# Patient Record
Sex: FEMALE | Race: BLACK OR AFRICAN AMERICAN | NOT HISPANIC OR LATINO | Employment: UNEMPLOYED | URBAN - METROPOLITAN AREA
[De-identification: names, ages, dates, MRNs, and addresses within clinical notes are randomized per-mention and may not be internally consistent; named-entity substitution may affect disease eponyms.]

---

## 2020-12-08 ENCOUNTER — ULTRASOUND (OUTPATIENT)
Dept: OBGYN CLINIC | Facility: CLINIC | Age: 25
End: 2020-12-08

## 2020-12-08 VITALS
DIASTOLIC BLOOD PRESSURE: 71 MMHG | HEART RATE: 84 BPM | BODY MASS INDEX: 18.92 KG/M2 | WEIGHT: 110.8 LBS | SYSTOLIC BLOOD PRESSURE: 107 MMHG | HEIGHT: 64 IN

## 2020-12-08 DIAGNOSIS — Z3A.15 15 WEEKS GESTATION OF PREGNANCY: ICD-10-CM

## 2020-12-08 DIAGNOSIS — N91.2 AMENORRHEA: Primary | ICD-10-CM

## 2020-12-08 LAB — SL AMB POCT URINE HCG: POSITIVE

## 2020-12-08 PROCEDURE — 81025 URINE PREGNANCY TEST: CPT | Performed by: OBSTETRICS & GYNECOLOGY

## 2020-12-08 PROCEDURE — 99203 OFFICE O/P NEW LOW 30 MIN: CPT | Performed by: OBSTETRICS & GYNECOLOGY

## 2020-12-14 ENCOUNTER — INITIAL PRENATAL (OUTPATIENT)
Dept: OBGYN CLINIC | Facility: CLINIC | Age: 25
End: 2020-12-14

## 2020-12-14 VITALS
DIASTOLIC BLOOD PRESSURE: 75 MMHG | HEIGHT: 64 IN | WEIGHT: 112.4 LBS | HEART RATE: 88 BPM | SYSTOLIC BLOOD PRESSURE: 109 MMHG | BODY MASS INDEX: 19.19 KG/M2

## 2020-12-14 DIAGNOSIS — Z3A.16 16 WEEKS GESTATION OF PREGNANCY: Primary | ICD-10-CM

## 2020-12-14 PROCEDURE — T1001 NURSING ASSESSMENT/EVALUATN: HCPCS

## 2020-12-15 ENCOUNTER — LAB (OUTPATIENT)
Dept: LAB | Facility: HOSPITAL | Age: 25
End: 2020-12-15
Payer: COMMERCIAL

## 2020-12-15 DIAGNOSIS — Z3A.16 16 WEEKS GESTATION OF PREGNANCY: ICD-10-CM

## 2020-12-15 DIAGNOSIS — Z3A.15 15 WEEKS GESTATION OF PREGNANCY: ICD-10-CM

## 2020-12-15 LAB
ABO GROUP BLD: NORMAL
BACTERIA UR QL AUTO: ABNORMAL /HPF
BASOPHILS # BLD AUTO: 0.02 THOUSANDS/ΜL (ref 0–0.1)
BASOPHILS NFR BLD AUTO: 0 % (ref 0–1)
BILIRUB UR QL STRIP: NEGATIVE
BLD GP AB SCN SERPL QL: NEGATIVE
CLARITY UR: CLEAR
COLOR UR: ABNORMAL
EOSINOPHIL # BLD AUTO: 0.18 THOUSAND/ΜL (ref 0–0.61)
EOSINOPHIL NFR BLD AUTO: 3 % (ref 0–6)
ERYTHROCYTE [DISTWIDTH] IN BLOOD BY AUTOMATED COUNT: 14 % (ref 11.6–15.1)
GLUCOSE UR STRIP-MCNC: NEGATIVE MG/DL
HBV SURFACE AG SER QL: NORMAL
HCT VFR BLD AUTO: 33.2 % (ref 34.8–46.1)
HGB BLD-MCNC: 11.1 G/DL (ref 11.5–15.4)
HGB UR QL STRIP.AUTO: NEGATIVE
HYALINE CASTS #/AREA URNS LPF: ABNORMAL /LPF
IMM GRANULOCYTES # BLD AUTO: 0.02 THOUSAND/UL (ref 0–0.2)
IMM GRANULOCYTES NFR BLD AUTO: 0 % (ref 0–2)
KETONES UR STRIP-MCNC: ABNORMAL MG/DL
LEUKOCYTE ESTERASE UR QL STRIP: ABNORMAL
LYMPHOCYTES # BLD AUTO: 1.54 THOUSANDS/ΜL (ref 0.6–4.47)
LYMPHOCYTES NFR BLD AUTO: 24 % (ref 14–44)
MCH RBC QN AUTO: 26.1 PG (ref 26.8–34.3)
MCHC RBC AUTO-ENTMCNC: 33.4 G/DL (ref 31.4–37.4)
MCV RBC AUTO: 78 FL (ref 82–98)
MONOCYTES # BLD AUTO: 0.47 THOUSAND/ΜL (ref 0.17–1.22)
MONOCYTES NFR BLD AUTO: 7 % (ref 4–12)
NEUTROPHILS # BLD AUTO: 4.08 THOUSANDS/ΜL (ref 1.85–7.62)
NEUTS SEG NFR BLD AUTO: 66 % (ref 43–75)
NITRITE UR QL STRIP: NEGATIVE
NON-SQ EPI CELLS URNS QL MICRO: ABNORMAL /HPF
NRBC BLD AUTO-RTO: 0 /100 WBCS
PH UR STRIP.AUTO: 5.5 [PH]
PLATELET # BLD AUTO: 197 THOUSANDS/UL (ref 149–390)
PMV BLD AUTO: 11.2 FL (ref 8.9–12.7)
PROT UR STRIP-MCNC: NEGATIVE MG/DL
RBC # BLD AUTO: 4.26 MILLION/UL (ref 3.81–5.12)
RBC #/AREA URNS AUTO: ABNORMAL /HPF
RH BLD: POSITIVE
RUBV IGG SERPL IA-ACNC: >175 IU/ML
SP GR UR STRIP.AUTO: 1.02 (ref 1–1.03)
SPECIMEN EXPIRATION DATE: NORMAL
UROBILINOGEN UR QL STRIP.AUTO: 1 E.U./DL
WBC # BLD AUTO: 6.31 THOUSAND/UL (ref 4.31–10.16)
WBC #/AREA URNS AUTO: ABNORMAL /HPF

## 2020-12-15 PROCEDURE — 87086 URINE CULTURE/COLONY COUNT: CPT

## 2020-12-15 PROCEDURE — 81001 URINALYSIS AUTO W/SCOPE: CPT

## 2020-12-15 PROCEDURE — 82105 ALPHA-FETOPROTEIN SERUM: CPT

## 2020-12-15 PROCEDURE — 83020 HEMOGLOBIN ELECTROPHORESIS: CPT

## 2020-12-15 PROCEDURE — 80081 OBSTETRIC PANEL INC HIV TSTG: CPT

## 2020-12-15 PROCEDURE — 36415 COLL VENOUS BLD VENIPUNCTURE: CPT

## 2020-12-15 PROCEDURE — 86787 VARICELLA-ZOSTER ANTIBODY: CPT

## 2020-12-16 LAB
BACTERIA UR CULT: NORMAL
HIV 1+2 AB+HIV1 P24 AG SERPL QL IA: NORMAL
RPR SER QL: NORMAL

## 2020-12-17 LAB
2ND TRIMESTER 4 SCREEN SERPL-IMP: NORMAL
AFP ADJ MOM SERPL: 0.71
AFP INTERP AMN-IMP: NORMAL
AFP INTERP SERPL-IMP: NORMAL
AFP INTERP SERPL-IMP: NORMAL
AFP SERPL-MCNC: 32.8 NG/ML
AGE AT DELIVERY: 27.5 YR
GA METHOD: NORMAL
GA: 17 WEEKS
IDDM PATIENT QL: NO
MULTIPLE PREGNANCY: NO
NEURAL TUBE DEFECT RISK FETUS: NORMAL %

## 2020-12-18 LAB
DEPRECATED HGB OTHER BLD-IMP: 0 %
HGB A MFR BLD: 4.3 % (ref 1.8–3.2)
HGB A MFR BLD: 62.3 % (ref 96.4–98.8)
HGB C MFR BLD: 0 %
HGB F MFR BLD: 0 % (ref 0–2)
HGB FRACT BLD-IMP: ABNORMAL
HGB S BLD QL SOLY: POSITIVE
HGB S MFR BLD: 33.4 %
VZV IGG SER IA-ACNC: NORMAL

## 2020-12-21 ENCOUNTER — TELEPHONE (OUTPATIENT)
Dept: OBGYN CLINIC | Facility: CLINIC | Age: 25
End: 2020-12-21

## 2020-12-22 ENCOUNTER — ROUTINE PRENATAL (OUTPATIENT)
Dept: OBGYN CLINIC | Facility: CLINIC | Age: 25
End: 2020-12-22

## 2020-12-22 VITALS
HEIGHT: 64 IN | DIASTOLIC BLOOD PRESSURE: 73 MMHG | BODY MASS INDEX: 19.46 KG/M2 | WEIGHT: 114 LBS | HEART RATE: 94 BPM | SYSTOLIC BLOOD PRESSURE: 106 MMHG

## 2020-12-22 DIAGNOSIS — Z3A.16 16 WEEKS GESTATION OF PREGNANCY: Primary | ICD-10-CM

## 2020-12-22 DIAGNOSIS — Z12.4 PAP SMEAR FOR CERVICAL CANCER SCREENING: ICD-10-CM

## 2020-12-22 DIAGNOSIS — Z11.3 SCREEN FOR STD (SEXUALLY TRANSMITTED DISEASE): ICD-10-CM

## 2020-12-22 PROBLEM — D57.3 SICKLE CELL TRAIT (HCC): Status: ACTIVE | Noted: 2020-12-22

## 2020-12-22 LAB
SL AMB  POCT GLUCOSE, UA: NEGATIVE
SL AMB POCT URINE PROTEIN: NEGATIVE

## 2020-12-22 PROCEDURE — 99213 OFFICE O/P EST LOW 20 MIN: CPT | Performed by: OBSTETRICS & GYNECOLOGY

## 2020-12-22 PROCEDURE — 81002 URINALYSIS NONAUTO W/O SCOPE: CPT | Performed by: OBSTETRICS & GYNECOLOGY

## 2020-12-22 PROCEDURE — G0145 SCR C/V CYTO,THINLAYER,RESCR: HCPCS | Performed by: OBSTETRICS & GYNECOLOGY

## 2020-12-22 PROCEDURE — 87591 N.GONORRHOEAE DNA AMP PROB: CPT | Performed by: OBSTETRICS & GYNECOLOGY

## 2020-12-22 PROCEDURE — 87491 CHLMYD TRACH DNA AMP PROBE: CPT | Performed by: OBSTETRICS & GYNECOLOGY

## 2020-12-23 LAB
C TRACH DNA SPEC QL NAA+PROBE: NEGATIVE
N GONORRHOEA DNA SPEC QL NAA+PROBE: NEGATIVE

## 2020-12-28 LAB
LAB AP GYN PRIMARY INTERPRETATION: NORMAL
Lab: NORMAL

## 2021-01-19 ENCOUNTER — ROUTINE PRENATAL (OUTPATIENT)
Dept: OBGYN CLINIC | Facility: CLINIC | Age: 26
End: 2021-01-19

## 2021-01-19 VITALS
HEIGHT: 64 IN | DIASTOLIC BLOOD PRESSURE: 57 MMHG | SYSTOLIC BLOOD PRESSURE: 91 MMHG | BODY MASS INDEX: 19.63 KG/M2 | WEIGHT: 115 LBS | HEART RATE: 82 BPM

## 2021-01-19 DIAGNOSIS — Z3A.20 20 WEEKS GESTATION OF PREGNANCY: Primary | ICD-10-CM

## 2021-01-19 LAB
SL AMB  POCT GLUCOSE, UA: NORMAL
SL AMB LEUKOCYTE ESTERASE,UA: NORMAL
SL AMB POCT BILIRUBIN,UA: NORMAL
SL AMB POCT BLOOD,UA: NORMAL
SL AMB POCT KETONES,UA: NORMAL
SL AMB POCT NITRITE,UA: NORMAL
SL AMB POCT PH,UA: 6
SL AMB POCT SPECIFIC GRAVITY,UA: 1.01
SL AMB POCT URINE PROTEIN: NORMAL
SL AMB POCT UROBILINOGEN: NORMAL

## 2021-01-19 PROCEDURE — 81002 URINALYSIS NONAUTO W/O SCOPE: CPT | Performed by: OBSTETRICS & GYNECOLOGY

## 2021-01-19 PROCEDURE — 99213 OFFICE O/P EST LOW 20 MIN: CPT | Performed by: OBSTETRICS & GYNECOLOGY

## 2021-01-19 NOTE — ASSESSMENT & PLAN NOTE
Blood type A+, Sickle cell trait   Needs both flu and Tdap; declined  Partner did not complete Hgb electrophoresis, did explain the utility of it, however he reports that he does not have any medical insurance  YESSICA zimmerman  Upcoming  01/26/21  Order 28 week labs at next visit  Contraception: declines contraception   Labor precautions reviewed  Fetal kick counts reviewed  Continue Prenatal Vitamins  RTC 4 weeks

## 2021-01-19 NOTE — PROGRESS NOTES
OB/GYN prenatal visit    S: 30 y o  Linwood Leon 20w1d here for PN visit  She has no obstetric complaints, including pelvic pain, contractions, vaginal bleeding, loss of fluid, or decreased fetal movement, reports she recently started feeling movement       OB Cx: none    O:  Vitals:    01/19/21 1120   BP: 91/57   Pulse: 82       Gen: no acute distress, nonlabored breathing          A/P:  Problem List Items Addressed This Visit        Other    20 weeks gestation of pregnancy - Primary     Blood type A+, Sickle cell trait   Needs both flu and Tdap; declined  Partner did not complete Hgb electrophoresis, did explain the utility of it, however he reports that he does not have any medical insurance  MSAFP wnl  Upcoming US 01/26/21  Order 28 week labs at next visit  Contraception: declines contraception   Labor precautions reviewed  Fetal kick counts reviewed  Continue Prenatal Vitamins  RTC 4 weeks         Relevant Orders    POCT urine dip (Completed)                       Sandee De Santiago MD  1/19/2021  11:30 AM

## 2021-01-25 ENCOUNTER — TELEPHONE (OUTPATIENT)
Dept: PERINATAL CARE | Facility: CLINIC | Age: 26
End: 2021-01-25

## 2021-01-25 NOTE — TELEPHONE ENCOUNTER
Spoke with patient and confirmed appointment with [facility]  1 support person ( must be over age of 15) may accompany patient  Will you and your support person be able to wear a mask ,without a valve , during entire appointment? YES   To minimize your exposure in our waiting area,check in and rooming questions will be done via phone  When you arrive in the parking lot please call the following inside line # prior to entering office:    Alf: [phone]    Have you or your support person traveled outside the state in the last 2 weeks? NO  If yes, what state did you travel to? Do you or your support person have:  Fever or flu- like symptoms? NO  Symptoms of upper respiratory infection like runny nose, sore throat or cough? NO  Do you have new headache that you have not had in the past?NO  Have you experienced any new shortness of breath recently? NO  Do you have any new loss of taste or smell? NO  Do you have any new diarrhea, nausea or vomiting? NO  Have you recently been in contact with anyone who has been sick or diagnosed with COVID-19 infection? NO  Have you been recommended to quarantine because of an exposure to a confirmed positive COVID19 person? NO  Have you recently been tested for COVID19? NO    Patient verbalized understanding of all instructions   -------------------------------------------------------------

## 2021-01-26 ENCOUNTER — ROUTINE PRENATAL (OUTPATIENT)
Dept: PERINATAL CARE | Facility: CLINIC | Age: 26
End: 2021-01-26
Payer: COMMERCIAL

## 2021-01-26 VITALS
HEIGHT: 64 IN | HEART RATE: 84 BPM | DIASTOLIC BLOOD PRESSURE: 65 MMHG | BODY MASS INDEX: 20.35 KG/M2 | WEIGHT: 119.2 LBS | SYSTOLIC BLOOD PRESSURE: 99 MMHG

## 2021-01-26 DIAGNOSIS — N91.2 AMENORRHEA: ICD-10-CM

## 2021-01-26 DIAGNOSIS — Z3A.21 21 WEEKS GESTATION OF PREGNANCY: Primary | ICD-10-CM

## 2021-01-26 DIAGNOSIS — Z13.79 GENETIC SCREENING: ICD-10-CM

## 2021-01-26 PROCEDURE — 76817 TRANSVAGINAL US OBSTETRIC: CPT | Performed by: OBSTETRICS & GYNECOLOGY

## 2021-01-26 PROCEDURE — 76811 OB US DETAILED SNGL FETUS: CPT | Performed by: OBSTETRICS & GYNECOLOGY

## 2021-01-26 PROCEDURE — 99202 OFFICE O/P NEW SF 15 MIN: CPT | Performed by: OBSTETRICS & GYNECOLOGY

## 2021-01-26 NOTE — LETTER
2021     Echo Neff MD  [address] [place] [address]    Patient: Martina Dumont   YOB: 1995   Date of Visit: 2021       Dear Dr Sharyle Schlatter: Thank you for referring Martina Dumont to me for evaluation  Below are my notes for this consultation  If you have questions, please do not hesitate to call me  I look forward to following your patient along with you  Sincerely,        Pau Romero MD        CC: No Recipients  Pau Romero MD  2021 12:43 PM  Sign when Signing Visit  Ob ultrasound and [facility] consultation    Ms Geeta Lawson is a 29 yo   patient  1st visit in [facility]  Communication was very difficult even with support from a [facility]  over the telephone and assistance from her partner [employer] ultrasound at 21 1/7 weeks gestation who presents for a fetal anatomical examination and TV cervical length  She is asymptomatic  See Ob procedures in EPIC  Ultrasound:      1  Fetal size = dates  2   No fetal anomalies observed  3  Normal; TV cervical length=5 0  cm; No funneling, no debris at the internal os  I reviewed the results of this ultrasound and answered  all the questions  Ob Hx: 2018:  vaginal delivery at term  Live baby boy  Does not recall weight  Medical Hx: Negative      Surgical Hx: Appendectomy in 2018      Social Hx: no use of tobacco alcohol or illicit drugs      Medications: None      Family Hx: non contributory      Allergies: None      Recommendations:      1  At this gestational age a test to screen for Open neural tube defects (ONTD's) by the measurement of the MSAFP, can be offered  knowing the ultrasound done today  is within normal range and has a high sensitivity to detect ONTD's in the 95%  The Quad screen can be used for fetal aneuploidy with an 81% detection rate for Trisomy 21, to be done before 22 weeks to be ordered and followed from your office        2  Follow up scan at 34 weeks to monitor fetal growtth and development      The total time spent on this established patient on the encounter date was 20 minutes  This time included previsit service time of 5 minutes, face-to-face  service time of 10 minutes discussing the results of the ultrasound, medical history, findings and recomendations, and post-service time of 5 minutes  Thank you for referring your patient to our offices  The limitations of ultrasound to detect all anomalies was reviewed and how it is not  a test to rule out aneuploidy  If you have any further questions do not hesitate to contact us as [phone]       Kandace Solares MD

## 2021-01-26 NOTE — PROGRESS NOTES
Ultrasound Probe Disinfection    A transvaginal ultrasound was performed  Prior to use, disinfection was performed with High Level Disinfection Process (Trophon)  Probe serial number [id number]: [id number] was used        [facility]  01/26/21  11:06 AM

## 2021-01-29 PROBLEM — Z3A.21 21 WEEKS GESTATION OF PREGNANCY: Status: ACTIVE | Noted: 2021-01-19

## 2021-01-29 PROBLEM — Z13.79 GENETIC SCREENING: Status: ACTIVE | Noted: 2021-01-29

## 2021-01-29 PROBLEM — N91.2 AMENORRHEA: Status: ACTIVE | Noted: 2021-01-29

## 2021-01-29 NOTE — PROGRESS NOTES
Ob ultrasound and [facility] consultation    Ms Wiliam Garcia is a 31 yo   patient  1st visit in [facility]  Communication was very difficult even with support from a Richard Kinney  over the telephone and assistance from her partner Dony Arash ultrasound at 21 1/7 weeks gestation who presents for a fetal anatomical examination and TV cervical length  She is asymptomatic  See Ob procedures in EPIC  Ultrasound:      1  Fetal size = dates  2   No fetal anomalies observed  3  Normal; TV cervical length=5 0  cm; No funneling, no debris at the internal os  I reviewed the results of this ultrasound and answered  all the questions  Ob Hx: 2018:  vaginal delivery at term  Live baby boy  Does not recall weight  Medical Hx: Negative      Surgical Hx: Appendectomy in 2018      Social Hx: no use of tobacco alcohol or illicit drugs      Medications: None      Family Hx: non contributory      Allergies: None      Recommendations:      1  At this gestational age a test to screen for Open neural tube defects (ONTD's) by the measurement of the MSAFP, can be offered  knowing the ultrasound done today  is within normal range and has a high sensitivity to detect ONTD's in the 95%  The Quad screen can be used for fetal aneuploidy with an 81% detection rate for Trisomy 21, to be done before 22 weeks to be ordered and followed from your office  2  Follow up scan at 34 weeks to monitor fetal growtth and development      The total time spent on this established patient on the encounter date was 20 minutes  This time included previsit service time of 5 minutes, face-to-face  service time of 10 minutes discussing the results of the ultrasound, medical history, findings and recomendations, and post-service time of 5 minutes  Thank you for referring your patient to our offices  The limitations of ultrasound to detect all anomalies was reviewed and how it is not  a test to rule out aneuploidy   If you have any further questions do not hesitate to contact us as [phone]       Sorin Marin MD

## 2021-02-15 ENCOUNTER — TELEPHONE (OUTPATIENT)
Dept: OBGYN CLINIC | Facility: CLINIC | Age: 26
End: 2021-02-15

## 2021-02-16 ENCOUNTER — ROUTINE PRENATAL (OUTPATIENT)
Dept: OBGYN CLINIC | Facility: CLINIC | Age: 26
End: 2021-02-16

## 2021-02-16 VITALS
WEIGHT: 124 LBS | BODY MASS INDEX: 21.17 KG/M2 | DIASTOLIC BLOOD PRESSURE: 64 MMHG | HEART RATE: 84 BPM | HEIGHT: 64 IN | SYSTOLIC BLOOD PRESSURE: 109 MMHG

## 2021-02-16 DIAGNOSIS — Z3A.24 24 WEEKS GESTATION OF PREGNANCY: Primary | ICD-10-CM

## 2021-02-16 PROBLEM — K59.00 CONSTIPATION DURING PREGNANCY: Status: ACTIVE | Noted: 2021-02-16

## 2021-02-16 PROBLEM — Z28.21 INFLUENZA VACCINATION DECLINED BY PATIENT: Status: ACTIVE | Noted: 2021-02-16

## 2021-02-16 PROBLEM — N91.2 AMENORRHEA: Status: RESOLVED | Noted: 2021-01-29 | Resolved: 2021-02-16

## 2021-02-16 PROBLEM — Z3A.16 16 WEEKS GESTATION OF PREGNANCY: Status: RESOLVED | Noted: 2020-12-22 | Resolved: 2021-02-16

## 2021-02-16 PROBLEM — O99.619 CONSTIPATION DURING PREGNANCY: Status: ACTIVE | Noted: 2021-02-16

## 2021-02-16 LAB
SL AMB  POCT GLUCOSE, UA: NORMAL
SL AMB LEUKOCYTE ESTERASE,UA: NORMAL
SL AMB POCT KETONES,UA: NORMAL
SL AMB POCT URINE PROTEIN: NORMAL

## 2021-02-16 PROCEDURE — 81002 URINALYSIS NONAUTO W/O SCOPE: CPT | Performed by: OBSTETRICS & GYNECOLOGY

## 2021-02-16 PROCEDURE — 99213 OFFICE O/P EST LOW 20 MIN: CPT | Performed by: OBSTETRICS & GYNECOLOGY

## 2021-02-16 NOTE — PROGRESS NOTES
OB/GYN  PN Visit  Stephani Singletary  [id number]  2/16/2021  10:39 AM  Dr Kip Thomson MD    S: 30 y o  [id number] 24w1d here for PN visit  HPI: 30 y o  Jairon Click at 24w1d here for PN visit  ÁNGEL: Estimated Date of Delivery: 6/7/21      OB Cx: none and mom has sickle cell trait      OB complaints: none  Ctxns: denies having uterine contractions  Leakage:   no LOF  Bleeding: heavy VB  Fetal movement:  states she has felt good FM  She reports Consuptation     She has bowel movements sometimes every day every other day or as much as 4 days sometimes in between bowel movements  Chemistry a decent amount of water but does not have a high-fiber diet  No blood in stool  O:  Vitals:    02/16/21 1000   BP: 109/64   Pulse: 84       Gen:  No acute distress, nonlabored breathing,   HENT: EOM nl  Cardiovascular: Regular, Rate and Rhythm,   Abdominal: Soft  NT/ND  Gravid  Neuro: AAOX3  Psychiatric: Normal mood and affect  Speech and behavior normal   OB exam completed: fundal height, FHTs (143)    A/P:  #1  24w1d GESTATION  -CBC, RPR and 1 hr glucose and antibody screen ordered  Recommended to complete a few days before 28 week visit  -Given increased risk for asymptomatic bacteria due to sickle cell will order urine culture today  Patient complete it at the lab later today  - Still declines flu and Tdap and father hb electrophoresis   -Encouraged hydration and increasing fiber in diet as she did not see high-fiber diet for her constipation   -Labor precautions reviewed  -RTC in  4  weeks      #2  Continue Prenatal Vitamins    Future Appointments   Date Time Provider Ruthie Gray   4/27/2021 11:00 AM Uzma Robert MD  2/16/2021  10:39 AM

## 2021-03-13 ENCOUNTER — APPOINTMENT (OUTPATIENT)
Dept: LAB | Facility: HOSPITAL | Age: 26
End: 2021-03-13
Payer: COMMERCIAL

## 2021-03-13 DIAGNOSIS — Z3A.24 24 WEEKS GESTATION OF PREGNANCY: ICD-10-CM

## 2021-03-13 LAB
BASOPHILS # BLD AUTO: 0.03 THOUSANDS/ΜL (ref 0–0.1)
BASOPHILS NFR BLD AUTO: 0 % (ref 0–1)
BLD GP AB SCN SERPL QL: NEGATIVE
EOSINOPHIL # BLD AUTO: 0.17 THOUSAND/ΜL (ref 0–0.61)
EOSINOPHIL NFR BLD AUTO: 2 % (ref 0–6)
ERYTHROCYTE [DISTWIDTH] IN BLOOD BY AUTOMATED COUNT: 14.2 % (ref 11.6–15.1)
GLUCOSE 1H P 50 G GLC PO SERPL-MCNC: 110 MG/DL
HCT VFR BLD AUTO: 30.7 % (ref 34.8–46.1)
HGB BLD-MCNC: 10 G/DL (ref 11.5–15.4)
IMM GRANULOCYTES # BLD AUTO: 0.04 THOUSAND/UL (ref 0–0.2)
IMM GRANULOCYTES NFR BLD AUTO: 1 % (ref 0–2)
LYMPHOCYTES # BLD AUTO: 1.64 THOUSANDS/ΜL (ref 0.6–4.47)
LYMPHOCYTES NFR BLD AUTO: 23 % (ref 14–44)
MCH RBC QN AUTO: 25.5 PG (ref 26.8–34.3)
MCHC RBC AUTO-ENTMCNC: 32.6 G/DL (ref 31.4–37.4)
MCV RBC AUTO: 78 FL (ref 82–98)
MONOCYTES # BLD AUTO: 0.56 THOUSAND/ΜL (ref 0.17–1.22)
MONOCYTES NFR BLD AUTO: 8 % (ref 4–12)
NEUTROPHILS # BLD AUTO: 4.69 THOUSANDS/ΜL (ref 1.85–7.62)
NEUTS SEG NFR BLD AUTO: 66 % (ref 43–75)
NRBC BLD AUTO-RTO: 0 /100 WBCS
PLATELET # BLD AUTO: 216 THOUSANDS/UL (ref 149–390)
PMV BLD AUTO: 11.6 FL (ref 8.9–12.7)
RBC # BLD AUTO: 3.92 MILLION/UL (ref 3.81–5.12)
WBC # BLD AUTO: 7.13 THOUSAND/UL (ref 4.31–10.16)

## 2021-03-13 PROCEDURE — 85025 COMPLETE CBC W/AUTO DIFF WBC: CPT

## 2021-03-13 PROCEDURE — 36415 COLL VENOUS BLD VENIPUNCTURE: CPT

## 2021-03-13 PROCEDURE — 86592 SYPHILIS TEST NON-TREP QUAL: CPT

## 2021-03-13 PROCEDURE — 82950 GLUCOSE TEST: CPT

## 2021-03-13 PROCEDURE — 87086 URINE CULTURE/COLONY COUNT: CPT

## 2021-03-13 PROCEDURE — 86850 RBC ANTIBODY SCREEN: CPT

## 2021-03-14 LAB — BACTERIA UR CULT: NORMAL

## 2021-03-15 LAB — RPR SER QL: NORMAL

## 2021-03-16 ENCOUNTER — ROUTINE PRENATAL (OUTPATIENT)
Dept: OBGYN CLINIC | Facility: CLINIC | Age: 26
End: 2021-03-16

## 2021-03-16 VITALS
WEIGHT: 127 LBS | BODY MASS INDEX: 21.68 KG/M2 | HEIGHT: 64 IN | SYSTOLIC BLOOD PRESSURE: 113 MMHG | DIASTOLIC BLOOD PRESSURE: 76 MMHG | HEART RATE: 82 BPM

## 2021-03-16 DIAGNOSIS — D64.9 ANEMIA, UNSPECIFIED TYPE: Primary | ICD-10-CM

## 2021-03-16 PROBLEM — Z3A.28 28 WEEKS GESTATION OF PREGNANCY: Status: ACTIVE | Noted: 2021-01-19

## 2021-03-16 PROCEDURE — 99213 OFFICE O/P EST LOW 20 MIN: CPT | Performed by: OBSTETRICS & GYNECOLOGY

## 2021-03-16 RX ORDER — FERROUS SULFATE TAB EC 324 MG (65 MG FE EQUIVALENT) 324 (65 FE) MG
324 TABLET DELAYED RESPONSE ORAL
Qty: 30 TABLET | Refills: 2 | Status: SHIPPED | OUTPATIENT
Start: 2021-03-16 | End: 2021-06-14

## 2021-03-16 RX ORDER — DOCUSATE SODIUM 100 MG/1
100 CAPSULE, LIQUID FILLED ORAL 2 TIMES DAILY
Qty: 30 CAPSULE | Refills: 2 | Status: SHIPPED | OUTPATIENT
Start: 2021-03-16 | End: 2021-06-14

## 2021-03-16 NOTE — PROGRESS NOTES
OB/GYN prenatal visit    S: 30 y o  Martha Lawrence 28w1d here for PN visit  She denies obstetric complaints, including pelvic pain, contractions, vaginal bleeding, loss of fluid, or decreased fetal movement       O:  Vitals:    21 1000   BP: 113/76   Pulse: 82       Gen: no acute distress, nonlabored breathing  Fundal Height (cm): 27 cm  Fetal Heart Rate: 158    A/P:      IUP at 28w1d  No obstetric complaints today  Reviewed 28 week lab and urine culture; negative  [address]  [facility] 21 wnl; next ultrasound at 34 week for growth  TWG: + 14 pounds (recommended weight gain 25-35 pounds)  Flu vaccine declined, TDAP vaccine declined, given information today  Contraception: declines  Breastfeeding: yes  Birth plan: unsure about epidural  Discussed  labor precautions and fetal kick counts    Please sign delivery consent at next visit  Return to clinic in 2 weeks    Sickle cell trait  FOB unable to get electrophoreses due to insurance    Anemia   Hgb 10 0  PO iron supplementation and colace BID  Encourage iron rich food    COVID 19 precautions were discussed with patient at length, reviewed symptoms, hygiene, social distancing, patient to call office  with questions/concerns    Katie Laird MD  3/16/2021  11:02 AM

## 2021-03-16 NOTE — PROGRESS NOTES
28 week education packet provided to patient on 03/16/21  Included in packet:   Third Trimester paperwork  Delivery consent   Birthing room support person rules and acknowledgment  Birth Plan   Welcome information  Birth certificate worksheet   Consent for Photographers  Perineal/ Vaginal massage   Pediatric practices and locations

## 2021-03-30 ENCOUNTER — ROUTINE PRENATAL (OUTPATIENT)
Dept: OBGYN CLINIC | Facility: CLINIC | Age: 26
End: 2021-03-30

## 2021-03-30 VITALS
BODY MASS INDEX: 22.02 KG/M2 | WEIGHT: 129 LBS | SYSTOLIC BLOOD PRESSURE: 108 MMHG | HEIGHT: 64 IN | DIASTOLIC BLOOD PRESSURE: 76 MMHG | HEART RATE: 85 BPM

## 2021-03-30 DIAGNOSIS — K59.09 OTHER CONSTIPATION: Primary | ICD-10-CM

## 2021-03-30 PROCEDURE — 99213 OFFICE O/P EST LOW 20 MIN: CPT | Performed by: OBSTETRICS & GYNECOLOGY

## 2021-03-30 RX ORDER — POLYETHYLENE GLYCOL 3350 17 G/17G
17 POWDER, FOR SOLUTION ORAL DAILY
Qty: 28 EACH | Refills: 2 | Status: SHIPPED | OUTPATIENT
Start: 2021-03-30 | End: 2021-06-09 | Stop reason: HOSPADM

## 2021-03-31 NOTE — PROGRESS NOTES
OB/GYN prenatal visit    S: 30 y o  Mindy Hernández 30w1d here for PN visit  Patient reports contraction like pelvic pressure about 3 days ago that has now resolved  She currently denies obstetric complaints, including pelvic pain, contractions, vaginal bleeding, loss of fluid, or decreased fetal movement  O:  Vitals:    21 1100   BP: 108/76   Pulse: 85       Gen: no acute distress, nonlabored breathing  Fundal Height (cm): 30 cm  Fetal Heart Rate: 145    A/P:      IUP at 30w1d  No obstetric complaints today  [address]  [facility] 21 wnl; next ultrasound at 34 week for growth  TWG: + 16 pounds (recommended weight gain 25-35 pounds)  Flu vaccine declined, TDAP vaccine declined  Contraception: declines  Breastfeeding: yes  Birth plan: unsure about epidural  Discussed  labor precautions and fetal kick counts    Delivery consent signed today;  Patient declines any operative vaginal delivery  Return to clinic in 2 weeks    Sickle cell trait  FOB unable to get electrophoreses due to insurance  Repeat urine culture in third trimester    Anemia   Hgb 10 0  PO iron supplementation and colace BID  Encourage iron rich food    COVID 19 precautions were discussed with patient at length, reviewed symptoms, hygiene, social distancing, patient to call office  with questions/concerns    Navjot Noble MD  3/30/2021  12:30 PM

## 2021-04-09 PROBLEM — Z3A.32 32 WEEKS GESTATION OF PREGNANCY: Status: ACTIVE | Noted: 2021-01-19

## 2021-04-09 PROBLEM — Z3A.31 31 WEEKS GESTATION OF PREGNANCY: Status: ACTIVE | Noted: 2021-01-19

## 2021-04-09 NOTE — PROGRESS NOTES
OB/GYN prenatal visit    S: 30 y o  Kusum Hadley 32w0d here for PN visit  She has no obstetric complaints, including pelvic pain, contractions, vaginal bleeding, loss of fluid, or decreased fetal movement  She is having issues with hemorrhoids but isn't taking anything for these      O:  Vitals:    21 1000   BP: 106/68   Pulse: 95       Gen: no acute distress, nonlabored breathing  Fundal Height (cm): 31 cm  Fetal Heart Rate: 145    A/P:      IUP at 32w0d  No obstetric complaints today  [facility] US 21 wnl; next ultrasound 21  TWG: + 14 (recommended weight gain 25-35lbs)  Flu vaccine declined, TDAP vaccine explained, will consider  Contraception: declines, discussed natural family planning, condoms and withdrawal  Breastfeeding: yes  Birth plan: would like epidural  Discussed  labor precautions and fetal kick counts      Please note patient declines any operative vaginal delivery    Sickle cell trait  FOB unable to get testing due to insurance    Anemia  Hgb 10 0 on 3/13/21, MCV 78  Continue PO iron supplementation and colace BID, as well as iron rich diet  Encouraged her to continue to use laxatives to prevent constipation    Hemorrhoids  Hydrocortisone cream prescribed      Return to clinic in 2 weeks      COVID 19 precautions were discussed with patient at length, reviewed symptoms, hygiene, social distancing, patient to call office  with questions/concerns    Interview conducted in [facility]    Discussed with Dr Alisa Hammans, MD  2021  10:38 AM

## 2021-04-12 ENCOUNTER — ROUTINE PRENATAL (OUTPATIENT)
Dept: OBGYN CLINIC | Facility: CLINIC | Age: 26
End: 2021-04-12

## 2021-04-12 VITALS
DIASTOLIC BLOOD PRESSURE: 68 MMHG | HEIGHT: 64 IN | SYSTOLIC BLOOD PRESSURE: 106 MMHG | HEART RATE: 95 BPM | BODY MASS INDEX: 21.51 KG/M2 | WEIGHT: 126 LBS

## 2021-04-12 DIAGNOSIS — Z3A.32 32 WEEKS GESTATION OF PREGNANCY: Primary | ICD-10-CM

## 2021-04-12 DIAGNOSIS — K64.9 HEMORRHOIDS, UNSPECIFIED HEMORRHOID TYPE: ICD-10-CM

## 2021-04-12 DIAGNOSIS — D57.3 SICKLE CELL TRAIT (HCC): ICD-10-CM

## 2021-04-12 PROCEDURE — 99213 OFFICE O/P EST LOW 20 MIN: CPT | Performed by: OBSTETRICS & GYNECOLOGY

## 2021-04-12 RX ORDER — HYDROCORTISONE 25 MG/G
CREAM TOPICAL 2 TIMES DAILY
Qty: 1 TUBE | Refills: 0 | Status: SHIPPED | OUTPATIENT
Start: 2021-04-12 | End: 2021-04-16

## 2021-04-12 RX ORDER — POLYETHYLENE GLYCOL 3350 17 G/17G
17 POWDER, FOR SOLUTION ORAL DAILY
COMMUNITY
Start: 2021-03-30 | End: 2021-06-29

## 2021-04-13 ENCOUNTER — TELEPHONE (OUTPATIENT)
Dept: OBGYN CLINIC | Facility: CLINIC | Age: 26
End: 2021-04-13

## 2021-04-13 NOTE — TELEPHONE ENCOUNTER
Pt's  called for pt as [facility]  re: medication ordered by provider yesterday for hemorrhoids  Per pt's  hemorrhoids not improving after administering medication twice since yesterday's appt "She is still in a lot of pain"  RN consulted with attending provider and advised  per attending to increase fiber and water for softer stool, may apply the hydrocortisone cream up to 4 times a day, and may use OTC TUCKS as directed if needed  Per attending if not improved may call back to schedule appt  RN advised pt's  to call back if symptoms worsen and/or questions/concerns  Pt's  had a verbal understanding and was comfortable with the plan

## 2021-04-15 ENCOUNTER — TELEPHONE (OUTPATIENT)
Dept: OTHER | Facility: HOSPITAL | Age: 26
End: 2021-04-15

## 2021-04-15 ENCOUNTER — TELEPHONE (OUTPATIENT)
Dept: OBGYN CLINIC | Facility: CLINIC | Age: 26
End: 2021-04-15

## 2021-04-15 NOTE — TELEPHONE ENCOUNTER
Notified that Mita is continuing to have severe pain for her hemorrhoids  Second time she has called this week since her visit on Monday  Called back to discuss the hemorrhoids  Discussed with Annetta  He reports that she has been having worsening pain this week  Last night was especially bad, and she has been having bad pain at night  She feels that it is "not going in and is drying up " She has been using the prescribed cream every time she uses the rest room and has also been using Tucks pads  Continues to have discomfort  Given ongoing and worsening symptoms despite treatment, advised to present for evaluation with rectal exam to evaluate hemorrhoids, evaluate for possible thrombosis, and discuss current treatment and options for escalation of treatment until end of pregnancy

## 2021-04-16 ENCOUNTER — ROUTINE PRENATAL (OUTPATIENT)
Dept: OBGYN CLINIC | Facility: CLINIC | Age: 26
End: 2021-04-16

## 2021-04-16 VITALS
BODY MASS INDEX: 21.85 KG/M2 | HEIGHT: 64 IN | HEART RATE: 92 BPM | SYSTOLIC BLOOD PRESSURE: 103 MMHG | WEIGHT: 128 LBS | DIASTOLIC BLOOD PRESSURE: 72 MMHG

## 2021-04-16 DIAGNOSIS — K64.9 HEMORRHOIDS, UNSPECIFIED HEMORRHOID TYPE: ICD-10-CM

## 2021-04-16 DIAGNOSIS — O22.43 HEMORRHOIDS DURING PREGNANCY IN THIRD TRIMESTER: Primary | ICD-10-CM

## 2021-04-16 PROCEDURE — 99213 OFFICE O/P EST LOW 20 MIN: CPT | Performed by: OBSTETRICS & GYNECOLOGY

## 2021-04-16 RX ORDER — HYDROCORTISONE 25 MG/G
CREAM TOPICAL 2 TIMES DAILY
Qty: 1 TUBE | Refills: 3 | Status: SHIPPED | OUTPATIENT
Start: 2021-04-16

## 2021-04-26 ENCOUNTER — ROUTINE PRENATAL (OUTPATIENT)
Dept: OBGYN CLINIC | Facility: CLINIC | Age: 26
End: 2021-04-26

## 2021-04-26 VITALS
SYSTOLIC BLOOD PRESSURE: 99 MMHG | HEART RATE: 86 BPM | HEIGHT: 64 IN | BODY MASS INDEX: 22.53 KG/M2 | DIASTOLIC BLOOD PRESSURE: 70 MMHG | WEIGHT: 132 LBS

## 2021-04-26 DIAGNOSIS — O22.43 HEMORRHOIDS DURING PREGNANCY IN THIRD TRIMESTER: ICD-10-CM

## 2021-04-26 DIAGNOSIS — Z3A.34 34 WEEKS GESTATION OF PREGNANCY: Primary | ICD-10-CM

## 2021-04-26 PROBLEM — O22.40 HEMORRHOIDS DURING PREGNANCY: Status: ACTIVE | Noted: 2021-04-26

## 2021-04-26 PROCEDURE — 99213 OFFICE O/P EST LOW 20 MIN: CPT | Performed by: OBSTETRICS & GYNECOLOGY

## 2021-04-26 NOTE — PATIENT INSTRUCTIONS
Coronavirus (OWYGN-51) pregnancy FAQs: Medical experts answer your questions  From [employer] cy  [id number]  bc (courtesy of [facility])  As of 3/18/20  By Katharine Negro 37  Medically reviewed by Society for Maternal-Fetal Medicine       We asked parents-to-be to send us their most pressing questions about coronavirus (COVID-19)  Among them: Is it still safe to deliver in a hospital? What if my ob-gyn has the virus? We sent those questions to the top docs at the Society for Maternal-Fetal Medicine  Here are their answers  The coronavirus (COVID-19) pandemic has been declared a national emergency in the [facility] by [employer]  Moms-to-be like you are concerned about everything from getting medicines to managing disruptions at work  But above and beyond any worry about lifestyle changes is a focus on your health and the impact of COVID-19 on your pregnancy  We asked obstetrics doctors who handle the most complicated pregnancy issues to answer your questions about the coronavirus  Here are their responses, provided by Dr Ken Rodgers and her colleagues at the Society for [place] 250  Am I at more risk for COVID-19 if I'm pregnant? We don't know  Pregnancy does change your immune system in ways that might make you more susceptible to viral respiratory infections like COVID-19  If you become infected, you might also be at higher risk for more severe illness compared to the general population  Although this does not appear to be the case with COVID-19, based on limited data so far, a higher risk has been true for pregnant women with other coronavirus infections (SARS-CoV and MERS-CoV) and other viral respiratory infections, such as flu  It's important to take preventive actions to avoid infection, such as washing your hands often and avoiding people who are sick      How might coronavirus affect my pregnancy? Again, we don't know  Women with other coronavirus infections (such as SARS-CoV) did not have miscarriage or stillbirth at higher rates than the general population  We know that having other respiratory viral infections during pregnancy, such as flu, has been associated with problems like low birth weight and  birth  Also, having a high fever early in pregnancy may increase the risk of certain birth defects  Could I transmit coronavirus to my baby during pregnancy or delivery? We don't know whether you could transmit COVID-19 to your baby before or during delivery  However, among the few case studies of infants born to mothers with COVID-23 published in peer-reviewed literature, none of the infants tested positive for the virus  Also, there was no virus detected in samples of amniotic fluid or breast milk  There have been a few reports of newborns as young as a few days old with infection, suggesting that a mother can transmit the infection to her infant through close contact after delivery  There have been no reports of mother-to-baby transmission for other coronaviruses (MERS-CoV and SARS-CoV), although only limited information is available  Is it safe for me to deliver at a hospital where there have been COVID-19 cases? Yes  We know that COVID-19 is a very scary virus  The good news is that hospitals are taking great precautions to keep patients and healthcare providers safe  When a patient is even suspected to have COVID-19, they are placed in a negative pressure room  (Think of these rooms as vacuums that suck and filter the air so it's safe for the other people in the hospital)  This makes it possible for you to deliver at the hospital without putting you or your baby at risk  Hospitals are also implementing stricter visiting policies to keep patients safe  It's worth calling your hospital to check if there are any new regulations to be aware of      What plans should I make now in case the hospital system is overwhelmed when it's time for me to deliver? This is a great question to talk with your doctor or midwife about when you're 34 to 36 weeks pregnant  Every hospital is making different plans for dealing with this scenario  I work in healthcare  Should I ask my doctor to excuse me from work until the baby is born? What if I work in a school, the travel [employer] [place] [employer], or some other high-risk setting? Healthcare facilities should take care to limit the exposure of pregnant employees to patients with confirmed or suspected COVID-19, just as they would with other infectious cases  If you continue working, be sure to follow the CDC's risk assessment and infection control guidelines  If you work in a school, travel [employer] [place] [employer], or other high-risk setting, talk with your employer about what it's doing to protect employees and minimize infection risks  Wash your hands often  What if my OB gets COVID-19? If your doctor or midwife tests positive for COVID-19, they will need to be quarantined until they recover and are no longer at risk of transmitting the virus  In this case, you'll be assigned to another OB in your doctor's practice (or you may choose another practitioner yourself)  Ask your new OB or your doctor's office if you should self-quarantine or be tested for the virus  (It will depend on when you last saw your provider and when that person tested positive )    Should we hold off on trying to conceive because of COVID-19? At this time, there's no reason to hold off on trying to get pregnant, but the data we have is really limited  For example, we don't think the virus causes birth defects or increases your risk of miscarriage  But we don't know whether you could transmit COVID-19 to your baby before or during delivery  We also don't know if the virus lives in semen or can be sexually transmitted      We have a babymoon scheduled in the next few months - should we cancel? If you're planning to travel internationally or on a cruise, you should strongly consider canceling  At this time, the virus has reached more than 140 countries, and there are travel bans to [place], most of [place], and [place]  Places where large numbers of people gather are at highest risk, especially airports and cruise ships  If you're planning travel in the [place] , note that any travel setting increases your risk of exposure, and there may be travel bans even in [place] by the time you're scheduled to go  Even if you're state is not blocked, you'll definitely want to avoid traveling to communities where the virus is spreading  To find out where these places are, check The [place] Times map based on CDC data  For the most current advice to help you avoid exposure, check the CDC's COVID-19 travel page  Will the hospital separate me from my  and keep the baby in quarantine? If you test positive for COVID-19 or have been exposed but have no symptoms, the CDC, [employer] of Obstetricians and Gynecologists, and the Society for [place] 250 all recommend that you be  from your baby to decrease the risk of transmission to the baby  This would last until you are no longer at risk of transmitting the virus  If you do not have COVID-19 and have not been exposed to the virus, the hospital will not separate you from your   My hospital is restricting visitors and only allowing one support person  If my support person leaves after the delivery, will they be allowed to come back? Every hospital has different policies  Contact your hospital or labor and delivery unit a week or so before delivery to get the most up-to-date restrictions  In general, if your support person needs to leave, they would be allowed back unless they knew they were exposed to COVID-19 after leaving your company    [facility] understands that the coronavirus (COVID-19) pandemic is an evolving story and that your questions will change over time  We'll continue asking moms and dads in our Community what they want to know, and we'll get the answers from experts to keep them - and you - informed and supported  My mom was planning to fly here to help me care for my new baby after delivery  Should I tell her not to come? Yes  If your mom is over 61 or has any serious chronic medical conditions (such as heart disease, lung disease, or diabetes), she is at higher risk of serious illness from COVID-19 and should avoid air travel  And remember that any travel setting increases a person's risk of exposure  So, it may be risky to have her around the baby after she has been traveling  For the most current advice on traveling, check the [facility]'s COVID-19 travel page  [facility] understands that the coronavirus pandemic is an evolving story and that your questions will change over time  We'll continue asking moms and dads in our Community what they want to know, and we'll get the answers from experts to keep them - and you - informed and supported  Pregnancy at 31 to 34 [address]:   You may continue to have symptoms such as shortness of breath, heartburn, contractions, or swelling of your ankles and feet  You may be gaining about 1 pound a week now  DISCHARGE INSTRUCTIONS:   Seek care immediately if:   · You develop a severe headache that does not go away  · You have new or increased vision changes, such as blurred or spotted vision  · You have new or increased swelling in your face or hands  · You have vaginal spotting or bleeding  · Your water broke or you feel warm water gushing or trickling from your vagina  Contact your healthcare provider if:   · You have more than 5 contractions in 1 hour  · You notice any changes in your baby's movements  · You have abdominal cramps, pressure, or tightening  · You have a change in vaginal discharge      · You have chills or a fever     · You have vaginal itching, burning, or pain  · You have yellow, green, white, or foul-smelling vaginal discharge  · You have pain or burning when you urinate, less urine than usual, or pink or bloody urine  · You have questions or concerns about your condition or care  How to care for yourself at this stage of your pregnancy:   · Eat a variety of healthy foods  Healthy foods include fruits, vegetables, whole-grain breads, low-fat dairy foods, beans, lean meats, and fish  Drink liquids as directed  Ask how much liquid to drink each day and which liquids are best for you  Limit caffeine to less than 200 milligrams each day  Limit your intake of fish to 2 servings each week  Choose fish low in mercury such as canned light tuna, shrimp, salmon, cod, or tilapia  Do not  eat fish high in mercury such as swordfish, tilefish, andreia mackerel, and shark  · Manage heartburn  by eating 4 or 5 small meals each day instead of large meals  Avoid spicy food  · Manage swelling  by lying down and putting your feet up  · Take prenatal vitamins as directed  Your need for certain vitamins and minerals, such as folic acid, increases during pregnancy  Prenatal vitamins provide some of the extra vitamins and minerals you need  Prenatal vitamins may also help to decrease the risk of certain birth defects  · Talk to your healthcare provider about exercise  Moderate exercise can help you stay fit  Your healthcare provider will help you plan an exercise program that is safe for you during pregnancy  · Do not smoke  Smoking increases your risk of a miscarriage and other health problems during your pregnancy  Smoking can cause your baby to be born too early or weigh less at birth  Ask your healthcare provider for information if you need help quitting  · Do not drink alcohol  Alcohol passes from your body to your baby through the placenta   It can affect your baby's brain development and cause fetal alcohol syndrome (FAS)  FAS is a group of conditions that causes mental, behavior, and growth problems  · Talk to your healthcare provider before you take any medicines  Many medicines may harm your baby if you take them when you are pregnant  Do not take any medicines, vitamins, herbs, or supplements without first talking to your healthcare provider  Never use illegal or street drugs (such as marijuana or cocaine) while you are pregnant  Safety tips during pregnancy:   · Avoid hot tubs and saunas  Do not use a hot tub or sauna while you are pregnant, especially during your first trimester  Hot tubs and saunas may raise your baby's temperature and increase the risk of birth defects  · Avoid toxoplasmosis  This is an infection caused by eating raw meat or being around infected cat feces  It can cause birth defects, miscarriages, and other problems  Wash your hands after you touch raw meat  Make sure any meat is well-cooked before you eat it  Avoid raw eggs and unpasteurized milk  Use gloves or ask someone else to clean your cat's litter box while you are pregnant  Changes that are happening with your baby:  By 34 weeks, your baby may weigh more than 5 pounds  Your baby will be about 12 ½ inches long from the top of the head to the rump (baby's bottom)  Your baby is gaining about ½ pound a week  Your baby's eyes open and close now  Your baby's kicks and movements are more forceful at this time  What you need to know about prenatal care: Your healthcare provider will check your blood pressure and weight  You may also need the following:  · A urine test  may also be done to check for sugar and protein  These can be signs of gestational diabetes or infection  Protein in your urine may also be a sign of preeclampsia  Preeclampsia is a condition that can develop during week 20 or later of your pregnancy   It causes high blood pressure, and it can cause problems with your kidneys and other organs  · A Tdap vaccine  may be recommended by your healthcare provider  · Fundal height  is a measurement of your uterus to check your baby's growth  This number is usually the same as the number of weeks that you have been pregnant  Your healthcare provider may also check your baby's position  · Your baby's heart rate  will be checked  © Copyright [address] Information is for End User's use only and may not be sold, redistributed or otherwise used for commercial purposes  All illustrations and images included in CareNotes® are the copyrighted property of A D A M , Inc  or [address] [place], [place] [address]viji   The above information is an  only  It is not intended as medical advice for individual conditions or treatments  Talk to your doctor, nurse or pharmacist before following any medical regimen to see if it is safe and effective for you

## 2021-04-26 NOTE — ASSESSMENT & PLAN NOTE
[address]  [facility] 21 wnl; next ultrasound 21 tomorrow  TWG: + 19 (recommended weight gain 25-35lbs)  Flu, Tdao vaccines declined  Contraception: declines, discussed natural family planning, condoms and withdrawal  Breastfeeding: yes  Birth plan: would like epidural  Discussed  labor precautions and fetal kick counts    Declines operative vaginal delivery

## 2021-04-26 NOTE — PROGRESS NOTES
34 weeks gestation of pregnancy  [address]  [facility] US 21 wnl; next ultrasound 21 tomorrow  TWG: + 19 (recommended weight gain 25-35lbs)  Flu, Tdao vaccines declined  Contraception: declines, discussed natural family planning, condoms and withdrawal  Breastfeeding: yes  Birth plan: would like epidural  Discussed  labor precautions and fetal kick counts    Declines operative vaginal delivery    [facility]  Partner unable to be tested due to lack on insurance  Third trimester urine culture negative      Hemorrhoids during pregnancy  Continue use of preparation H, Sitz baths      RTC 2 weeks    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

## 2021-04-27 ENCOUNTER — ULTRASOUND (OUTPATIENT)
Dept: PERINATAL CARE | Facility: CLINIC | Age: 26
End: 2021-04-27
Payer: COMMERCIAL

## 2021-04-27 VITALS
HEART RATE: 88 BPM | HEIGHT: 62 IN | DIASTOLIC BLOOD PRESSURE: 75 MMHG | BODY MASS INDEX: 24.33 KG/M2 | WEIGHT: 132.2 LBS | SYSTOLIC BLOOD PRESSURE: 121 MMHG

## 2021-04-27 DIAGNOSIS — Z3A.34 34 WEEKS GESTATION OF PREGNANCY: ICD-10-CM

## 2021-04-27 DIAGNOSIS — Z36.4 ULTRASOUND FOR ANTENATAL SCREENING FOR FETAL GROWTH RESTRICTION: Primary | ICD-10-CM

## 2021-04-27 PROCEDURE — 76816 OB US FOLLOW-UP PER FETUS: CPT | Performed by: OBSTETRICS & GYNECOLOGY

## 2021-04-27 PROCEDURE — 99214 OFFICE O/P EST MOD 30 MIN: CPT | Performed by: OBSTETRICS & GYNECOLOGY

## 2021-04-27 NOTE — PROGRESS NOTES
Please refer to the [facility] ultrasound report in Ob Procedures for additional information regarding today's visit

## 2021-04-27 NOTE — LETTER
2021     [address] PROVIDER    Patient: Jan Flores   YOB: 1995   Date of Visit: 2021       Dear   Provider: Thank you for referring Jan Flores to me for evaluation  Below are my notes for this consultation  If you have questions, please do not hesitate to call me  I look forward to following your patient along with you  Sincerely,         [address] [place]        CC: No Recipients  Stephen Garvin MD  2021  8:14 AM  Sign when Signing Visit   Please refer to the [facility] ultrasound report in Ob Procedures for additional information regarding today's visit

## 2021-05-10 ENCOUNTER — ROUTINE PRENATAL (OUTPATIENT)
Dept: OBGYN CLINIC | Facility: CLINIC | Age: 26
End: 2021-05-10

## 2021-05-10 VITALS
SYSTOLIC BLOOD PRESSURE: 117 MMHG | BODY MASS INDEX: 22.53 KG/M2 | HEIGHT: 64 IN | HEART RATE: 88 BPM | DIASTOLIC BLOOD PRESSURE: 72 MMHG | WEIGHT: 132 LBS

## 2021-05-10 DIAGNOSIS — Z23 NEED FOR TDAP VACCINATION: Primary | ICD-10-CM

## 2021-05-10 DIAGNOSIS — Z34.93 PRENATAL CARE IN THIRD TRIMESTER: ICD-10-CM

## 2021-05-10 PROCEDURE — 87150 DNA/RNA AMPLIFIED PROBE: CPT | Performed by: STUDENT IN AN ORGANIZED HEALTH CARE EDUCATION/TRAINING PROGRAM

## 2021-05-10 PROCEDURE — 99213 OFFICE O/P EST LOW 20 MIN: CPT | Performed by: OBSTETRICS & GYNECOLOGY

## 2021-05-10 PROCEDURE — 90715 TDAP VACCINE 7 YRS/> IM: CPT

## 2021-05-10 PROCEDURE — 90471 IMMUNIZATION ADMIN: CPT

## 2021-05-10 NOTE — PATIENT INSTRUCTIONS
Pregnancy at 28 to 38 Weeks   AMBULATORY CARE:   What changes are happening to your body: You are considered full term at the beginning of 37 weeks  Your breathing may be easier if your baby has moved down into a head-down position  You may need to urinate more often because the baby may be pressing on your bladder  You may also feel more discomfort and get tired easily  Seek care immediately if:   · You develop a severe headache that does not go away  · You have new or increased vision changes, such as blurred or spotted vision  · You have new or increased swelling in your face or hands  · You have vaginal spotting or bleeding  · Your water broke or you feel warm water gushing or trickling from your vagina  Contact your healthcare provider if:   · You have more than 5 contractions in 1 hour  · You notice any changes in your baby's movements  · You have abdominal cramps, pressure, or tightening  · You have a change in vaginal discharge  · You have chills or a fever  · You have vaginal itching, burning, or pain  · You have yellow, green, white, or foul-smelling vaginal discharge  · You have pain or burning when you urinate, less urine than usual, or pink or bloody urine  · You have questions or concerns about your condition or care  How to care for yourself at this stage of your pregnancy:   · Eat a variety of healthy foods  Healthy foods include fruits, vegetables, whole-grain breads, low-fat dairy foods, beans, lean meats, and fish  Drink liquids as directed  Ask how much liquid to drink each day and which liquids are best for you  Limit caffeine to less than 200 milligrams each day  Limit your intake of fish to 2 servings each week  Choose fish low in mercury such as canned light tuna, shrimp, salmon, cod, or tilapia  Do not  eat fish high in mercury such as swordfish, tilefish, andreia mackerel, and shark  · Take prenatal vitamins as directed    Your need for certain vitamins and minerals, such as folic acid, increases during pregnancy  Prenatal vitamins provide some of the extra vitamins and minerals you need  Prenatal vitamins may also help to decrease the risk of certain birth defects  · Rest as needed  Put your feet up if you have swelling in your ankles and feet  · Do not smoke  Smoking increases your risk of a miscarriage and other health problems during your pregnancy  Smoking can cause your baby to be born early or weigh less at birth  Ask your healthcare provider for information if you need help quitting  · Do not drink alcohol  Alcohol passes from your body to your baby through the placenta  It can affect your baby's brain development and cause fetal alcohol syndrome (FAS)  FAS is a group of conditions that causes mental, behavior, and growth problems  · Talk to your healthcare provider before you take any medicines  Many medicines may harm your baby if you take them when you are pregnant  Do not take any medicines, vitamins, herbs, or supplements without first talking to your healthcare provider  Never use illegal or street drugs (such as marijuana or cocaine) while you are pregnant  · Talk to your healthcare provider before you travel  You may not be able to travel in an airplane after 36 weeks  He may also recommend that you avoid long road trips  Safety tips during pregnancy:   · Avoid hot tubs and saunas  Do not use a hot tub or sauna while you are pregnant, especially during your first trimester  Hot tubs and saunas may raise your baby's temperature and increase the risk of birth defects  · Avoid toxoplasmosis  This is an infection caused by eating raw meat or being around infected cat feces  It can cause birth defects, miscarriages, and other problems  Wash your hands after you touch raw meat  Make sure any meat is well-cooked before you eat it  Avoid raw eggs and unpasteurized milk   Use gloves or ask someone else to clean your cat's litter box while you are pregnant  · Ask your healthcare provider about travel  The most comfortable time to travel is during the second trimester  Ask your healthcare provider if you can travel after 36 weeks  You may not be able to travel in an airplane after 36 weeks  He may also recommend that you avoid long road trips  Changes that are happening with your baby:  By 38 weeks, your baby may weigh between 6 and 9 pounds  Your baby may be about 14 inches long from the top of the head to the rump (baby's bottom)  Your baby hears well enough to know your voice  As your baby gets larger, you may feel fewer kicks and more stretching and rolling  Your baby may move into a head-down position  Your baby will also rest lower in your abdomen  What you need to know about prenatal care: Your healthcare provider will check your blood pressure and weight  You may also need the following:  · A urine test  may also be done to check for sugar and protein  These can be signs of gestational diabetes or infection  Protein in your urine may also be a sign of preeclampsia  Preeclampsia is a condition that can develop during week 20 or later of your pregnancy  It causes high blood pressure, and it can cause problems with your kidneys and other organs  · A blood test  may be done to check for anemia (low iron level)  · A Tdap vaccine  may be recommended by your healthcare provider  · A group B strep test  is a test that is done to check for group B strep infection  Group B strep is a type of bacteria that may be found in the vagina or rectum  It can be passed to your baby during delivery if you have it  Your healthcare provider will take swab your vagina or rectum and send the sample to the lab for tests  · Fundal height  is a measurement of your uterus to check your baby's growth  This number is usually the same as the number of weeks that you have been pregnant   Your healthcare provider may also check your baby's position  · Your baby's heart rate  will be checked  © Copyright [address] Information is for End User's use only and may not be sold, redistributed or otherwise used for commercial purposes  All illustrations and images included in CareNotes® are the copyrighted property of A D A M , Inc  or Mychal Rey   The above information is an  only  It is not intended as medical advice for individual conditions or treatments  Talk to your doctor, nurse or pharmacist before following any medical regimen to see if it is safe and effective for you

## 2021-05-12 PROBLEM — Z22.330 GBS CARRIER: Status: ACTIVE | Noted: 2021-05-12

## 2021-05-12 LAB — GP B STREP DNA SPEC QL NAA+PROBE: POSITIVE

## 2021-05-17 ENCOUNTER — ROUTINE PRENATAL (OUTPATIENT)
Dept: OBGYN CLINIC | Facility: CLINIC | Age: 26
End: 2021-05-17

## 2021-05-17 VITALS
BODY MASS INDEX: 23.05 KG/M2 | HEIGHT: 64 IN | SYSTOLIC BLOOD PRESSURE: 119 MMHG | HEART RATE: 94 BPM | WEIGHT: 135 LBS | DIASTOLIC BLOOD PRESSURE: 77 MMHG

## 2021-05-17 DIAGNOSIS — Z3A.37 37 WEEKS GESTATION OF PREGNANCY: Chronic | ICD-10-CM

## 2021-05-17 DIAGNOSIS — Z22.330 GBS CARRIER: Primary | ICD-10-CM

## 2021-05-17 PROCEDURE — 99213 OFFICE O/P EST LOW 20 MIN: CPT | Performed by: OBSTETRICS & GYNECOLOGY

## 2021-05-17 NOTE — PROGRESS NOTES
OB/GYN  PN Visit  Sarai Price  [id number]  5/17/2021  9:40 AM  Dr Lorin Dominguez MD    S: 30 y o  Trevor Mccrary 37w0d here for PN visit  OB complaints:  Ctxns: no  Leakage: no  Bleeding: no  Fetal movement: yes    She reports that she is feeling well   She is experiencing acid reflux at this time because she has not had breakfast     O:  Vitals:    05/17/21 0900   BP: 119/77   Pulse: 94       Gen: no acute distress, nonlabored breathing  Fundal height: 36cm  FHT: 155bpm       Presentation: vertex    A/P:    Problem List Items Addressed This Visit        Other    37 weeks gestation of pregnancy (Chronic)     Declines contraception  RTC in 1 week         GBS carrier - Primary     Will need PCN in labor                   Lorin Dominguez MD  5/17/2021  9:40 AM

## 2021-05-24 PROBLEM — Z3A.38 38 WEEKS GESTATION OF PREGNANCY: Status: ACTIVE | Noted: 2021-01-19

## 2021-05-25 ENCOUNTER — ROUTINE PRENATAL (OUTPATIENT)
Dept: OBGYN CLINIC | Facility: CLINIC | Age: 26
End: 2021-05-25

## 2021-05-25 VITALS
DIASTOLIC BLOOD PRESSURE: 84 MMHG | HEIGHT: 64 IN | HEART RATE: 87 BPM | WEIGHT: 132 LBS | SYSTOLIC BLOOD PRESSURE: 124 MMHG | BODY MASS INDEX: 22.53 KG/M2

## 2021-05-25 DIAGNOSIS — Z22.330 GBS CARRIER: ICD-10-CM

## 2021-05-25 DIAGNOSIS — D57.3 SICKLE CELL TRAIT (HCC): Primary | ICD-10-CM

## 2021-05-25 DIAGNOSIS — Z3A.38 38 WEEKS GESTATION OF PREGNANCY: ICD-10-CM

## 2021-05-25 PROBLEM — Z13.79 GENETIC SCREENING: Status: RESOLVED | Noted: 2021-01-29 | Resolved: 2021-05-25

## 2021-05-25 PROCEDURE — 99213 OFFICE O/P EST LOW 20 MIN: CPT | Performed by: NURSE PRACTITIONER

## 2021-05-25 NOTE — PATIENT INSTRUCTIONS
Pregnancy at 28 to 38 Weeks   AMBULATORY CARE:   What changes are happening to your body: You are considered full term at the beginning of 37 weeks  Your breathing may be easier if your baby has moved down into a head-down position  You may need to urinate more often because the baby may be pressing on your bladder  You may also feel more discomfort and get tired easily  Seek care immediately if:   · You develop a severe headache that does not go away  · You have new or increased vision changes, such as blurred or spotted vision  · You have new or increased swelling in your face or hands  · You have vaginal spotting or bleeding  · Your water broke or you feel warm water gushing or trickling from your vagina  Contact your healthcare provider if:   · You have more than 5 contractions in 1 hour  · You notice any changes in your baby's movements  · You have abdominal cramps, pressure, or tightening  · You have a change in vaginal discharge  · You have chills or a fever  · You have vaginal itching, burning, or pain  · You have yellow, green, white, or foul-smelling vaginal discharge  · You have pain or burning when you urinate, less urine than usual, or pink or bloody urine  · You have questions or concerns about your condition or care  How to care for yourself at this stage of your pregnancy:   · Eat a variety of healthy foods  Healthy foods include fruits, vegetables, whole-grain breads, low-fat dairy foods, beans, lean meats, and fish  Drink liquids as directed  Ask how much liquid to drink each day and which liquids are best for you  Limit caffeine to less than 200 milligrams each day  Limit your intake of fish to 2 servings each week  Choose fish low in mercury such as canned light tuna, shrimp, salmon, cod, or tilapia  Do not  eat fish high in mercury such as swordfish, tilefish, andreia mackerel, and shark  · Take prenatal vitamins as directed    Your need for certain vitamins and minerals, such as folic acid, increases during pregnancy  Prenatal vitamins provide some of the extra vitamins and minerals you need  Prenatal vitamins may also help to decrease the risk of certain birth defects  · Rest as needed  Put your feet up if you have swelling in your ankles and feet  · Do not smoke  Smoking increases your risk of a miscarriage and other health problems during your pregnancy  Smoking can cause your baby to be born early or weigh less at birth  Ask your healthcare provider for information if you need help quitting  · Do not drink alcohol  Alcohol passes from your body to your baby through the placenta  It can affect your baby's brain development and cause fetal alcohol syndrome (FAS)  FAS is a group of conditions that causes mental, behavior, and growth problems  · Talk to your healthcare provider before you take any medicines  Many medicines may harm your baby if you take them when you are pregnant  Do not take any medicines, vitamins, herbs, or supplements without first talking to your healthcare provider  Never use illegal or street drugs (such as marijuana or cocaine) while you are pregnant  · Talk to your healthcare provider before you travel  You may not be able to travel in an airplane after 36 weeks  He may also recommend that you avoid long road trips  Safety tips during pregnancy:   · Avoid hot tubs and saunas  Do not use a hot tub or sauna while you are pregnant, especially during your first trimester  Hot tubs and saunas may raise your baby's temperature and increase the risk of birth defects  · Avoid toxoplasmosis  This is an infection caused by eating raw meat or being around infected cat feces  It can cause birth defects, miscarriages, and other problems  Wash your hands after you touch raw meat  Make sure any meat is well-cooked before you eat it  Avoid raw eggs and unpasteurized milk   Use gloves or ask someone else to clean your cat's litter box while you are pregnant  · Ask your healthcare provider about travel  The most comfortable time to travel is during the second trimester  Ask your healthcare provider if you can travel after 36 weeks  You may not be able to travel in an airplane after 36 weeks  He may also recommend that you avoid long road trips  Changes that are happening with your baby:  By 38 weeks, your baby may weigh between 6 and 9 pounds  Your baby may be about 14 inches long from the top of the head to the rump (baby's bottom)  Your baby hears well enough to know your voice  As your baby gets larger, you may feel fewer kicks and more stretching and rolling  Your baby may move into a head-down position  Your baby will also rest lower in your abdomen  What you need to know about prenatal care: Your healthcare provider will check your blood pressure and weight  You may also need the following:  · A urine test  may also be done to check for sugar and protein  These can be signs of gestational diabetes or infection  Protein in your urine may also be a sign of preeclampsia  Preeclampsia is a condition that can develop during week 20 or later of your pregnancy  It causes high blood pressure, and it can cause problems with your kidneys and other organs  · A blood test  may be done to check for anemia (low iron level)  · A Tdap vaccine  may be recommended by your healthcare provider  · A group B strep test  is a test that is done to check for group B strep infection  Group B strep is a type of bacteria that may be found in the vagina or rectum  It can be passed to your baby during delivery if you have it  Your healthcare provider will take swab your vagina or rectum and send the sample to the lab for tests  · Fundal height  is a measurement of your uterus to check your baby's growth  This number is usually the same as the number of weeks that you have been pregnant   Your healthcare provider may also check your baby's position  · Your baby's heart rate  will be checked  © Copyright [address] Information is for End User's use only and may not be sold, redistributed or otherwise used for commercial purposes  All illustrations and images included in CareNotes® are the copyrighted property of A D A M , Inc  or Mychal Rey   The above information is an  only  It is not intended as medical advice for individual conditions or treatments  Talk to your doctor, nurse or pharmacist before following any medical regimen to see if it is safe and effective for you  Fetal Movement   WHAT YOU NEED TO KNOW:   Fetal movements are the kicks, rolls, and hiccups of your unborn baby  You may start to feel these movements when you are 20 weeks pregnant  The movements grow stronger and more frequent as your baby grows  Fetal movements show that your unborn baby is getting the oxygen and nutrients he needs before birth  Fewer fetal movements may signal a problem with your baby's health  DISCHARGE INSTRUCTIONS:   Follow up with your healthcare provider or obstetrician as directed:  Write down your questions so you remember to ask them during your visits  Normal fetal movement:  Fetal activity can be described by 4 states, from least to most active  During quiet sleep, your unborn baby may be still for up to 2 hours  During active sleep, he kicks, rolls, and moves often  During the quiet awake state, he may only move his eyes  The active awake state includes strong kicks and rolls  What affects fetal movement:  You may feel your baby move more after you eat, or after you drink caffeine  You may feel your baby move less while you are more active, such as when you exercise  You may also feel fewer movements if you are obese  Certain medicines can change your baby's movements  Tell your healthcare provider about the medicines you are taking     Track fetal movements at home:  Fetal movement is most often felt when you lie quietly on your side  Your healthcare provider may ask you to count movements for 2 hours  He may ask you to track how long it takes for your baby to move 10 times  Keep a log of your baby's movements  Contact your healthcare provider or obstetrician if:   · It takes longer than usual to feel 10 of your unborn baby's movements  · You do not feel your unborn baby move at least 10 times in 2 hours  · The skin on your hands, feet, and around your eyes is more swollen than usual      · You have a headache for at least 24 hours  · Tiny red dots appear on your skin  · Your belly is tender when you press on it  · You have questions or concerns about your condition or care  Return to the emergency department if:   · You do not feel your unborn baby move for 12 hours  · You feel cramping or constant pain in your abdomen  · You have heavy bleeding from your vagina  · You have a severe headache and cannot see clearly  · You are having trouble breathing or are vomiting  · You have a seizure  © Copyright [address] Information is for End User's use only and may not be sold, redistributed or otherwise used for commercial purposes  All illustrations and images included in CareNotes® are the copyrighted property of A D A M , Inc  or [address] [place] [address]  The above information is an  only  It is not intended as medical advice for individual conditions or treatments  Talk to your doctor, nurse or pharmacist before following any medical regimen to see if it is safe and effective for you  Vaccine Decision Making  Im pregnant  Should I get a COVID vaccine? The information here is about the [place] and Moderna COVID-19 vaccines  These are also called mRNA vaccines  Reference numbers written like this (#) correspond to the footnotes at the end of this section      For most people, getting the COVID vaccine as soon as possible is the safest choice  However, these vaccines have not been tested in pregnant and breastfeeding women yet  The information below will help you make an informed choice about whether to get an mRNA COVID vaccine while you are pregnant or trying to get pregnant  Your options:   Get a COVID vaccine as soon as it is available    Wait for more information about the vaccines in pregnancy    What are the benefits of getting an mRNA COVID Vaccine? 1  COVID is dangerous  It is more dangerous for pregnant women   COVID patients who are pregnant are 5 times more likely to end up in the intensive care unit (ICU) or on a ventilator than COVID patients who are not pregnant  (#1)    birth may be more common for pregnant women with severe COVID  (#2)   Pregnant women are more likely to die of COVID than non-pregnant women with COVID who are the same age  (#3,4)    2  The mRNA COVID vaccines prevent about 95% of COVID infections   As COVID infections go up in our communities, your risk of getting COVID goes up too   Getting a vaccine will prevent you from getting COVID and may help keep you from giving COVID to people around you, like your family  3  The mRNA COVID vaccines cannot give you COVID   These vaccines have no live virus  (#5)   These vaccines do NOT contain ingredients that are known to be harmful to pregnant women or to the fetus   Many vaccines are routinely given in pregnancy and are safe (for example: tetanus, diphtheria, and flu)  More details about how these vaccines work can be found below in the section "More information about mRNA COVID vaccines"  What are the risks of getting an mRNA COVID vaccine? 1  These COVID vaccines have not yet been tested in pregnant people   These vaccines were tested in over 40,000 people, and there were no serious side effects related to the vaccine   We do not know if the vaccines work as well in pregnant people as they did in non-pregnant people     We do not know whether there are unique downsides in pregnancy, like different side effects or an increased risk of miscarriage or fetal abnormalities   The Moderna vaccine was tested in female rats to look at its effects on pregnancy  No significant negative effects were found on female fertility or fetal development   Some women became pregnant during the vaccine studies  Eighteen of these women were in the vaccine group, and two months later none had miscarried  There were [de-identified] women in the placebo group who became pregnant, and two months later two of them had had miscarriages  (In general, 10-20% of pregnancies end in miscarriage)   Because these studies are still ongoing, we dont know how the rest of the pregnancy went for these women  2  People getting the vaccine will probably have some side effects   Many people had symptoms caused by their immune systems normal response to the vaccine  The most common side effects were:(#6)   injection site reactions like sore arm (~84%)   fatigue (~62%)   headache (~55%)   muscle pain (~38%)   chills (~32%)   joint pain (~24%)   fever (~14%)   Of 100 people who get the vaccine, 1 will get a high fever (over 102°F)  A persistent high fever during the first trimester might increase the risk of fetal abnormalities or miscarriage  The CDC recommends using Tylenol (acetaminophen) during pregnancy if you have a high fever  Another option is to delay your COVID vaccine until after the first trimester  What do the experts recommend? Because COVID is dangerous and easily spread, the CDC says that the mRNA vaccines for COVID-19 are recommended for adults  (#7)  However, because there are no studies of pregnant women yet, there are no clear recommendations for pregnant women  This is standard for a new drug and is not due to any particular concern with this vaccine       The Society for Maternal-Fetal Medicine strongly recommends that pregnant individuals have access to COVID vaccines  They recommend that each person talk to their doctor or midwife about their own personal choice  (#8)    The [address] and Gynecologists recommends that the COVID vaccine should not be withheld from pregnant individuals  (#9)    What else should I think about to help me decide? Make sure you understand as much as you can about COVID and about the vaccine  Ask a trusted source, like your midwife or doctor  Continue reading below for more information about the vaccine  Think about your own personal risk  Look at the columns below and think about your risk of getting COVID (left)  Think about your safety - are you able to stay safe (right)? The risks of getting sick from [facility] [place] [facility]  are higher if If you are not at higher risk for COVID  and   ?? You have contact with people  outside your home ? ? You are always able to wear a mask   ? ? You are 28years old or older ? ? You and the people you live with  can socially distance from others for  your whole pregnancy   ? ? You are overweight ? ? Your community does NOT have  high or increasing COVID cases   ? ? You have other medical problems  like diabetes, high blood pressure,  or heart disease ? ? You think the vaccine itself will make  you very nervous (you are more  worried about the unknown risks  than about getting COVID)   ? ? You are a smoker ? ? You have had a severe allergic  reaction to a vaccine   ? ? You are a racial or ethnic minority, or your community has a high rate of COVID infections    ? ? You are a healthcare worker(#10)    If you are at a higher risk of getting  COVID, it probably makes sense to get  the vaccine   it might make sense for you to wait  for more information  What about breastfeeding? The Society for [address] [place] [address] Street of Breastfeeding Medicine report that there is no reason to believe that the vaccine affects the safety of breastmilk  8,11 The vaccine does not contain the virus, so there is no risk of infecting your baby  Because mRNA is fragile, it is very unlikely that any part of the vaccine gets into breastmilk  When we have an infection or get a vaccine, our bodies make antibodies to fight the infection  Antibodies can pass into the breastmilk and then to the baby - and may help prevent infections  Summary  1  COVID seems to cause more harm in pregnant women than in women of the same age who are not pregnant  2  The risks of getting an mRNA COVID vaccine during pregnancy are thought to be small but are not totally known  3  You should consider your own personal risk of getting COVID  If your personal risk is high, or there are many cases of COVID in your community, it probably makes sense for you to get a vaccine while pregnant  4  Whether to get a COVID vaccine during pregnancy is your choice  What do pregnant doctors think? 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, so for me the choice is clear, I intend to be first in line as soon as they will let me have one " (Pregnant Emergency Department Doctor)    "I am a little nervous about getting something that hasnt been tested in pregnant patients  Early pregnancy is a nerve-wracking time, even without the unknown of a new vaccine  So, I went over the risks and benefits of getting or not getting it as a front- - with myself, my partner, and my doctors  We ended up deciding I should get the vaccine " (Pregnant Emergency Department Doctor)    "Im 34 weeks and I'm going to try to get vaccinated after delivery, but during pregnancy I'm holding out   Pregnant women were excluded from the studies and, in the meantime, I don't see COVID patients at work so I feel like my exposure will be low during this second wave " (Pregnant physician)    "I am still breastfeeding my baby, and I think the risk of exposing my infant and other children and partner to Arnaldo is far greater than any theoretical risk this novel vaccine may have  Ive decided to get vaccinated whenever it becomes available " (Breastfeeding OB/GYN Doctor)      Do you have more questions? Call your doctor or midwife to talk about your own personal decision  Thoughts about this tool? Was this decision aid helpful? Please take a moment to give us feedback about this decision aid at [email or web address] or by scanning the QR code below  We need your help! Tell the CDC about your experience with the vaccine  If you decide to get the vaccine, you will get a V-safe information sheet with instructions about the V-safe website and trudi  Consider registering so we can better  women in the future  More information about mRNA COVID Vaccines  How do mRNA COVID vaccines work?  The Pfizer and Moderna COVID vaccines are mRNA vaccines (messenger RNA)   mRNA is not new - our bodies are full of it  mRNA vaccines have been studied for the past two decades   mRNA vaccines mimic how viruses work  The mRNA is like a recipe card that goes into your body and makes one recipe for a brief time  The recipe is for a small part of the virus (the spike protein)   When this spike protein is released from cells, the body recognizes it as foreign and the immune system responds  This immune response causes the side effect symptoms (like aches and fever) but leads to improved immunity   mRNA breaks down quickly, so it only lasts a brief time   This is also how the other viruses like a cold virus work - viruses use our body and cells to make their proteins  Then our immune system attacks those proteins to keep us healthy   There is no way for the vaccine to give people COVID  (#5)    What did the research show?   The Pfizer and Moderna mRNA vaccine trials each had over 30,000 people (including those who got placebo) and showed that the vaccine lowers a persons chance of getting COVID and severe COVID  In each study, over 15,000 people got the vaccine and over 15,000 people got a saline injection (placebo)   After one dose, the vaccine appears to be 50% effective  After 2 doses, both vaccines are about 95% effective   In other words, for every 100 people who got COVID in the placebo group, only 5 people got COVID in the mRNA vaccine groups   Severe cases of COVID were also reduced in both mRNA vaccine groups   There were no serious safety concerns  Intended Use   This decision aid is intended for use by pregnant women (and women planning on becoming pregnant) who are considering getting the COVID-19 vaccine, as well as their  healthcare providers, and their friends and family  It was created by the Shared Decision-Making: COVID Vaccination in Pregnancy working group at the [facility]  This group consists of experts in the fields of OB/GYN, Maternal-Fetal Medicine, Shared Decision-Making and risk communication, Emergency Medicine, and current COVID-19 research  Questions should be directed to Dr [email or web address]  Feedback regarding the utility of this decision aid can be directed through the survey (see link above)  This decision aid can be reproduced and distributed without additional permission  Polish and [place] versions available at http://foamcast org/COVIDvacPregnancy  Updated 2020  1  Yobany SIMMONS, et al  Pregnant women with severe or critical COVID-19 have increased composite morbidity compared to  non-pregnant matched controls  Am J Obstet 2020 doi: 10 1016/j ajog  11 022  2  Evonne MONTELONGO, et al  Pregnancy outcomes among women with and without severe acute respiratory syndrome coronavirus  2 infection  [facility] 2020 3(11):[id number]  3  CORY Smith working group on COVID-19   Maternal and  Outcomes of Pregnancy Women with SARScoV-  2 infection  Ultrasound Obstet Gynecol  2020 Sept  doi: 10 1002/uog  04158  4  Centers for Disease Control and Prevention  Update: Characteristics of Symptomatic Women of Reproductive Age with  Laboratory-Confirmed SARS-CoV-2 Infection by Pregnancy Status -- [facility], January 22-October 3, 2020  November 2020:1-7   5  Hanane JAVED  COVID-19 and mRNA Vaccines--First Large Test for a New Approach  ROSEANNE  2020;324(12):0162-4033  doi:10 1001/roseanne  [id number] [id number]  6  [facility]  7  [employer] com br ([address], 2020)  8  SM statement on COVID vaccination in pregnancy: https://www  [facility] org/publications/339-society-for-maternal-fetalmedicine-  smfm-statement-sars-cov-2-vaccination-in-pregnancy  9  [employer] com au-  Lactating-Patients-Against-COVID-19 (Accessed December 14, 2020)  10  Elizabet Vanessa et al  Occupation and risk of severe COVID-19: prospective cohort study of  [facility] participants  Occupational and Environmental Medicine Published Online First: 09 December 2020   doi: 10 1136/vujrx-[id number]  11  [email or web address] [email or web address]-for-[email or web address]-19-vaccination-in-lactation

## 2021-05-25 NOTE — PROGRESS NOTES
Mejia Ceron presents today for routine OB visit at 38w0d  Blood Pressure: 124/84  Wt=59 9 kg (132 lb); Body mass index is 22 66 kg/m² ; TWG=8 891 kg (19 lb 9 6 oz)  Fetal Heart Rate: 148; Fundal Height (cm): 36 cm  Abdomen: gravid, soft, non-tender  She denies any headaches, visual changes or right upper quadrant pain  Denies uterine contractions  Denies vaginal bleeding or leaking of fluid  Reports adequate fetal movement of at least 10 movements in 2 hours once daily  GBS positive- treat in labor   Anemia- hgb 10 0 3/13/2021, on iron and colace,  1 hr GTT was 110  Sickle cell trait,  not tested  Patient denies any urinary symptoms  Has had Tdap  Hemorrhoids-currently with no pain is using medication  Contraception-declines  Perineal conditioning reviewed with patient and her partner  Breast feeding and bottle feeding  Last US 4/27/2021 at 34 wks 4 d with ÁNGEL 6/4/021  Scheduled for ultrasound as indicated  Reviewed  labor precautions and fetal kick counts  Advised to continue medications and return in 1 weeks  COVID 19 precautions were discussed with patient, reviewed symptoms, masking, hygiene, social distancing, avoid high risk gatherings, patient to call office with questions or concerns  Declines COVID vaccine at this time, considering after delivery      Current Outpatient Medications   Medication Instructions    docusate sodium (COLACE) 100 mg, Oral, 2 times daily    ferrous sulfate 324 mg, Oral, 2 times daily before meals    hydrocortisone (ANUSOL-HC) 2 5 % rectal cream Topical, 2 times daily    Misc   Devices ([place] Roxo) MISC Does not apply, 2 times daily    polyethylene glycol (GLYCOLAX) 17 g, Oral, Daily    polyethylene glycol (MIRALAX) 17 g, Oral, Daily    Prenatal MV-Min-Fe Fum-FA-DHA (PRENATAL 1 PO) 1 tablet, Oral, Daily    shark liver oil-cocoa butter (PREPARATION H) 0 25-3-85 5 % suppository 1 suppository, Rectal, As needed         Pregnancy Problems (from 12/14/20 to present)     Problem Noted Resolved    GBS carrier 5/12/2021 by Yajaira Knowles MD No    Genetic screening 1/29/2021 by Asim Barrientos MD No    37 weeks gestation of pregnancy 1/19/2021 by Bhanu Vazquez MD No    16 weeks gestation of pregnancy 12/22/2020 by Brina Goldberg MD 2/16/2021 by Silvio Bradford MD

## 2021-06-01 ENCOUNTER — ROUTINE PRENATAL (OUTPATIENT)
Dept: OBGYN CLINIC | Facility: CLINIC | Age: 26
End: 2021-06-01

## 2021-06-01 VITALS
SYSTOLIC BLOOD PRESSURE: 120 MMHG | HEIGHT: 64 IN | DIASTOLIC BLOOD PRESSURE: 80 MMHG | BODY MASS INDEX: 23.22 KG/M2 | WEIGHT: 136 LBS | HEART RATE: 84 BPM

## 2021-06-01 DIAGNOSIS — Z3A.39 39 WEEKS GESTATION OF PREGNANCY: Primary | ICD-10-CM

## 2021-06-01 PROCEDURE — 99213 OFFICE O/P EST LOW 20 MIN: CPT | Performed by: OBSTETRICS & GYNECOLOGY

## 2021-06-03 NOTE — PROGRESS NOTES
OB/GYN prenatal visit    S: 30 y o  Fay Olea 39w1d here for PN visit  Patient reports irregular contractions but denies vaginal bleeding, loss of fluid, or decreased fetal movement  O:  Vitals:    06/01/21 1000   BP: 120/80   Pulse: 84       Gen: no acute distress, nonlabored breathing  Fundal Height (cm): 37 cm  Fetal Heart Rate: 145   TAUS: vertex  SVE: ft/thick/high    A/P:    IUP at 39w1d  Contractions likely due to irving mitchell contraction  Increased fluid hydration  GBS positive: will need penicillin  PNC  4/27 2299 grams - 5 lbs 1 oz (41%)  TWG: + 23 pounds (recommended weight gain 25-35pounds)  Flu vaccine n/a, TDAP vaccine 5/10/21  Contraception: declines, patient is aware she can get pregnant afterwards  Breastfeeding: yes  Birth plan: yes with epidural  Discussed labor precautions and fetal kick counts    Return to clinic in 1 week to discuss about scheduling IOL  Patient would like to go into labor spontaneously but is agreeable to 41w induction for late term      Anemia due to sickle cell trait  Last hgb 10 0  Continue iron supplement and stool softener  Unable for FOB to be tested for sickle cell trait due to insurance  Urine culture is negative, asymptomatic    Hemorrhoid  Currently asymptomatic    COVID 19 precautions were discussed with patient at length, reviewed symptoms, hygiene, social distancing, patient to call office 24/7 with questions/concerns    Patricia Sue MD  6/3/2021  1:28 AM

## 2021-06-07 ENCOUNTER — NURSE TRIAGE (OUTPATIENT)
Dept: OTHER | Facility: OTHER | Age: 26
End: 2021-06-07

## 2021-06-07 ENCOUNTER — ANESTHESIA (INPATIENT)
Dept: ANESTHESIOLOGY | Facility: HOSPITAL | Age: 26
DRG: 560 | End: 2021-06-07
Payer: COMMERCIAL

## 2021-06-07 ENCOUNTER — ANESTHESIA EVENT (INPATIENT)
Dept: ANESTHESIOLOGY | Facility: HOSPITAL | Age: 26
DRG: 560 | End: 2021-06-07
Payer: COMMERCIAL

## 2021-06-07 ENCOUNTER — TELEPHONE (OUTPATIENT)
Dept: LABOR AND DELIVERY | Facility: HOSPITAL | Age: 26
End: 2021-06-07

## 2021-06-07 ENCOUNTER — HOSPITAL ENCOUNTER (INPATIENT)
Facility: HOSPITAL | Age: 26
LOS: 2 days | Discharge: HOME | DRG: 560 | End: 2021-06-09
Attending: OBSTETRICS & GYNECOLOGY | Admitting: OBSTETRICS & GYNECOLOGY
Payer: COMMERCIAL

## 2021-06-07 DIAGNOSIS — Z3A.38 38 WEEKS GESTATION OF PREGNANCY: ICD-10-CM

## 2021-06-07 DIAGNOSIS — Z22.330 GBS CARRIER: ICD-10-CM

## 2021-06-07 DIAGNOSIS — Z3A.40 40 WEEKS GESTATION OF PREGNANCY: ICD-10-CM

## 2021-06-07 DIAGNOSIS — D57.3 SICKLE CELL TRAIT (HCC): ICD-10-CM

## 2021-06-07 LAB
ABO GROUP BLD: NORMAL
BASE EXCESS BLDCOA CALC-SCNC: -2.5 MMOL/L (ref 3–11)
BASE EXCESS BLDCOV CALC-SCNC: -1.9 MMOL/L (ref 1–9)
BLD GP AB SCN SERPL QL: NEGATIVE
ERYTHROCYTE [DISTWIDTH] IN BLOOD BY AUTOMATED COUNT: 18.4 % (ref 11.6–15.1)
HCO3 BLDCOA-SCNC: 25.4 MMOL/L (ref 17.3–27.3)
HCO3 BLDCOV-SCNC: 23.7 MMOL/L (ref 12.2–28.6)
HCT VFR BLD AUTO: 34.8 % (ref 34.8–46.1)
HGB BLD-MCNC: 11.1 G/DL (ref 11.5–15.4)
MCH RBC QN AUTO: 23.4 PG (ref 26.8–34.3)
MCHC RBC AUTO-ENTMCNC: 31.9 G/DL (ref 31.4–37.4)
MCV RBC AUTO: 73 FL (ref 82–98)
O2 CT VFR BLDCOA CALC: 7.3 ML/DL
OXYHGB MFR BLDCOA: 29.8 %
OXYHGB MFR BLDCOV: 58.6 %
PCO2 BLDCOA: 55 MM[HG] (ref 30–60)
PCO2 BLDCOV: 43.6 MM HG (ref 27–43)
PH BLDCOA: 7.28 [PH] (ref 7.23–7.43)
PH BLDCOV: 7.35 [PH] (ref 7.19–7.49)
PLATELET # BLD AUTO: 211 THOUSANDS/UL (ref 149–390)
PMV BLD AUTO: 11.7 FL (ref 8.9–12.7)
PO2 BLDCOA: 16.7 MM HG (ref 5–25)
PO2 BLDCOV: 24.6 MM HG (ref 15–45)
RBC # BLD AUTO: 4.75 MILLION/UL (ref 3.81–5.12)
RH BLD: POSITIVE
SAO2 % BLDCOV: 13.2 ML/DL
SPECIMEN EXPIRATION DATE: NORMAL
WBC # BLD AUTO: 8.37 THOUSAND/UL (ref 4.31–10.16)

## 2021-06-07 PROCEDURE — 86592 SYPHILIS TEST NON-TREP QUAL: CPT | Performed by: OBSTETRICS & GYNECOLOGY

## 2021-06-07 PROCEDURE — 4A1HXCZ MONITORING OF PRODUCTS OF CONCEPTION, CARDIAC RATE, EXTERNAL APPROACH: ICD-10-PCS | Performed by: OBSTETRICS & GYNECOLOGY

## 2021-06-07 PROCEDURE — 82805 BLOOD GASES W/O2 SATURATION: CPT | Performed by: OBSTETRICS & GYNECOLOGY

## 2021-06-07 PROCEDURE — 85027 COMPLETE CBC AUTOMATED: CPT | Performed by: OBSTETRICS & GYNECOLOGY

## 2021-06-07 PROCEDURE — 86900 BLOOD TYPING SEROLOGIC ABO: CPT | Performed by: OBSTETRICS & GYNECOLOGY

## 2021-06-07 PROCEDURE — 99213 OFFICE O/P EST LOW 20 MIN: CPT

## 2021-06-07 PROCEDURE — 86850 RBC ANTIBODY SCREEN: CPT | Performed by: OBSTETRICS & GYNECOLOGY

## 2021-06-07 PROCEDURE — 86901 BLOOD TYPING SEROLOGIC RH(D): CPT | Performed by: OBSTETRICS & GYNECOLOGY

## 2021-06-07 PROCEDURE — 0KQM0ZZ REPAIR PERINEUM MUSCLE, OPEN APPROACH: ICD-10-PCS | Performed by: OBSTETRICS & GYNECOLOGY

## 2021-06-07 RX ORDER — SODIUM CHLORIDE, SODIUM LACTATE, POTASSIUM CHLORIDE, CALCIUM CHLORIDE 600; 310; 30; 20 MG/100ML; MG/100ML; MG/100ML; MG/100ML
125 INJECTION, SOLUTION INTRAVENOUS CONTINUOUS
Status: DISCONTINUED | OUTPATIENT
Start: 2021-06-07 | End: 2021-06-07

## 2021-06-07 RX ORDER — ONDANSETRON 2 MG/ML
4 INJECTION INTRAMUSCULAR; INTRAVENOUS EVERY 6 HOURS PRN
Status: DISCONTINUED | OUTPATIENT
Start: 2021-06-07 | End: 2021-06-07

## 2021-06-07 RX ORDER — DIPHENHYDRAMINE HCL 25 MG
25 TABLET ORAL EVERY 6 HOURS PRN
Status: DISCONTINUED | OUTPATIENT
Start: 2021-06-07 | End: 2021-06-09 | Stop reason: HOSPADM

## 2021-06-07 RX ORDER — HYDROCORTISONE 25 MG/G
CREAM TOPICAL 2 TIMES DAILY
Status: DISCONTINUED | OUTPATIENT
Start: 2021-06-07 | End: 2021-06-09 | Stop reason: HOSPADM

## 2021-06-07 RX ORDER — DOCUSATE SODIUM 100 MG/1
100 CAPSULE, LIQUID FILLED ORAL 2 TIMES DAILY
Status: DISCONTINUED | OUTPATIENT
Start: 2021-06-07 | End: 2021-06-09 | Stop reason: HOSPADM

## 2021-06-07 RX ORDER — IBUPROFEN 600 MG/1
600 TABLET ORAL EVERY 6 HOURS PRN
Status: DISCONTINUED | OUTPATIENT
Start: 2021-06-07 | End: 2021-06-08

## 2021-06-07 RX ORDER — CALCIUM CARBONATE 200(500)MG
1000 TABLET,CHEWABLE ORAL DAILY PRN
Status: DISCONTINUED | OUTPATIENT
Start: 2021-06-07 | End: 2021-06-09 | Stop reason: HOSPADM

## 2021-06-07 RX ORDER — OXYTOCIN/RINGER'S LACTATE 30/500 ML
250 PLASTIC BAG, INJECTION (ML) INTRAVENOUS CONTINUOUS
Status: DISCONTINUED | OUTPATIENT
Start: 2021-06-07 | End: 2021-06-07 | Stop reason: ALTCHOICE

## 2021-06-07 RX ORDER — SIMETHICONE 80 MG
80 TABLET,CHEWABLE ORAL 4 TIMES DAILY PRN
Status: DISCONTINUED | OUTPATIENT
Start: 2021-06-07 | End: 2021-06-09 | Stop reason: HOSPADM

## 2021-06-07 RX ORDER — ACETAMINOPHEN 325 MG/1
650 TABLET ORAL EVERY 6 HOURS PRN
Status: DISCONTINUED | OUTPATIENT
Start: 2021-06-07 | End: 2021-06-09 | Stop reason: HOSPADM

## 2021-06-07 RX ORDER — ONDANSETRON 2 MG/ML
4 INJECTION INTRAMUSCULAR; INTRAVENOUS EVERY 8 HOURS PRN
Status: DISCONTINUED | OUTPATIENT
Start: 2021-06-07 | End: 2021-06-09 | Stop reason: HOSPADM

## 2021-06-07 RX ORDER — POLYETHYLENE GLYCOL 3350 17 G/17G
17 POWDER, FOR SOLUTION ORAL DAILY
Status: DISCONTINUED | OUTPATIENT
Start: 2021-06-07 | End: 2021-06-09 | Stop reason: HOSPADM

## 2021-06-07 RX ADMIN — IBUPROFEN 600 MG: 600 TABLET, FILM COATED ORAL at 20:30

## 2021-06-07 RX ADMIN — SODIUM CHLORIDE, SODIUM LACTATE, POTASSIUM CHLORIDE, AND CALCIUM CHLORIDE 125 ML/HR: .6; .31; .03; .02 INJECTION, SOLUTION INTRAVENOUS at 09:26

## 2021-06-07 RX ADMIN — ROPIVACAINE HYDROCHLORIDE: 5 INJECTION, SOLUTION EPIDURAL; INFILTRATION; PERINEURAL at 11:00

## 2021-06-07 RX ADMIN — SODIUM CHLORIDE 5 MILLION UNITS: 0.9 INJECTION, SOLUTION INTRAVENOUS at 09:38

## 2021-06-07 RX ADMIN — Medication 250 MILLI-UNITS/MIN: at 14:21

## 2021-06-07 RX ADMIN — BENZOCAINE AND LEVOMENTHOL: 200; 5 SPRAY TOPICAL at 16:01

## 2021-06-07 RX ADMIN — WITCH HAZEL 1 PAD: 500 SOLUTION RECTAL; TOPICAL at 16:01

## 2021-06-07 NOTE — DISCHARGE SUMMARY
Discharge Summary - Maday Gilliland 30 y o  female MRN: [id number]    Unit/Bed#: -01 Encounter: [id number]    Admission Date: 2021     Discharge Date: 21      Admitting Diagnosis:   Patient Active Problem List   Diagnosis    Sickle cell trait ([address] )    40 weeks gestation of pregnancy    Constipation during pregnancy    Influenza vaccination declined by patient    Hemorrhoids during pregnancy    GBS carrier     (spontaneous vaginal delivery)       Discharge Diagnosis:   Same, delivered    Procedures:   spontaneous vaginal delivery    Admitting Attending: Dr Llamas [place]ide  Delivery Attending: Dr Faey Juan, *  Discharge Attending: Dr Dae Hutchinson Course:     Maday Gilliland is a 30 y o  Euna Ducking who was admitted at 40w0d for labor with regular contractions, Spontaneous ROM with clear fluid, received epidural for pain control  She was expectantly managed  She delivered a viable male  on 21 at 15: 47  Weight 6lbs 10oz via spontaneous vaginal delivery  Apgars were 9 (1 min) and 9 (5 min)   was transferred to  nursery  Patient tolerated the procedure well and was transferred to recovery in stable condition  Her post-partum course was uncomplicated  Her post-partum pain was well controlled with oral analgesics  The patient's post partum course was unremarkable  On day of discharge, she was ambulating and able to reasonably perform all ADLs  She was voiding and had appropriate bowel function  Pain was well controlled  She was discharged home on postpartum day #2 without complications  Patient was instructed to follow up with her OB as an outpatient and was given appropriate warnings to call doctor or provider if she develops signs of infection or uncontrolled pain  On day of discharge she was ambulating, voiding spontaneously, tolerating oral intake and hemodynamically stable   Mom's blood type is B positive and  Rhogam was not given  Disposition: Home    Planned Readmission: No    Discharge Medications:   Prenatal vitamin daily for 6 months or the duration of nursing, whichever is longer  Motrin 600 mg orally every 6 hours as needed for pain  Tylenol (over the counter) per bottle directions as needed for pain  Hydrocortisone cream 1% (over the counter) applied 1-2x daily to perineum as needed  Witch hazel pads for perineal discomfort as needed    Discharge instructions :   -Do not place anything (no partner, tampons or douche) in your vagina for 6 weeks  -You may walk for exercise for the first 6 weeks then gradually return to your usual activities    -Please do not drive for 1 week if you have no stitches and for 2 weeks if you have stitches or underwent a  delivery     -You may take baths or shower per your preference    -Please look at your bust (breasts) in the mirror daily and call your doctor for redness or tenderness or increased warmth  - If you have had a  section please look at your incision daily as well and call provider for increasing redness or steady drainage from the incision    -Please call your doctor's office if temperature > 100 4*F or 38* C, worsening pain or a foul discharge  Follow Up:  - Follow up in 3 weeks for postpartum visit    Farrah Prado , [facility] Gynecology PGY-1  2021  6:49 AM

## 2021-06-07 NOTE — L&D DELIVERY NOTE
Vaginal Delivery Summary - OB/GYN   Mita Calzada 30 y o  female MRN: [id number]  Unit/Bed#: -01 Encounter: [id number]    Pre-delivery Diagnosis:   Pregnancy at 40 0  Sickle cell trait  Hemorrhoids  GBS positive    Post-delivery Diagnosis: same, delivered    Procedure: Spontaneous Vaginal Delivery     Attending Physician: Dr Miriam Beard  Resident Physician: Dr Harriett Altamirano, Dr Milagros Thao  Other Assistant: Dr Getachew Crockett [facility])    Anesthesia: Epidural    QBL: 585GT    Complications: none apparent    Specimens:   1  Arterial and venous cord gases  2  Cord blood  3  Segment of umbilical cord  4  Placenta to storage     Findings:  1  Viable female on 2021 at , with APGARS of 9 and 9 at 1 and 5 minutes respectively  2  Spontaneous delivery of intact placenta at 1359  3  2 degree laceration repaired with 3-0 Vicryl Rapide x2    Gases:  Umbilical Cord Venous Blood Gas:  Results from last 7 days   Lab Units 21  1355   PH COV  7 354   PCO2 COV mm HG 43 6*   HCO3 COV mmol/L 23 7   BASE EXC COV mmol/L -1 9*   O2 CT CD VB mL/dL 13 2   O2 HGB, VENOUS CORD % 56 6     Umbilical Cord Arterial Blood Gas:  Results from last 7 days   Lab Units 21  1355   PH COA  7 282   PCO2 COA  55 0   PO2 COA mm HG 16 7   HCO3 COA mmol/L 25 4   BASE EXC COA mmol/L -2 5*   O2 CONTENT CORD ART ml/dl 7 3   O2 HGB, ARTERIAL CORD % 29 8       Brief history and labor course:  Arthur Ch is a 30 y o  [id number] 40w0d   She presented to labor and delivery for contractions  Her pregnancy was uncomplicated  On exam in triage she was noted to be 5/50/-2  She was admitted for labor  She was expectantly managed and spontaneously ruptured her membranes  She received an epidural for pain control  She progressed to be complete and started pushing  Description of delivery:    Patient delivered a viable male , wt pending as mother is doing skin to skin bonding   The fetal vertex delivered direct OA position spontaneously  There was no nuchal cord  The left anterior shoulder delivered atraumatically with maternal expulsive forces and the assistance of gentle downward traction  The right posterior shoulder delivered with maternal expulsive forces and the assistance of gentle upward traction  The remainder of the fetus delivered spontaneously  Upon delivery, the infant was placed on the mothers abdomen and the cord was doubly clamped and cut  Delayed cord clamping was achieved  The infant was noted to cry spontaneously and was moving all extremities appropriately  There was no evidence for injury  Awaiting nurse resuscitators evaluated the   Arterial and venous cord blood gases and cord blood was collected for analysis  These were promptly sent to the lab  In the immediate post-partum, active management of the 3rd stage of labor was performed with massage, the administration of 30 units of IV pitocin, and gentle traction on the umbilical cord  The placenta delivered spontaneously and was noted to have a centrally inserted 3 vessel cord  The placenta was sent to storage     Laceration Repair  The vagina, cervix, perineum, and rectum were inspected and there was noted to be a second degree perineal laceration  The patient was comfortable with her epidural for analgesia  The vaginal mucosa and submucosa were then re approximated using 3-0 vicryl rapide to the point of the hymenal ring  Interrupted 3-0 vicryl was used to re approximate the muscle and fascia  The superficial perineal fascia was then re approximated using 3-0 vicryl in a running non locked stitch downward followed by a running subcuticular stitch upward to the level of the introitus  At the conclusion of the procedure, all needle, sponge, and instrument counts were noted to be correct  Dr Jinny Brush was present and participated in all key portions of the case      Disposition:  The patient and the  both tolerated the procedure well and are recovering in labor and delivery room       Angelina Prado 89, DO  OB/GYN PGY-1  6/7/2021  2:19 PM

## 2021-06-07 NOTE — ANESTHESIA PROCEDURE NOTES
CSE Block    Patient location during procedure: OB  Start time: 6/7/2021 10:38 AM  Reason for block: procedure for pain and at surgeon's request  Staffing  Anesthesiologist: Leonie Marquez MD  Performed: anesthesiologist and resident/CRNA   Preanesthetic Checklist  Completed: patient identified, site marked, surgical consent, pre-op evaluation, timeout performed, IV checked, risks and benefits discussed and monitors and equipment checked  CSE  Patient position: sitting  Prep: ChloraPrep  Patient monitoring: heart rate, cardiac monitor, continuous pulse ox and frequent blood pressure checks  Approach: midline  Spinal Needle  Needle type: pencil-tip   Needle gauge: 27 G  Needle length: 10 cm  Epidural Needle  Injection technique: CHOLO air  Needle type: Tuohy   Needle gauge: 18 G  Needle insertion depth: 5 cm  Location: lumbar (1-5)  Catheter  Catheter type: end hole  Catheter size: 18 G  Catheter at skin depth: 9 cm  Assessment  Sensory level: T10  Injection Assessment:  negative aspiration for heme, no pain on injection and no paresthesia on injection

## 2021-06-07 NOTE — DISCHARGE INSTRUCTIONS
par voie vaginale   SOINS AMBULATOIRES :   Ce que vous devez savoir vasyl l par voie vaginale : L par voie vaginale correspond à la naissance de votrudy quiñonesbé en passant par votre vagin (filière pelvigénitale)  Comment se préparer à l par voie vaginale :  · Vous pouvez demander à quelquun de vous accompagner pendant le travail et l  La personne peut être votre conjoint(e), un(e) ami(e) ou un membre de votre famille  Cette personne peut vous aider à vous sentir plus à laise  Faites en sorte de pouvoir contacter la personne au moment où le travail commencera  · Vous devrez peut-être effectuer jayden tests pour [employer] certaines infections mago pourraient être transmises à votre béishmael  Jayden antibiotiques pourront vous être prescrits pour [employer] infection bactérienne ou éviter une infection pendant l  · Demandez sil est possible pour vous de manger pendant le travail  · Votre fournisseur de soins de santé peut vous angel jayden médicaments pour soulager la douleur si vous le souhaitez  Vous aurez peut-être besoin de médicaments pour déclencher (commencer) l, si celui-ci navance pas  Vous aurez peut-être besoin de bouger dans votre lit, de vous lever ou de  pour aider votre bébé à se mettre en place [employer] [employer]  Ce mago se passera au cours de l par voie vaginale :  · Vous pouvez changer de positions pendant l  Vous pouvez vous allonger [place], placer melissa pieds vers le haut dans jayden étriers ou vous accroupir  Vous ressentirez peut-être une pression au niveau du rectum  Keely est provoquée par [employer] de la khalida de hyun acrr dans la filière pelvigénitale  Vous pourrez ressentir un besoin urgent de pousser  Votre fournisseur de soins de santé vous demandera de pousser lorsque vous en ressentirez le besoin  Il ou keely guidera hyun carr hors de la filière pelvigénitale   Jayden forceps ou le recours à laspiration pourront être utilisés afin de faciliter l  Une épisiotomie (incision) sera peut-être également nécessaire pour agrandir lorifice vaginal  Radhames donnera plus despace à votre bébé  · Au moins 1 minute après la naissance de votre bébé, votre fournisseur de soins de santé posera juan carlos clamps vasyl le cord[place]  [place] cordon sera ensuite coupé  Votre utérus continuera à se contracter après l pour [employer]ita placenta  Vous pourrez recevoir juan carlos [facility] afin dempêcher juan carlos saignements abondants lors de lexpulsion du placenta  Votre fournisseur de soins de [employer] refermer lépisiotomie ou les déchirures avec juan carlos sutures  Ce mago se passera après l par voie vaginale :  · Les fournisseurs de soins de santé examineront votre bébé  Votre bébé pourra être placé vasyl votre poitrine immédiatement  Il pourra également commencer à être allaité  Votre bébé recevra également une dose de vitamine K sous forme de piqûre  · Les fournisseurs de soins de santé vous examineront  Votre pression artérielle sera mesurée dès que vous aurez donné naissance  Les fournisseurs vérifieront que vous ne présentez pas de saignements vaginaux et que votre utérus se contracte  Votre température et votre fréquence cardiaque seront mesurées régulièrement  · Vous pourrez passer dans une autre pièce afin de vous reposer avec votre bébé  Appelez un fournisseur de soins de santé si vous tenez votre bébé et commencez à vous sentir fatiguée  Le fournisseur [employer] un berceau près de vous pendant que vous vous reposez ou que vous dormez  Radhames empêchera que vous lâchiez ou fassiez tomber votre bébé accidentellement  · Un fournisseur de soins de santé peut masser votre abdomen plusieurs fois pour raffermir votre utérus  Radhames peut être désagréable   Vous pouvez avoir juan carlos douleurs abdominales jusquà trois jours après votre  parce que votre utérus se contracte encore  Les contractions contribuent à libérer le sang présent dans votre utérus, afin que celui-ci reprenne sa taille habituelle  Prashant contractions peuvent être plus douloureuses lorsque vous allaitez votre bébé  · Votre fournisseur de soins de santé peut vous suggérer de sortir du lit pour vous asseoir vasyl une natty ou pour [employer]  Lactivité peut contribuer à éviter les caillots sanguins  · Vous pourrez rentrer [employer] 24 à 50 heures mago suivent l  Si vous avez besoin daide à la matteo, demandez à votre fournisseur de soins de santé juan carlos renseignements vasyl les visites à domicile par un autre fournisseur de soins de santé  Ce fournisseur de soins de [employer] vous aider à en oir plus vasyl lallaitement, lalimentation au biberon, les soins au bébé et les [employer] périnée  Appelez votre médecin ou votre obstétricien si :  · Votre jambe est chaude, sensible et douloureuse  Il/Tianna peut paraître enflé(e) et dada  · Vous avez de la fièvre  · Vous urinez très peu ou pas du tout  · Vous avez un saignement vaginal anormal  Vous saturez une serviette sanitaire ou plus en une heure  · Vous vous sentez faible, vous êtes hue de vertiges ou vous perdez connaissance  · Votre douleur à labdomen ou au périnée ne satténue pas ou saggrave  · Vous vous sentez déprimée  · Jong Glory juan carlos questions ou juan carlos préoccupations Eddye Ray état de santé ou les soins que [place]  Médicaments :  · Les anti-inflammatoires non stéroïdiens (AINS) , tels que libuprofène, contribuent à réduire la tuméfaction, la douleur et la fièvre  Ce médicament est disponible avec ou sans ordonnance dun médecin  Les AINS peuvent causer une hémorragie digestive ou juan carlos problèmes rénaux chez certaines personnes  Si vous prenez un anticoagulant, demandez toujours à votre fournisseur de soins de santé si les AINS sont sans danger pour vous   Andry notice du médicament et [employer] mode demploi  · Les laxatifs émollients vous aident à aller à la selle  Vous pourriez avoir besoin de ce médicament pour traiter ou prévenir la constipation  · Respectez la dose prescrite  Communiquez avec votre fournisseur de soins de santé si vous pensez que le médicament ne fait pas effet ou si vous avez jayden effets secondaires  Dites-chirag si vous êtes allergique à un médicament  Conservez une [employer] [place], vitamines et médicaments naturels que vous prenez  Indiquez les quantités, le moment et les raisons pour lesquelles vous les prenez  Apportez la liste ou la boîte de pilules lors de melissa visites de Jin  Gardez votre liste de médicaments vasyl vous en alfredo durgence  Activité : Reposez-vous le plus possible  Essayez de réduire la durée de toutes melissa activités  Vous serez peut-être capable de pratiquer [employer] peu de temps après la naissance de votre bébé  Keisha Isles fournisseur de soins de santé lay de commencer à pratiquer [employer]  Si vous travaillez à lextérieur, demandez quand vous pourrez reprendre [employer]   Exercices de Kegel : Les exercices de Kegel peuvent aider les muscles de votre vagin et de votre rectum à guérir plus rapidement  Vous pouvez réaliser les exercices de Kegel en serrant puis en détendant les muscles mago entourent votre vagin  Les exercices de Kegel aident à renforcer les muscles  Soins jayden seins : Lorsque votre lait monte, melissa seins peuvent vous sembler pleins et durs  Demandez comment prendre soin de melissa seins, même si vous nallaitez pas  Constipation : Vous pouvez souffrir de constipation pendant une période de temps après avoir eu votre bébé  Nessayez pas dexpulser melissa selles si cest trop difficile  Jayden aliments riches en fibres et jayden liquides supplémentaires peuvent vous aider à éviter la constipation  Les fruits et le son sont jayden exemples daliments riches en fibres   [place] gl[place] et [employer] juan carlos [employer] à boire  On pourra également vous indiquer de prendre juan carlos fibres ou juan carlos laxatifs émollients en vente bettie  Prenez oumou produits comme prescrit  Demandez comment éviter ou traiter les hémorroïdes  Soins du périnée : Votre périnée est la zone située entre votre vagin et votre anus  La région doit rester propre et sèche  Radhames chirag permettra de cicatriser et de prévenir les infections  Keyur Balding zone délicatement au kevin et à leau pendant votre max ou votre douche  Rincez votre périnée à leau tiède après avoir uriné ou être allée à la selle  Un max de siège chaud peut aider à diminuer la douleur  Pour prendre un max de siège, remplissez [employer] à six pouces deau chaude  Vous pouvez également utiliser une cuvette pour max de siège adaptée à la cuvette juan carlos toilettes  Asseyez-vous dans le max de siège pendant 20 minutes  Effectuez radhames 2 à 3 fois par [employer]  Leau chaude peut aider à diminuer la douleur et la tuméfaction  Pertes vaginales : Après l, vous aurez juan carlos [place], appelées « lochies »  Les lochies sont de couleur rouge ou david foncé et contiennent juan carlos caillots pendant un à trois jours après l  La quantité diminuera et prendra une couleur rodriguez pâle ou david pendant trois à 10 jours  Elles prendront une couleur mitchel ou jaune le dixième ou le quatorzième jour  Verlan Seen une serviette hygiénique plutôt quun tampon pour éviter une infection vaginale  Vous aurez juan carlos lochies pendant trois semaines tout au plus après la naissance de votre bébé  Menstruations : Melissa menstruations peuvent reprendre sept à neuf semaines après la naissance de votre bébé  Si vous allaitez, il faudra peut-être plus longtemps pour que melissa règles réapparaissent  Vous pouvez tomber à nouveau enceinte, même si vous navez pas de menstruations   Parlez à votre fournisseur de soins de santé [facility] de contraception si vous ne souhaitez pas tomber enceinte  Sautes dhumeur : De nombreuses nouvelles mères ont juan carlos sautes dhumeur après l  Certaines de oumou sautes dhumeur sont causées par [employer] de [facility], les changements hormonaux et les soins requis par un nouveau-né  Certaines sautes dhumeur [employer] plus graves  Cest le alfredo de la dépression postpartum  Consultez votre fournisseur de soins de santé si vous vous sentez incapable de prendre soin de votre bébé ou de vous-même  Activité sexuelle : Évitez davoir juan carlos relations sexuelles sans laccord de votre fournisseur de soins de santé  Vous remarquerez peut-être que vous aurez moins envie davoir juan carlos relations sexuelles ou que oumou dernières sont douloureuses  Vous devrez peut-être utiliser un lubrifiant vaginal (gel) pour rendre les relations sexuelles plus confortables  Faites un suivi auprès de votre médecin ou de votre obstétricien en suivant les instructions : La plupart juan carlos femmes doivent revenir six semaines après un  par [employer]  Demandez comment prendre soin de melissa plaies ou melissa points de suture  Écrivez melissa questions à mesure afin de ne pas oublier de les poser lors de melissa visites   Copyright [employer]  Information is for End User's use only and may not be sold, redistributed or otherwise used for commercial purposes  All illustrations and images included in CareNotes® are the copyrighted property of A D A M , Inc  or [facility]  The above information is an  only  It is not intended as medical advice for individual conditions or treatments  Talk to your doctor, nurse or pharmacist before following any medical regimen to see if it is safe and effective for you

## 2021-06-07 NOTE — TELEPHONE ENCOUNTER
Dr Ruiz Sims spoke with patient earlier this morning  She was paged via [employer] to verify if I should send patient to Labor & Delivery per triage disposition  MD was notified of patient's parity, labor progress, ÁNGEL and fetal movement  Dr Ruiz Sims responded that I should send patient to Labor & Delivery

## 2021-06-07 NOTE — TELEPHONE ENCOUNTER
Called the patient who is ashley every 8 minutes at this time  She states that she started ashley irregularly yesterday at 1400  Her contractions are now every 8 minutes  She sounds comfortable on the phone  She was ft/th/h in the office a few days ago  I suggested that the patient wait until her contractions are every 5 minutes for an hour  However, I stated that if she feels at all too uncomfortable and would like to come in, she is more than welcome to do so  She denies any bright red vaginal bleeding, LOF  Reports good FM  Discussed with the patient in [facility]  D/w Dr Francis Stack MD  PGY-1 OB/GYN   6/7/2021 5:40 AM

## 2021-06-07 NOTE — TELEPHONE ENCOUNTER
39 weeks having contractions since this morning around 2 yesterday but now they are every 8m  2nd baby    Reason for Disposition   [1] History of prior delivery (multipara) AND [2] contractions < 10 minutes apart AND [3] present 1 hour    Answer Assessment - Initial Assessment Questions  1  ONSET: "When did the symptoms begin?"         2pm yesterday  2  CONTRACTIONS: "Describe the contractions that you are having " (e g , duration, frequency, regularity, severity)      Coming every 8m  3  ÁNGEL: "What date are you expecting to deliver?"      Today  4  PARITY: "Have you had a baby before?" If yes, "How long did the labor last?"      2nd baby last labor 12-24 hours  5  FETAL MOVEMENT: "Has the baby's movement decreased or changed significantly from normal?"      yes  6   OTHER SYMPTOMS: "Do you have any other symptoms?" (e g , leaking fluid from vagina, vaginal bleeding, fever, hand/facial swelling)      Bloody yesterday evening    Protocols used: PREGNANCY - LABOR-ADULT-

## 2021-06-07 NOTE — ANESTHESIA PREPROCEDURE EVALUATION
Procedure:  LABOR ANALGESIA    Relevant Problems   GYN   (+) 40 weeks gestation of pregnancy      Other   (+) Sickle cell trait (HCC)        Physical Exam    Airway    Mallampati score: II         Dental   No notable dental hx     Cardiovascular      Pulmonary      Other Findings        Anesthesia Plan  ASA Score- 2     Anesthesia Type- epidural with ASA Monitors  Additional Monitors:   Airway Plan:     Comment: I, Dr Denver Person, the attending physician, have personally seen and evaluated the patient prior to anesthetic care  I have reviewed the pre-anesthetic record, and other medical records if appropriate to the anesthetic care  If a CRNA is involved in the case, I have reviewed the CRNA assessment, if present, and agree  The patient is in a suitable condition to proceed with my formulated anesthetic plan          Plan Factors-    Chart reviewed  Induction-     Postoperative Plan-     Informed Consent- Anesthetic plan and risks discussed with patient  I personally reviewed this patient with the CRNA  Discussed and agreed on the Anesthesia Plan with the ADARSH Velasquez

## 2021-06-07 NOTE — H&P
H & P- Obstetrics   Mita Zheng 30 y o  female MRN: [id number]  Unit/Bed#: -01 Encounter: [id number]      Assessment:  30 y o   at 40w0d admitted for labor   EFW: 75%  VTX by manual exam, suture palpated  SVE: Cervical Dilation: 5  Cervical Effacement: 48  Fetal Station: -2  Presentation: Vertex  Method: Manual  OB Examiner: Danielle      Plan:   Admit  Follow up CBC, RPR, Blood Type  Expectant management  GBS positive status:  PCN for prophylaxis   Analgesia and/or epidural at patient request  Anticipate     Patient of: [facility]  This patient will be an INPATIENT  and I certify the anticipated length of stay is >2 Midnights  Discussed with Dr Beckie Gonzales:    Chief Complaint: contractions    HPI: Anisha Hollins is a 30 y o  Threasa Guise with an ÁNGEL of 2021, by Last Menstrual Period at 40w0d who is being admitted for labor   She complains of uterine contractions, occurring every 8 minutes, has no LOF, and reports no VB  She states she has felt good FM  Randolph Manifold Pregnancy complications:     Patient Active Problem List   Diagnosis    Sickle cell trait ([address] )    40 weeks gestation of pregnancy    Constipation during pregnancy    Influenza vaccination declined by patient    Hemorrhoids during pregnancy    GBS carrier       Baby complications/comments: none    Review of Systems   Eyes: Negative  Genitourinary: Negative for vaginal bleeding and vaginal discharge  Vaginal pain: pelvic pain w/ contractions  All other systems reviewed and are negative        OB History    Para Term  AB Living   2 1 1     1   SAB TAB Ectopic Multiple Live Births           1      # Outcome Date GA Lbr [place]/2nd Weight Sex Delivery Anes PTL Lv   2 Current            1 Term 18     Vag-Spont   YO       Past Medical History:   Diagnosis Date    History of kidney stones        Past Surgical History:   Procedure Laterality Date    KIDNEY STONE SURGERY      in [place]        Social History     Tobacco Use    Smoking status: Never Smoker    Smokeless tobacco: Never Used   Substance Use Topics    Alcohol use: Never     Frequency: Never       No Known Allergies    Medications Prior to Admission   Medication    docusate sodium (COLACE) 100 mg capsule    ferrous sulfate 324 (65 Fe) mg    hydrocortisone (ANUSOL-HC) 2 5 % rectal cream    polyethylene glycol (GLYCOLAX) 17 GM/SCOOP powder    Prenatal MV-Min-Fe Fum-FA-DHA (PRENATAL 1 PO)    shark liver oil-cocoa butter (PREPARATION H) 0 25-3-85 5 % suppository    Misc  Devices (Sitz Bath) MISC    polyethylene glycol (MIRALAX) 17 g packet           OBJECTIVE:  Vitals:  Temp:  [98 8 °F (37 1 °C)] 98 8 °F (37 1 °C)  HR:  [80] 80  Resp:  [16] 16  BP: (112)/(81) 112/81  Body mass index is 23 34 kg/m²       Physical Exam:  Constitutional: AAOx3  CV: +S1, +S2, no murmurs appreciated  Resp: No respiratory distress  Abdominal: Gravid uterus  Psychiatric: Behavioral normal    FHT:  Baseline Rate: 130 bpm  Variability: Moderate 6-25 bpm  Accelerations: Periodic  Decelerations: None  FHR Category: Category I    TOCO:   Contraction Frequency (minutes): 5-7  Contraction Duration (seconds): 100      Lab Results   Component Value Date    WBC 7 13 03/13/2021    HGB 10 0 (L) 03/13/2021    HCT 30 7 (L) 03/13/2021     03/13/2021     No results found for: NA, K, CL, CO2, BUN, CREATININE, GLUCOSE, AST, ALT    Prenatal Labs:   Blood Type:   Lab Results   Component Value Date/Time    ABO Grouping B 12/15/2020 11:57 AM     , D (Rh type):   Lab Results   Component Value Date/Time    Rh Factor Positive 12/15/2020 11:57 AM     , HCT/HGB:   Lab Results   Component Value Date/Time    Hematocrit 30 7 (L) 03/13/2021 12:01 PM    Hemoglobin 10 0 (L) 03/13/2021 12:01 PM      , Platelets:   Lab Results   Component Value Date/Time    Platelets 663 11/42/5966 12:01 PM      , 1 hour Glucola:   Lab Results   Component Value Date/Time    Glucose 110 03/13/2021 12:01 PM   , Rubella:   Lab Results   Component Value Date/Time    Rubella IgG Quant >175 0 12/15/2020 11:57 AM        , VDRL/RPR:   Lab Results   Component Value Date/Time    RPR Non-Reactive 03/13/2021 12:01 PM      , Hep B:   Lab Results   Component Value Date/Time    Hepatitis B Surface Ag Non-reactive 12/15/2020 11:57 AM     , HIV:   Lab Results   Component Value Date/Time    HIV-1/HIV-2 Ab Non-Reactive 12/15/2020 11:57 AM     , Group B Strep:    Lab Results   Component Value Date/Time    Strep Grp B PCR Positive (A) 05/10/2021 11:20 AM            Agnes Shabazz DO  OB/GYN PGY-1  6/7/2021  9:13 AM        Portions of the record may have been created with voice recognition software  Occasional wrong word or "sound a like" substitutions may have occurred due to the inherent limitations of voice recognition software    Read the chart carefully and recognize, using context, where substitutions have occurred

## 2021-06-07 NOTE — PLAN OF CARE
Problem: PAIN - ADULT  Goal: Verbalizes/displays adequate comfort level or baseline comfort level  Description: Interventions:  - Encourage patient to monitor pain and request assistance  - Assess pain using appropriate pain scale  - Administer analgesics based on type and severity of pain and evaluate response  - Implement non-pharmacological measures as appropriate and evaluate response  - Consider cultural and social influences on pain and pain management  - Notify physician/advanced practitioner if interventions unsuccessful or patient reports new pain  Outcome: Progressing     Problem: INFECTION - ADULT  Goal: Absence or prevention of progression during hospitalization  Description: INTERVENTIONS:  - Assess and monitor for signs and symptoms of infection  - Monitor lab/diagnostic results  - Monitor all insertion sites, i e  indwelling lines, tubes, and drains  - Monitor endotracheal if appropriate and nasal secretions for changes in amount and color  - [place] appropriate cooling/warming therapies per order  - Administer medications as ordered  - Instruct and encourage patient and family to use good hand hygiene technique  - Identify and instruct in appropriate isolation precautions for identified infection/condition  Outcome: Progressing  Goal: Absence of fever/infection during neutropenic period  Description: INTERVENTIONS:  - Monitor WBC    Outcome: Progressing     Problem: SAFETY ADULT  Goal: Patient will remain free of falls  Description: INTERVENTIONS:  - Assess patient frequently for physical needs  -  Identify cognitive and physical deficits and behaviors that affect risk of falls    -  [place] fall precautions as indicated by assessment   - Educate patient/family on patient safety including physical limitations  - Instruct patient to call for assistance with activity based on assessment  - Modify environment to reduce risk of injury  - Consider OT/PT consult to assist with strengthening/mobility  Outcome: Progressing  Goal: Maintain or return to baseline ADL function  Description: INTERVENTIONS:  -  Assess patient's ability to carry out ADLs; assess patient's baseline for ADL function and identify physical deficits which impact ability to perform ADLs (bathing, care of mouth/teeth, toileting, grooming, dressing, etc )  - Assess/evaluate cause of self-care deficits   - Assess range of motion  - Assess patient's mobility; develop plan if impaired  - Assess patient's need for assistive devices and provide as appropriate  - Encourage maximum independence but intervene and supervise when necessary  - Involve family in performance of ADLs  - Assess for home care needs following discharge   - Consider OT consult to assist with ADL evaluation and planning for discharge  - Provide patient education as appropriate  Outcome: Progressing  Goal: Maintain or return mobility status to optimal level  Description: INTERVENTIONS:  - Assess patient's baseline mobility status (ambulation, transfers, stairs, etc )    - Identify cognitive and physical deficits and behaviors that affect mobility  - Identify mobility aids required to assist with transfers and/or ambulation (gait belt, sit-to-stand, lift, walker, cane, etc )  - [place] fall precautions as indicated by assessment  - Record patient progress and toleration of activity level on Mobility SBAR; progress patient to next Phase/Stage  - Instruct patient to call for assistance with activity based on assessment  - Consider rehabilitation consult to assist with strengthening/weightbearing, etc   Outcome: Progressing     Problem: Knowledge Deficit  Goal: Patient/family/caregiver demonstrates understanding of disease process, treatment plan, medications, and discharge instructions  Description: Complete learning assessment and assess knowledge base    Interventions:  - Provide teaching at level of understanding  - Provide teaching via preferred learning methods  Outcome: Progressing     Problem: DISCHARGE PLANNING  Goal: Discharge to home or other facility with appropriate resources  Description: INTERVENTIONS:  - Identify barriers to discharge w/patient and caregiver  - Arrange for needed discharge resources and transportation as appropriate  - Identify discharge learning needs (meds, wound care, etc )  - Arrange for interpretive services to assist at discharge as needed  - Refer to Case Management Department for coordinating discharge planning if the patient needs post-hospital services based on physician/advanced practitioner order or complex needs related to functional status, cognitive ability, or social support system  Outcome: Progressing

## 2021-06-07 NOTE — TELEPHONE ENCOUNTER
"My wife's contractions are stronger and about 5 minutes apart  She wants to go to the hospital "     Reason for Disposition   [1] History of prior delivery (multipara) AND [2] contractions < 10 minutes apart AND [3] present 1 hour    Answer Assessment - Initial Assessment Questions  1  ONSET: "When did the symptoms begin?"         Started yesterday at 1400    2  CONTRACTIONS: "Describe the contractions that you are having " (e g , duration, frequency, regularity, severity)      Strong contractions about every 5 minutes  3  ÁNGEL: "What date are you expecting to deliver?"      2021  4  PARITY: "Have you had a baby before?" If yes, "How long did the labor last?"       2   5  FETAL MOVEMENT: "Has the baby's movement decreased or changed significantly from normal?"      Moving a little bit  6  OTHER SYMPTOMS: "Do you have any other symptoms?" (e g , leaking fluid from vagina, vaginal bleeding, fever, hand/facial swelling)      Bloody vaginal discharge 3-4 times  Denies ROM  Denies swelling or fever      Protocols used: PREGNANCY - LABOR-ADULT-

## 2021-06-07 NOTE — OB LABOR/OXYTOCIN SAFETY PROGRESS
Labor Progress Note - Mita Calzada 30 y o  female MRN: [id number]    Unit/Bed#: -01 Encounter: [id number]       Contraction Frequency (minutes): 6 5-8  Contraction Quality: Moderate  Tachysystole: No   Cervical Dilation: 6        Cervical Effacement: 50  Fetal Station: -2  Baseline Rate: 140 bpm  Fetal Heart Rate: 140 BPM  FHR Category: Category I               Vital Signs:   Vitals:    06/07/21 1035   BP: 117/76   Pulse: 92   Resp:    Temp:    SpO2:            Notes/comments:    Comfortable with epidural, spontaneously ruptured her membranes  Cervical exam as above, very thick cervix still  Continue expectant management, chief resident Dr Alessandro Granados notified      Martha Sotelo DO 6/7/2021 11:13 AM

## 2021-06-08 LAB — RPR SER QL: NORMAL

## 2021-06-08 PROCEDURE — NC001 PR NO CHARGE: Performed by: OBSTETRICS & GYNECOLOGY

## 2021-06-08 RX ORDER — IBUPROFEN 600 MG/1
600 TABLET ORAL EVERY 6 HOURS PRN
Status: DISCONTINUED | OUTPATIENT
Start: 2021-06-08 | End: 2021-06-09 | Stop reason: HOSPADM

## 2021-06-08 RX ADMIN — Medication 1 TABLET: at 08:52

## 2021-06-08 RX ADMIN — DOCUSATE SODIUM 100 MG: 100 CAPSULE, LIQUID FILLED ORAL at 08:52

## 2021-06-08 RX ADMIN — ACETAMINOPHEN 650 MG: 325 TABLET, FILM COATED ORAL at 01:35

## 2021-06-08 RX ADMIN — IBUPROFEN 600 MG: 600 TABLET, FILM COATED ORAL at 18:44

## 2021-06-08 RX ADMIN — HYDROCORTISONE: 25 CREAM TOPICAL at 18:44

## 2021-06-08 RX ADMIN — IBUPROFEN 600 MG: 600 TABLET, FILM COATED ORAL at 08:52

## 2021-06-08 NOTE — LACTATION NOTE
This note was copied from a baby's chart  CONSULT - LACTATION  Baby Boy (Mita) Zheng 1 days male MRN: [id number]    [facility] NURSERY Room / Bed: (N)/(N) Encounter: [id number]    Maternal Information     MOTHER:  Mita Calzada  Maternal Age: 30 y o    OB History: # 1 - Date: 18, Sex: None, Weight: None, GA: None, Delivery: Vaginal, Spontaneous, Apgar1: None, Apgar5: None, Living: Living, Birth Comments: None    # 2 - Date: 21, Sex: Male, Weight: 3030 g (6 lb 10 9 oz), GA: 40w0d, Delivery: Vaginal, Spontaneous, Apgar1: 9, Apgar5: 9, Living: Living, Birth Comments: None   Previouse breast reduction surgery? No    Lactation history:   Has patient previously breast fed: Yes   How long had patient previously breast fed: 15 mo   Previous breast feeding complications: None     Past Surgical History:   Procedure Laterality Date    KIDNEY STONE SURGERY      in [place]         Birth information:  YOB: 2023   Time of birth: 1:54 PM   Sex: male   Delivery type: Vaginal, Spontaneous   Birth Weight: 3030 g (6 lb 10 9 oz)   Percent of Weight Change: 0%     Gestational Age: 37w0d   [unfilled]    Assessment     Breast and nipple assessment: normal assessment    [place] Assessment: suck issue (disorganized)    Feeding assessment: latch difficulty (resolved with suck training, stretching, oral massage )  LATCH:  Latch: Repeated attempts, hold nipple in mouth, stimulate to suck   Audible Swallowing: Spontaneous and intermittent (24 hours old)   Type of Nipple: Everted (After stimulation)   Comfort (Breast/Nipple): Soft/non-tender   Hold (Positioning): Partial assist, teach one side, mother does other, staff holds   [facility] 8          Feeding recommendations:  breast feed on demand     Met with mother  Provided mother with Ready, Set, Baby booklet  Discussed Skin to Skin contact an benefits to mom and baby    Talked about the delay of the first bath until baby has adjusted  Spoke about the benefits of rooming in  Feeding on cue and what that means for recognizing infant's hunger  Avoidance of pacifiers for the first month discussed  Talked about exclusive breastfeeding for the first 6 months  Positioning and latch reviewed as well as showing images of other feeding positions  Discussed the properties of a good latch in any position  Reviewed hand/manual expression  Discussed s/s that baby is getting enough milk and some s/s that breastfeeding dyad may need further help  Gave information on common concerns, what to expect the first few weeks after delivery, preparing for other caregivers, and how partners can help  Resources for support also provided  Information on hand expression given  Discussed benefits of knowing how to manually express breast including stimulating milk supply, softening nipple for latch and evacuating breast in the event of engorgement  Discussed 2nd night syndrome and ways to calm infant  Hand out given  Worked on positioning infant up at chest level and starting to feed infant with nose arriving at the nipple  Then, using areolar compression to achieve a deep latch that is comfortable and exchanges optimum amounts of milk  Baby gapes but does not latch or suck  Stimulated suck reflex with gloved finger, hyperextension and massaging gumline used to relax jaw  Baby latches with encouragement, skin to skin, and hand expressed colostrum given throughout the attempt  Rhythmic sucking and audible swallows noted       Judy Zazueta RN 6/8/2021 4:01 PM

## 2021-06-08 NOTE — PROGRESS NOTES
Progress Note - OB/GYN   Mita Calzada 30 y o  female MRN: [id number]  Unit/Bed#:  309-01 Encounter: [id number]    Assessment:  PPD#1 s/p Spontaneous Vaginal Delivery, stable    Plan:    1) Postpartum care  Encourage ambulation   Encourage breastfeeding  Continue current meds   2) Contraception   Will follow up with Postpartum visit  3) Disposition   Anticipate discharge home PPD#1 vs PPD#2, pending baby discharge     Subjective/Objective     Subjective:     Pain: no  Tolerating PO: yes  Voiding: yes  Flatus: yes  BM: yes  Ambulating: yes  Breastfeeding: Breast feeding and bottle feeding  Chest pain: no  Shortness of breath: no  Leg pain: no  Lochia: mild    Objective:     Vitals:  Vitals:    06/07/21 1704 06/07/21 2000 06/07/21 2346 06/08/21 0334   BP: 106/71 109/71 95/63 90/55   BP Location: Right arm Right arm Left arm Right arm   Pulse: 65 83 73 70   Resp: 16 16 18 18   Temp: 98 4 °F (36 9 °C) 98 4 °F (36 9 °C) 98 1 °F (36 7 °C) 97 7 °F (36 5 °C)   TempSrc: Oral Oral Oral Oral   SpO2: 99%  100% 98%   Weight:       Height:           Physical Exam:   GEN: appears well, alert and oriented x 3, pleasant and cooperative   CV: +S1, +S2, no murmurs/rubs/gallops appreciated  RESP: no labored breathing  ABDOMEN: soft, no tenderness, no distention, Uterine fundus firm and non-tender, at the umbilicus   EXTREMITIES: non-tender  NEURO Alert and oriented to person, place, and time         Lab Results   Component Value Date    WBC 8 37 06/07/2021    HGB 11 1 (L) 06/07/2021    HCT 34 8 06/07/2021    MCV 73 (L) 06/07/2021     06/07/2021         Bettie Bennett MD, PGY-1  [facility]  06/08/21

## 2021-06-08 NOTE — PLAN OF CARE
Problem: PAIN - ADULT  Goal: Verbalizes/displays adequate comfort level or baseline comfort level  Description: Interventions:  - Encourage patient to monitor pain and request assistance  - Assess pain using appropriate pain scale  - Administer analgesics based on type and severity of pain and evaluate response  - Implement non-pharmacological measures as appropriate and evaluate response  - Consider cultural and social influences on pain and pain management  - Notify physician/advanced practitioner if interventions unsuccessful or patient reports new pain  Outcome: Progressing     Problem: INFECTION - ADULT  Goal: Absence or prevention of progression during hospitalization  Description: INTERVENTIONS:  - Assess and monitor for signs and symptoms of infection  - Monitor lab/diagnostic results  - Monitor all insertion sites, i e  indwelling lines, tubes, and drains  - Monitor endotracheal if appropriate and nasal secretions for changes in amount and color  - [place] appropriate cooling/warming therapies per order  - Administer medications as ordered  - Instruct and encourage patient and family to use good hand hygiene technique  - Identify and instruct in appropriate isolation precautions for identified infection/condition  Outcome: Progressing  Goal: Absence of fever/infection during neutropenic period  Description: INTERVENTIONS:  - Monitor WBC    Outcome: Progressing     Problem: SAFETY ADULT  Goal: Patient will remain free of falls  Description: INTERVENTIONS:  - Assess patient frequently for physical needs  -  Identify cognitive and physical deficits and behaviors that affect risk of falls    -  [place] fall precautions as indicated by assessment   - Educate patient/family on patient safety including physical limitations  - Instruct patient to call for assistance with activity based on assessment  - Modify environment to reduce risk of injury  - Consider OT/PT consult to assist with strengthening/mobility  Outcome: Progressing  Goal: Maintain or return to baseline ADL function  Description: INTERVENTIONS:  -  Assess patient's ability to carry out ADLs; assess patient's baseline for ADL function and identify physical deficits which impact ability to perform ADLs (bathing, care of mouth/teeth, toileting, grooming, dressing, etc )  - Assess/evaluate cause of self-care deficits   - Assess range of motion  - Assess patient's mobility; develop plan if impaired  - Assess patient's need for assistive devices and provide as appropriate  - Encourage maximum independence but intervene and supervise when necessary  - Involve family in performance of ADLs  - Assess for home care needs following discharge   - Consider OT consult to assist with ADL evaluation and planning for discharge  - Provide patient education as appropriate  Outcome: Progressing  Goal: Maintain or return mobility status to optimal level  Description: INTERVENTIONS:  - Assess patient's baseline mobility status (ambulation, transfers, stairs, etc )    - Identify cognitive and physical deficits and behaviors that affect mobility  - Identify mobility aids required to assist with transfers and/or ambulation (gait belt, sit-to-stand, lift, walker, cane, etc )  - [place] fall precautions as indicated by assessment  - Record patient progress and toleration of activity level on Mobility SBAR; progress patient to next Phase/Stage  - Instruct patient to call for assistance with activity based on assessment  - Consider rehabilitation consult to assist with strengthening/weightbearing, etc   Outcome: Progressing     Problem: Knowledge Deficit  Goal: Patient/family/caregiver demonstrates understanding of disease process, treatment plan, medications, and discharge instructions  Description: Complete learning assessment and assess knowledge base    Interventions:  - Provide teaching at level of understanding  - Provide teaching via preferred learning methods  Outcome: Progressing     Problem: DISCHARGE PLANNING  Goal: Discharge to home or other facility with appropriate resources  Description: INTERVENTIONS:  - Identify barriers to discharge w/patient and caregiver  - Arrange for needed discharge resources and transportation as appropriate  - Identify discharge learning needs (meds, wound care, etc )  - Arrange for interpretive services to assist at discharge as needed  - Refer to Case Management Department for coordinating discharge planning if the patient needs post-hospital services based on physician/advanced practitioner order or complex needs related to functional status, cognitive ability, or social support system  Outcome: Progressing     Problem: POSTPARTUM  Goal: Experiences normal postpartum course  Description: INTERVENTIONS:  - Monitor maternal vital signs  - Assess uterine involution and lochia  Outcome: Progressing  Goal: Appropriate maternal -  bonding  Description: INTERVENTIONS:  - Identify family support  - Assess for appropriate maternal/infant bonding   -Encourage maternal/infant bonding opportunities  - Referral to  or  as needed  Outcome: Progressing  Goal: Establishment of infant feeding pattern  Description: INTERVENTIONS:  - Assess breast/bottle feeding  - Refer to lactation as needed  Outcome: Progressing  Goal: Incision(s), wounds(s) or drain site(s) healing without S/S of infection  Description: INTERVENTIONS  - Assess and document risk factors for skin impairment   - Assess and document dressing, incision, wound bed, drain sites and surrounding tissue  - Consider nutrition services referral as needed  - Oral mucous membranes remain intact  - Provide patient/ family education  Outcome: Progressing

## 2021-06-08 NOTE — PLAN OF CARE
Problem: PAIN - ADULT  Goal: Verbalizes/displays adequate comfort level or baseline comfort level  Description: Interventions:  - Encourage patient to monitor pain and request assistance  - Assess pain using appropriate pain scale  - Administer analgesics based on type and severity of pain and evaluate response  - Implement non-pharmacological measures as appropriate and evaluate response  - Consider cultural and social influences on pain and pain management  - Notify physician/advanced practitioner if interventions unsuccessful or patient reports new pain  Outcome: Progressing     Problem: INFECTION - ADULT  Goal: Absence or prevention of progression during hospitalization  Description: INTERVENTIONS:  - Assess and monitor for signs and symptoms of infection  - Monitor lab/diagnostic results  - Monitor all insertion sites, i e  indwelling lines, tubes, and drains  - Monitor endotracheal if appropriate and nasal secretions for changes in amount and color  - [place] appropriate cooling/warming therapies per order  - Administer medications as ordered  - Instruct and encourage patient and family to use good hand hygiene technique  - Identify and instruct in appropriate isolation precautions for identified infection/condition  Outcome: Progressing  Goal: Absence of fever/infection during neutropenic period  Description: INTERVENTIONS:  - Monitor WBC    Outcome: Progressing     Problem: SAFETY ADULT  Goal: Patient will remain free of falls  Description: INTERVENTIONS:  - Assess patient frequently for physical needs  -  Identify cognitive and physical deficits and behaviors that affect risk of falls    -  [place] fall precautions as indicated by assessment   - Educate patient/family on patient safety including physical limitations  - Instruct patient to call for assistance with activity based on assessment  - Modify environment to reduce risk of injury  - Consider OT/PT consult to assist with strengthening/mobility  Outcome: Progressing  Goal: Maintain or return to baseline ADL function  Description: INTERVENTIONS:  -  Assess patient's ability to carry out ADLs; assess patient's baseline for ADL function and identify physical deficits which impact ability to perform ADLs (bathing, care of mouth/teeth, toileting, grooming, dressing, etc )  - Assess/evaluate cause of self-care deficits   - Assess range of motion  - Assess patient's mobility; develop plan if impaired  - Assess patient's need for assistive devices and provide as appropriate  - Encourage maximum independence but intervene and supervise when necessary  - Involve family in performance of ADLs  - Assess for home care needs following discharge   - Consider OT consult to assist with ADL evaluation and planning for discharge  - Provide patient education as appropriate  Outcome: Progressing  Goal: Maintain or return mobility status to optimal level  Description: INTERVENTIONS:  - Assess patient's baseline mobility status (ambulation, transfers, stairs, etc )    - Identify cognitive and physical deficits and behaviors that affect mobility  - Identify mobility aids required to assist with transfers and/or ambulation (gait belt, sit-to-stand, lift, walker, cane, etc )  - [place] fall precautions as indicated by assessment  - Record patient progress and toleration of activity level on Mobility SBAR; progress patient to next Phase/Stage  - Instruct patient to call for assistance with activity based on assessment  - Consider rehabilitation consult to assist with strengthening/weightbearing, etc   Outcome: Progressing     Problem: Knowledge Deficit  Goal: Patient/family/caregiver demonstrates understanding of disease process, treatment plan, medications, and discharge instructions  Description: Complete learning assessment and assess knowledge base    Interventions:  - Provide teaching at level of understanding  - Provide teaching via preferred learning methods  Outcome: Progressing     Problem: DISCHARGE PLANNING  Goal: Discharge to home or other facility with appropriate resources  Description: INTERVENTIONS:  - Identify barriers to discharge w/patient and caregiver  - Arrange for needed discharge resources and transportation as appropriate  - Identify discharge learning needs (meds, wound care, etc )  - Arrange for interpretive services to assist at discharge as needed  - Refer to Case Management Department for coordinating discharge planning if the patient needs post-hospital services based on physician/advanced practitioner order or complex needs related to functional status, cognitive ability, or social support system  Outcome: Progressing     Problem: POSTPARTUM  Goal: Experiences normal postpartum course  Description: INTERVENTIONS:  - Monitor maternal vital signs  - Assess uterine involution and lochia  Outcome: Progressing  Goal: Appropriate maternal -  bonding  Description: INTERVENTIONS:  - Identify family support  - Assess for appropriate maternal/infant bonding   -Encourage maternal/infant bonding opportunities  - Referral to  or  as needed  Outcome: Progressing  Goal: Establishment of infant feeding pattern  Description: INTERVENTIONS:  - Assess breast/bottle feeding  - Refer to lactation as needed  Outcome: Progressing  Goal: Incision(s), wounds(s) or drain site(s) healing without S/S of infection  Description: INTERVENTIONS  - Assess and document risk factors for skin impairment   - Assess and document dressing, incision, wound bed, drain sites and surrounding tissue  - Consider nutrition services referral as needed  - Oral mucous membranes remain intact  - Provide patient/ family education  Outcome: Progressing

## 2021-06-09 VITALS
OXYGEN SATURATION: 100 % | TEMPERATURE: 98 F | WEIGHT: 136 LBS | RESPIRATION RATE: 18 BRPM | HEIGHT: 64 IN | DIASTOLIC BLOOD PRESSURE: 69 MMHG | BODY MASS INDEX: 23.22 KG/M2 | HEART RATE: 64 BPM | SYSTOLIC BLOOD PRESSURE: 104 MMHG

## 2021-06-09 LAB
DME PARACHUTE DELIVERY DATE REQUESTED: NORMAL
DME PARACHUTE ITEM DESCRIPTION: NORMAL
DME PARACHUTE ORDER STATUS: NORMAL
DME PARACHUTE SUPPLIER NAME: NORMAL
DME PARACHUTE SUPPLIER PHONE: NORMAL

## 2021-06-09 RX ORDER — ACETAMINOPHEN 325 MG/1
650 TABLET ORAL EVERY 6 HOURS PRN
Qty: 30 TABLET | Refills: 0 | Status: SHIPPED | OUTPATIENT
Start: 2021-06-09

## 2021-06-09 RX ORDER — IBUPROFEN 600 MG/1
600 TABLET ORAL EVERY 6 HOURS PRN
Qty: 30 TABLET | Refills: 0 | Status: SHIPPED | OUTPATIENT
Start: 2021-06-09 | End: 2021-06-29

## 2021-06-09 RX ADMIN — WITCH HAZEL 1 PAD: 500 SOLUTION RECTAL; TOPICAL at 08:32

## 2021-06-09 RX ADMIN — BENZOCAINE AND LEVOMENTHOL: 200; 5 SPRAY TOPICAL at 09:53

## 2021-06-09 RX ADMIN — IBUPROFEN 600 MG: 600 TABLET, FILM COATED ORAL at 09:53

## 2021-06-09 RX ADMIN — DOCUSATE SODIUM 100 MG: 100 CAPSULE, LIQUID FILLED ORAL at 08:32

## 2021-06-09 RX ADMIN — Medication 1 TABLET: at 08:32

## 2021-06-09 RX ADMIN — HYDROCORTISONE: 25 CREAM TOPICAL at 08:32

## 2021-06-09 RX ADMIN — ACETAMINOPHEN 650 MG: 325 TABLET, FILM COATED ORAL at 08:32

## 2021-06-09 RX ADMIN — POLYETHYLENE GLYCOL 3350 17 G: 17 POWDER, FOR SOLUTION ORAL at 08:32

## 2021-06-09 RX ADMIN — IBUPROFEN 600 MG: 600 TABLET, FILM COATED ORAL at 03:43

## 2021-06-09 NOTE — CASE MANAGEMENT
Liaison attempted to deliver pump and made SW aware that patient had discharged  Requested Jaelynnina to ship pump to patient at home  No additional needs noted

## 2021-06-09 NOTE — PLAN OF CARE
Problem: PAIN - ADULT  Goal: Verbalizes/displays adequate comfort level or baseline comfort level  Description: Interventions:  - Encourage patient to monitor pain and request assistance  - Assess pain using appropriate pain scale  - Administer analgesics based on type and severity of pain and evaluate response  - Implement non-pharmacological measures as appropriate and evaluate response  - Consider cultural and social influences on pain and pain management  - Notify physician/advanced practitioner if interventions unsuccessful or patient reports new pain  6/9/2021 1236 by Marcela Whaley RN  Outcome: Completed  6/9/2021 0842 by Marcela Whaley RN  Outcome: Progressing     Problem: INFECTION - ADULT  Goal: Absence or prevention of progression during hospitalization  Description: INTERVENTIONS:  - Assess and monitor for signs and symptoms of infection  - Monitor lab/diagnostic results  - Monitor all insertion sites, i e  indwelling lines, tubes, and drains  - Monitor endotracheal if appropriate and nasal secretions for changes in amount and color  - [place] appropriate cooling/warming therapies per order  - Administer medications as ordered  - Instruct and encourage patient and family to use good hand hygiene technique  - Identify and instruct in appropriate isolation precautions for identified infection/condition  6/9/2021 1236 by Marcela Whaley RN  Outcome: Completed  6/9/2021 0842 by Marcela Whaley RN  Outcome: Progressing  Goal: Absence of fever/infection during neutropenic period  Description: INTERVENTIONS:  - Monitor WBC    6/9/2021 1236 by Marcela Whaley RN  Outcome: Completed  6/9/2021 0842 by Marcela Whaley RN  Outcome: Progressing     Problem: SAFETY ADULT  Goal: Patient will remain free of falls  Description: INTERVENTIONS:  - Assess patient frequently for physical needs  -  Identify cognitive and physical deficits and behaviors that affect risk of falls    -  [place] fall precautions as indicated by assessment   - Educate patient/family on patient safety including physical limitations  - Instruct patient to call for assistance with activity based on assessment  - Modify environment to reduce risk of injury  - Consider OT/PT consult to assist with strengthening/mobility  6/9/2021 1236 by Jag Byrd RN  Outcome: Completed  6/9/2021 0842 by Jag Byrd RN  Outcome: Progressing  Goal: Maintain or return to baseline ADL function  Description: INTERVENTIONS:  -  Assess patient's ability to carry out ADLs; assess patient's baseline for ADL function and identify physical deficits which impact ability to perform ADLs (bathing, care of mouth/teeth, toileting, grooming, dressing, etc )  - Assess/evaluate cause of self-care deficits   - Assess range of motion  - Assess patient's mobility; develop plan if impaired  - Assess patient's need for assistive devices and provide as appropriate  - Encourage maximum independence but intervene and supervise when necessary  - Involve family in performance of ADLs  - Assess for home care needs following discharge   - Consider OT consult to assist with ADL evaluation and planning for discharge  - Provide patient education as appropriate  6/9/2021 1236 by Jag Byrd RN  Outcome: Completed  6/9/2021 0842 by Jag Byrd RN  Outcome: Progressing  Goal: Maintain or return mobility status to optimal level  Description: INTERVENTIONS:  - Assess patient's baseline mobility status (ambulation, transfers, stairs, etc )    - Identify cognitive and physical deficits and behaviors that affect mobility  - Identify mobility aids required to assist with transfers and/or ambulation (gait belt, sit-to-stand, lift, walker, cane, etc )  - [place] fall precautions as indicated by assessment  - Record patient progress and toleration of activity level on Mobility SBAR; progress patient to next Phase/Stage  - Instruct patient to call for assistance with activity based on assessment  - Consider rehabilitation consult to assist with strengthening/weightbearing, etc   2021 1236 by Neo Schultz RN  Outcome: Completed  2021 08 by Neo Schultz RN  Outcome: Progressing     Problem: Knowledge Deficit  Goal: Patient/family/caregiver demonstrates understanding of disease process, treatment plan, medications, and discharge instructions  Description: Complete learning assessment and assess knowledge base    Interventions:  - Provide teaching at level of understanding  - Provide teaching via preferred learning methods  2021 1236 by Neo Schultz RN  Outcome: Completed  2021 08 by Neo Schultz RN  Outcome: Progressing     Problem: DISCHARGE PLANNING  Goal: Discharge to home or other facility with appropriate resources  Description: INTERVENTIONS:  - Identify barriers to discharge w/patient and caregiver  - Arrange for needed discharge resources and transportation as appropriate  - Identify discharge learning needs (meds, wound care, etc )  - Arrange for interpretive services to assist at discharge as needed  - Refer to Case Management Department for coordinating discharge planning if the patient needs post-hospital services based on physician/advanced practitioner order or complex needs related to functional status, cognitive ability, or social support system  2021 1236 by Neo Schultz RN  Outcome: Completed  2021 08 by Neo Schultz RN  Outcome: Progressing     Problem: POSTPARTUM  Goal: Experiences normal postpartum course  Description: INTERVENTIONS:  - Monitor maternal vital signs  - Assess uterine involution and lochia  2021 1236 by Neo Schultz RN  Outcome: Completed  2021 08 by Neo Schultz RN  Outcome: Progressing  Goal: Appropriate maternal -  bonding  Description: INTERVENTIONS:  - Identify family support  - Assess for appropriate maternal/infant bonding   -Encourage maternal/infant bonding opportunities  - Referral to  or  as needed  6/9/2021 1236 by Tyron Pereira RN  Outcome: Completed  6/9/2021 0842 by Tyron Pereira RN  Outcome: Progressing  Goal: Establishment of infant feeding pattern  Description: INTERVENTIONS:  - Assess breast/bottle feeding  - Refer to lactation as needed  6/9/2021 1236 by Tyron Pereira RN  Outcome: Completed  6/9/2021 0842 by Tyron Pereira RN  Outcome: Progressing  Goal: Incision(s), wounds(s) or drain site(s) healing without S/S of infection  Description: INTERVENTIONS  - Assess and document risk factors for skin impairment   - Assess and document dressing, incision, wound bed, drain sites and surrounding tissue  - Consider nutrition services referral as needed  - Oral mucous membranes remain intact  - Provide patient/ family education  6/9/2021 1236 by Tyron Pereira RN  Outcome: Completed  6/9/2021 0842 by Tyron Pereira RN  Outcome: Progressing

## 2021-06-09 NOTE — LACTATION NOTE
This note was copied from a baby's chart  CONSULT - LACTATION  Baby Boy (Mita) Zheng 2 days male MRN: [id number]    [facility] NURSERY Room / Bed: (N)/(N) Encounter: [id number]    Maternal Information     MOTHER:  Mita Calzada  Maternal Age: 30 y o    OB History: # 1 - Date: 18, Sex: None, Weight: None, GA: None, Delivery: Vaginal, Spontaneous, Apgar1: None, Apgar5: None, Living: Living, Birth Comments: None    # 2 - Date: 21, Sex: Male, Weight: 3030 g (6 lb 10 9 oz), GA: 40w0d, Delivery: Vaginal, Spontaneous, Apgar1: 9, Apgar5: 9, Living: Living, Birth Comments: None   Previouse breast reduction surgery? No    Lactation history:   Has patient previously breast fed: Yes   How long had patient previously breast fed: 15 mo   Previous breast feeding complications: None     Past Surgical History:   Procedure Laterality Date    KIDNEY STONE SURGERY      in [place]         Birth information:  YOB: 2023   Time of birth: 1:54 PM   Sex: male   Delivery type: Vaginal, Spontaneous   Birth Weight: 3030 g (6 lb 10 9 oz)   Percent of Weight Change: -3%     Gestational Age: 37w0d   [unfilled]    Assessment     Breast and nipple assessment: engorged breast    [place] Assessment: normal assessment    Feeding assessment: feeding well  LATCH:  Latch: Grasps breast, tongue down, lips flanged, rhythmic sucking   Audible Swallowing: Spontaneous and intermittent (24 hours old)   Type of Nipple: Everted (After stimulation)   Comfort (Breast/Nipple): Engorged, cracked, bleeding, large blisters or bruises   Hold (Positioning): Partial assist, teach one side, mother does other, staff holds   LATCH Score: 7          Feeding recommendations:  breast feed on demand     Mita has a consult for a medela breast pump for home use  Infant was attempting to feed at the breast, but was swaddled  Father of infant thought the baby was in pain   Encouraged them to put baby to the breast because sucking reduces pain perception in infants  Infant was also demonstrating hunger cues (teaching about hunger cues given) Discussed benefits of aligning infant at the breast for deeper latch  At the end of feeding on the left breast, Mita verbalized the breast was softer  Observed full, tight, leaking breasts  Infant latched well with regular sucking  Printed information on breastfeeding given in french for language barrier  Discussed breast maladies and remedies  Demonstrated use of [employer] translate on family's phone to translate printed information to preferred language for information given that was network specific for breastfeeding follow up as needed  Discharge booklet given  Encouraged family to call for assistance as needed    Daphnie Jo RN 6/9/2021 10:53 AM

## 2021-06-09 NOTE — CASE MANAGEMENT
Breast pump consult; [place] order sent to [facility] for Medela pump per pt request  Request for delivery to room today

## 2021-06-09 NOTE — PLAN OF CARE
Problem: PAIN - ADULT  Goal: Verbalizes/displays adequate comfort level or baseline comfort level  Description: Interventions:  - Encourage patient to monitor pain and request assistance  - Assess pain using appropriate pain scale  - Administer analgesics based on type and severity of pain and evaluate response  - Implement non-pharmacological measures as appropriate and evaluate response  - Consider cultural and social influences on pain and pain management  - Notify physician/advanced practitioner if interventions unsuccessful or patient reports new pain  Outcome: Progressing     Problem: INFECTION - ADULT  Goal: Absence or prevention of progression during hospitalization  Description: INTERVENTIONS:  - Assess and monitor for signs and symptoms of infection  - Monitor lab/diagnostic results  - Monitor all insertion sites, i e  indwelling lines, tubes, and drains  - Monitor endotracheal if appropriate and nasal secretions for changes in amount and color  - [place] appropriate cooling/warming therapies per order  - Administer medications as ordered  - Instruct and encourage patient and family to use good hand hygiene technique  - Identify and instruct in appropriate isolation precautions for identified infection/condition  Outcome: Progressing  Goal: Absence of fever/infection during neutropenic period  Description: INTERVENTIONS:  - Monitor WBC    Outcome: Progressing     Problem: SAFETY ADULT  Goal: Patient will remain free of falls  Description: INTERVENTIONS:  - Assess patient frequently for physical needs  -  Identify cognitive and physical deficits and behaviors that affect risk of falls    -  [place] fall precautions as indicated by assessment   - Educate patient/family on patient safety including physical limitations  - Instruct patient to call for assistance with activity based on assessment  - Modify environment to reduce risk of injury  - Consider OT/PT consult to assist with strengthening/mobility  Outcome: Progressing  Goal: Maintain or return to baseline ADL function  Description: INTERVENTIONS:  -  Assess patient's ability to carry out ADLs; assess patient's baseline for ADL function and identify physical deficits which impact ability to perform ADLs (bathing, care of mouth/teeth, toileting, grooming, dressing, etc )  - Assess/evaluate cause of self-care deficits   - Assess range of motion  - Assess patient's mobility; develop plan if impaired  - Assess patient's need for assistive devices and provide as appropriate  - Encourage maximum independence but intervene and supervise when necessary  - Involve family in performance of ADLs  - Assess for home care needs following discharge   - Consider OT consult to assist with ADL evaluation and planning for discharge  - Provide patient education as appropriate  Outcome: Progressing  Goal: Maintain or return mobility status to optimal level  Description: INTERVENTIONS:  - Assess patient's baseline mobility status (ambulation, transfers, stairs, etc )    - Identify cognitive and physical deficits and behaviors that affect mobility  - Identify mobility aids required to assist with transfers and/or ambulation (gait belt, sit-to-stand, lift, walker, cane, etc )  - [place] fall precautions as indicated by assessment  - Record patient progress and toleration of activity level on Mobility SBAR; progress patient to next Phase/Stage  - Instruct patient to call for assistance with activity based on assessment  - Consider rehabilitation consult to assist with strengthening/weightbearing, etc   Outcome: Progressing     Problem: Knowledge Deficit  Goal: Patient/family/caregiver demonstrates understanding of disease process, treatment plan, medications, and discharge instructions  Description: Complete learning assessment and assess knowledge base    Interventions:  - Provide teaching at level of understanding  - Provide teaching via preferred learning methods  Outcome: Progressing     Problem: DISCHARGE PLANNING  Goal: Discharge to home or other facility with appropriate resources  Description: INTERVENTIONS:  - Identify barriers to discharge w/patient and caregiver  - Arrange for needed discharge resources and transportation as appropriate  - Identify discharge learning needs (meds, wound care, etc )  - Arrange for interpretive services to assist at discharge as needed  - Refer to Case Management Department for coordinating discharge planning if the patient needs post-hospital services based on physician/advanced practitioner order or complex needs related to functional status, cognitive ability, or social support system  Outcome: Progressing     Problem: POSTPARTUM  Goal: Experiences normal postpartum course  Description: INTERVENTIONS:  - Monitor maternal vital signs  - Assess uterine involution and lochia  Outcome: Progressing  Goal: Appropriate maternal -  bonding  Description: INTERVENTIONS:  - Identify family support  - Assess for appropriate maternal/infant bonding   -Encourage maternal/infant bonding opportunities  - Referral to  or  as needed  Outcome: Progressing  Goal: Establishment of infant feeding pattern  Description: INTERVENTIONS:  - Assess breast/bottle feeding  - Refer to lactation as needed  Outcome: Progressing  Goal: Incision(s), wounds(s) or drain site(s) healing without S/S of infection  Description: INTERVENTIONS  - Assess and document risk factors for skin impairment   - Assess and document dressing, incision, wound bed, drain sites and surrounding tissue  - Consider nutrition services referral as needed  - Oral mucous membranes remain intact  - Provide patient/ family education  Outcome: Progressing

## 2021-06-09 NOTE — PLAN OF CARE
Problem: PAIN - ADULT  Goal: Verbalizes/displays adequate comfort level or baseline comfort level  Description: Interventions:  - Encourage patient to monitor pain and request assistance  - Assess pain using appropriate pain scale  - Administer analgesics based on type and severity of pain and evaluate response  - Implement non-pharmacological measures as appropriate and evaluate response  - Consider cultural and social influences on pain and pain management  - Notify physician/advanced practitioner if interventions unsuccessful or patient reports new pain  Outcome: Progressing     Problem: INFECTION - ADULT  Goal: Absence or prevention of progression during hospitalization  Description: INTERVENTIONS:  - Assess and monitor for signs and symptoms of infection  - Monitor lab/diagnostic results  - Monitor all insertion sites, i e  indwelling lines, tubes, and drains  - Monitor endotracheal if appropriate and nasal secretions for changes in amount and color  - [place] appropriate cooling/warming therapies per order  - Administer medications as ordered  - Instruct and encourage patient and family to use good hand hygiene technique  - Identify and instruct in appropriate isolation precautions for identified infection/condition  Outcome: Progressing  Goal: Absence of fever/infection during neutropenic period  Description: INTERVENTIONS:  - Monitor WBC    Outcome: Progressing     Problem: SAFETY ADULT  Goal: Patient will remain free of falls  Description: INTERVENTIONS:  - Assess patient frequently for physical needs  -  Identify cognitive and physical deficits and behaviors that affect risk of falls    -  [place] fall precautions as indicated by assessment   - Educate patient/family on patient safety including physical limitations  - Instruct patient to call for assistance with activity based on assessment  - Modify environment to reduce risk of injury  - Consider OT/PT consult to assist with strengthening/mobility  Outcome: Progressing  Goal: Maintain or return to baseline ADL function  Description: INTERVENTIONS:  -  Assess patient's ability to carry out ADLs; assess patient's baseline for ADL function and identify physical deficits which impact ability to perform ADLs (bathing, care of mouth/teeth, toileting, grooming, dressing, etc )  - Assess/evaluate cause of self-care deficits   - Assess range of motion  - Assess patient's mobility; develop plan if impaired  - Assess patient's need for assistive devices and provide as appropriate  - Encourage maximum independence but intervene and supervise when necessary  - Involve family in performance of ADLs  - Assess for home care needs following discharge   - Consider OT consult to assist with ADL evaluation and planning for discharge  - Provide patient education as appropriate  Outcome: Progressing  Goal: Maintain or return mobility status to optimal level  Description: INTERVENTIONS:  - Assess patient's baseline mobility status (ambulation, transfers, stairs, etc )    - Identify cognitive and physical deficits and behaviors that affect mobility  - Identify mobility aids required to assist with transfers and/or ambulation (gait belt, sit-to-stand, lift, walker, cane, etc )  - [place] fall precautions as indicated by assessment  - Record patient progress and toleration of activity level on Mobility SBAR; progress patient to next Phase/Stage  - Instruct patient to call for assistance with activity based on assessment  - Consider rehabilitation consult to assist with strengthening/weightbearing, etc   Outcome: Progressing     Problem: Knowledge Deficit  Goal: Patient/family/caregiver demonstrates understanding of disease process, treatment plan, medications, and discharge instructions  Description: Complete learning assessment and assess knowledge base    Interventions:  - Provide teaching at level of understanding  - Provide teaching via preferred learning methods  Outcome: Progressing     Problem: DISCHARGE PLANNING  Goal: Discharge to home or other facility with appropriate resources  Description: INTERVENTIONS:  - Identify barriers to discharge w/patient and caregiver  - Arrange for needed discharge resources and transportation as appropriate  - Identify discharge learning needs (meds, wound care, etc )  - Arrange for interpretive services to assist at discharge as needed  - Refer to Case Management Department for coordinating discharge planning if the patient needs post-hospital services based on physician/advanced practitioner order or complex needs related to functional status, cognitive ability, or social support system  Outcome: Progressing     Problem: POSTPARTUM  Goal: Experiences normal postpartum course  Description: INTERVENTIONS:  - Monitor maternal vital signs  - Assess uterine involution and lochia  Outcome: Progressing  Goal: Appropriate maternal -  bonding  Description: INTERVENTIONS:  - Identify family support  - Assess for appropriate maternal/infant bonding   -Encourage maternal/infant bonding opportunities  - Referral to  or  as needed  Outcome: Progressing  Goal: Establishment of infant feeding pattern  Description: INTERVENTIONS:  - Assess breast/bottle feeding  - Refer to lactation as needed  Outcome: Progressing  Goal: Incision(s), wounds(s) or drain site(s) healing without S/S of infection  Description: INTERVENTIONS  - Assess and document risk factors for skin impairment   - Assess and document dressing, incision, wound bed, drain sites and surrounding tissue  - Consider nutrition services referral as needed  - Oral mucous membranes remain intact  - Provide patient/ family education  Outcome: Progressing

## 2021-06-09 NOTE — PROGRESS NOTES
Progress Note - OB/GYN   Mita Calzada 30 y o  female MRN: [id number]  Unit/Bed#: -01 Encounter: [id number]    Assessment:  PPD#2 s/p Spontaneous Vaginal Delivery, stable    Plan:    1) Postpartum care  Encourage ambulation   Encourage breastfeeding  Continue current meds   2) Contraception   Plans for abstinence for 2 years  3) Disposition   Home today, f/u 3 weeks pp    Subjective/Objective     Subjective:     Pain: no  Tolerating PO: yes  Voiding: yes  Flatus: yes  BM: yes  Ambulating: yes  Breastfeeding: Breast feeding and bottle feeding  Chest pain: no  Shortness of breath: no  Leg pain: no  Lochia: mild    Objective:     Vitals:  Vitals:    06/08/21 0819 06/08/21 1232 06/08/21 1545 06/08/21 2334   BP: 111/74 112/67 100/69 111/56   BP Location: Right arm  Right arm Right arm   Pulse: 63 86 78 83   Resp: 18 20 20 18   Temp: 98 3 °F (36 8 °C) 98 4 °F (36 9 °C) 98 3 °F (36 8 °C) 98 1 °F (36 7 °C)   TempSrc: Oral Oral Oral Oral   SpO2:   100%    Weight:       Height:           Physical Exam:   GEN: appears well, alert and oriented x 3, pleasant and cooperative   CV: +S1, +S2, no murmurs/rubs/gallops appreciated  RESP: no labored breathing  ABDOMEN: soft, no tenderness, no distention, Uterine fundus firm and non-tender, at the umbilicus   EXTREMITIES: non-tender  NEURO Alert and oriented to person, place, and time         Lab Results   Component Value Date    WBC 8 37 06/07/2021    HGB 11 1 (L) 06/07/2021    HCT 34 8 06/07/2021    MCV 73 (L) 06/07/2021     06/07/2021         Lori Mccartney DO, PGY-1  [facility]  06/09/21

## 2021-06-10 NOTE — UTILIZATION REVIEW
Inpatient Admission Authorization Request   Notification of Maternity/Delivery &  Birth Information for Admission   SERVICING FACILITY:   [address]  Tax ID: [id number]  NPI: [id number]  Place of Service: [facility]patient [facility]  [address]  Place of Service Code: 24     ATTENDING PROVIDER:  Attending Name and NPI#: Bony Azul [[id number]]  Address: [address]  Phone: [phone]     UTILIZATION REVIEW CONTACT:  Dorothy Bang Utilization   Network Utilization Review Department  Phone: [phone]  Fax [phone]  Email: [email or web address]     PHYSICIAN ADVISORY SERVICES:  FOR LAGS-QD-HUFT REVIEW - MEDICAL NECESSITY DENIAL  Phone: [phone]  Fax: [phone]  Email: [email or web address]     TYPE OF REQUEST:  Inpatient Status     ADMISSION INFORMATION:  ADMISSION DATE/TIME: 21 0856  PATIENT DIAGNOSIS CODE/DESCRIPTION:  40 weeks gestation of pregnancy [Z3A 40]  Encounter for full-term uncomplicated delivery [D28] The primary encounter diagnosis was  (spontaneous vaginal delivery)  Diagnoses of 38 weeks gestation of pregnancy, Sickle cell trait ([address] ), GBS carrier, and 40 weeks gestation of pregnancy were also pertinent to this visit  1   (spontaneous vaginal delivery)    2  38 weeks gestation of pregnancy    3  Sickle cell trait ([address] )    4   GBS carrier    5  40 weeks gestation of pregnancy      DISCHARGE DATE/TIME: 2021  2:00 PM  DISCHARGE DISPOSITION (IF DISCHARGED): Home/Self Care     MOTHER AND  INFORMATION:  Mother: Wes Hirsch 1995   Delivering clinician: [facility]   OB History        2    Para   2    Term   2            AB        Living   2       SAB        TAB        Ectopic        Multiple   0    Live Births   2               [place] Name & MRN:   Information for the patient's :  Steven Diego (Mita) [[id number]]     [place] Delivery Information:  Sex: male  Delivered 2021 1:54 PM by Vaginal, Spontaneous; Gestational Age: 37w0d    [place] Measurements:  Weight: 6 lb 10 9 oz (3030 g); Height: 20"    APGAR 1 minute 5 minutes 10 minutes   Totals: 9 9       Birth Information: 30 y o  female MRN: [id number] Unit/Bed#:  309-01 Estimated Date of Delivery: 21  Birthweight: No birth weight on file  Gestational Age: <None> Delivery Type: Vaginal, Spontaneous          APGARS  One minute Five minutes Ten minutes   Totals:               IMPORTANT INFORMATION:  Please contact the Shanna Shaikh directly with any questions or concerns regarding this request  Department voicemails are confidential     Send requests for admission clinical reviews, concurrent reviews, approvals, and administrative denials due to lack of clinical to fax [phone]

## 2021-06-13 LAB — PLACENTA IN STORAGE: NORMAL

## 2021-06-14 DIAGNOSIS — D64.9 ANEMIA, UNSPECIFIED TYPE: ICD-10-CM

## 2021-06-14 RX ORDER — FERROUS SULFATE TAB EC 324 MG (65 MG FE EQUIVALENT) 324 (65 FE) MG
324 TABLET DELAYED RESPONSE ORAL
Qty: 30 TABLET | Refills: 2 | Status: SHIPPED | OUTPATIENT
Start: 2021-06-14

## 2021-06-14 RX ORDER — DOCUSATE SODIUM 100 MG/1
CAPSULE, LIQUID FILLED ORAL
Qty: 30 CAPSULE | Refills: 2 | Status: SHIPPED | OUTPATIENT
Start: 2021-06-14

## 2021-06-15 ENCOUNTER — TELEPHONE (OUTPATIENT)
Dept: OBGYN CLINIC | Facility: CLINIC | Age: 26
End: 2021-06-15

## 2021-06-15 NOTE — TELEPHONE ENCOUNTER
Called patient to follow up on telephone encounter from this morning  Per patient's , who is translating for Mita, patient took motrin and her fever has come down to 99 3F  Her chills have dissipated and she denies headache, shortness of breath, vision changes, palpitations  Her bilateral lower leg swelling has also improved  She is having vaginal bleeding when breastfeeding or ambulating  Patient states she is hesitant to go the emergency room and prefers to wait at home since she is improving  Although it is encouraging that her symptoms are improving, I still recommended evaluation in the emergency department given fever in the newly postpartum state, which can have many etiologies including mastitis, endometritis, and COVID  Also discussed need to check vitals and verify that she is not becoming preeclamptic, given her increased swelling  Patient and partner aware  They will continue to monitor carefully and head to ED if needed       Susan Edwards MD  OBGYN, PGY-4  6/15/2021  5:21 PM

## 2021-06-15 NOTE — TELEPHONE ENCOUNTER
----- Message from Bo Maynard sent at 6/15/2021 12:38 PM EDT -----  Pt del 6-7    called saying she's  having chills and a fever

## 2021-06-15 NOTE — TELEPHONE ENCOUNTER
Per pt's  fever x 3 days HTemp 104F, current temp 102F administered ibuprofen q6h and pain 9/10 "all over"   reported fever, headache, chills, body aches and loss of taste  Per  no N/V/D, no cough, no blurred vision, no numbness, no tingling, no LOC, no loss of smell and no rash  Pt is eating less, but drinking and UO WNL   informed RN B/L leg swelling, but denied redness or pain 'just heavy"  Pt had a baby boy via  on  at [facility]  Per  minimal bleeding and less since discharged  "She bleeds when breastfeeding, pumping and a little with walking, but they said that was normal"  RN consulted with provider and advised  to take pt to ED immediately for further evaluation and to call back [facility] if follow-up appt is needed   had a verbal understanding and was comfortable with the plan

## 2021-06-16 ENCOUNTER — APPOINTMENT (EMERGENCY)
Dept: ULTRASOUND IMAGING | Facility: HOSPITAL | Age: 26
End: 2021-06-16
Payer: COMMERCIAL

## 2021-06-16 ENCOUNTER — HOSPITAL ENCOUNTER (EMERGENCY)
Facility: HOSPITAL | Age: 26
Discharge: HOME | End: 2021-06-16
Attending: EMERGENCY MEDICINE | Admitting: EMERGENCY MEDICINE
Payer: COMMERCIAL

## 2021-06-16 VITALS
SYSTOLIC BLOOD PRESSURE: 110 MMHG | OXYGEN SATURATION: 98 % | RESPIRATION RATE: 16 BRPM | HEART RATE: 85 BPM | DIASTOLIC BLOOD PRESSURE: 76 MMHG | TEMPERATURE: 98.5 F

## 2021-06-16 DIAGNOSIS — N39.0 UTI (URINARY TRACT INFECTION): Primary | ICD-10-CM

## 2021-06-16 LAB
ALBUMIN SERPL BCP-MCNC: 2.8 G/DL (ref 3.5–5)
ALP SERPL-CCNC: 142 U/L (ref 46–116)
ALT SERPL W P-5'-P-CCNC: 20 U/L (ref 12–78)
ANION GAP SERPL CALCULATED.3IONS-SCNC: 7 MMOL/L (ref 4–13)
ANISOCYTOSIS BLD QL SMEAR: PRESENT
AST SERPL W P-5'-P-CCNC: 17 U/L (ref 5–45)
BACTERIA UR QL AUTO: ABNORMAL /HPF
BASOPHILS # BLD MANUAL: 0 THOUSAND/UL (ref 0–0.1)
BASOPHILS NFR MAR MANUAL: 0 % (ref 0–1)
BILIRUB SERPL-MCNC: 0.24 MG/DL (ref 0.2–1)
BILIRUB UR QL STRIP: NEGATIVE
BUN SERPL-MCNC: 11 MG/DL (ref 5–25)
CALCIUM ALBUM COR SERPL-MCNC: 9.5 MG/DL (ref 8.3–10.1)
CALCIUM SERPL-MCNC: 8.5 MG/DL (ref 8.3–10.1)
CHLORIDE SERPL-SCNC: 109 MMOL/L (ref 100–108)
CLARITY UR: CLEAR
CO2 SERPL-SCNC: 26 MMOL/L (ref 21–32)
COLOR UR: YELLOW
CREAT SERPL-MCNC: 0.7 MG/DL (ref 0.6–1.3)
EOSINOPHIL # BLD MANUAL: 0.32 THOUSAND/UL (ref 0–0.4)
EOSINOPHIL NFR BLD MANUAL: 5 % (ref 0–6)
ERYTHROCYTE [DISTWIDTH] IN BLOOD BY AUTOMATED COUNT: 19 % (ref 11.6–15.1)
GFR SERPL CREATININE-BSD FRML MDRD: 137 ML/MIN/1.73SQ M
GLUCOSE SERPL-MCNC: 89 MG/DL (ref 65–140)
GLUCOSE UR STRIP-MCNC: NEGATIVE MG/DL
HCT VFR BLD AUTO: 33.6 % (ref 34.8–46.1)
HGB BLD-MCNC: 10.9 G/DL (ref 11.5–15.4)
HGB UR QL STRIP.AUTO: ABNORMAL
KETONES UR STRIP-MCNC: ABNORMAL MG/DL
LEUKOCYTE ESTERASE UR QL STRIP: ABNORMAL
LG PLATELETS BLD QL SMEAR: PRESENT
LYMPHOCYTES # BLD AUTO: 1.34 THOUSAND/UL (ref 0.6–4.47)
LYMPHOCYTES # BLD AUTO: 21 % (ref 14–44)
MCH RBC QN AUTO: 24.2 PG (ref 26.8–34.3)
MCHC RBC AUTO-ENTMCNC: 32.4 G/DL (ref 31.4–37.4)
MCV RBC AUTO: 75 FL (ref 82–98)
MONOCYTES # BLD AUTO: 0.32 THOUSAND/UL (ref 0–1.22)
MONOCYTES NFR BLD: 5 % (ref 4–12)
NEUTROPHILS # BLD MANUAL: 4.4 THOUSAND/UL (ref 1.85–7.62)
NEUTS BAND NFR BLD MANUAL: 2 % (ref 0–8)
NEUTS SEG NFR BLD AUTO: 67 % (ref 43–75)
NITRITE UR QL STRIP: NEGATIVE
NON-SQ EPI CELLS URNS QL MICRO: ABNORMAL /HPF
NRBC BLD AUTO-RTO: 0 /100 WBCS
PH UR STRIP.AUTO: 5.5 [PH] (ref 4.5–8)
PLATELET # BLD AUTO: 246 THOUSANDS/UL (ref 149–390)
PLATELET BLD QL SMEAR: ADEQUATE
PMV BLD AUTO: 10.8 FL (ref 8.9–12.7)
POIKILOCYTOSIS BLD QL SMEAR: PRESENT
POTASSIUM SERPL-SCNC: 3.6 MMOL/L (ref 3.5–5.3)
PROT SERPL-MCNC: 7.2 G/DL (ref 6.4–8.2)
PROT UR STRIP-MCNC: ABNORMAL MG/DL
RBC # BLD AUTO: 4.5 MILLION/UL (ref 3.81–5.12)
RBC #/AREA URNS AUTO: ABNORMAL /HPF
SARS-COV-2 RNA RESP QL NAA+PROBE: NEGATIVE
SODIUM SERPL-SCNC: 142 MMOL/L (ref 136–145)
SP GR UR STRIP.AUTO: 1.02 (ref 1–1.03)
TOTAL CELLS COUNTED SPEC: 100
URATE CRY URNS QL MICRO: ABNORMAL /HPF
UROBILINOGEN UR QL STRIP.AUTO: 0.2 E.U./DL
WBC # BLD AUTO: 6.38 THOUSAND/UL (ref 4.31–10.16)
WBC #/AREA URNS AUTO: ABNORMAL /HPF

## 2021-06-16 PROCEDURE — 85007 BL SMEAR W/DIFF WBC COUNT: CPT | Performed by: INTERNAL MEDICINE

## 2021-06-16 PROCEDURE — 81001 URINALYSIS AUTO W/SCOPE: CPT

## 2021-06-16 PROCEDURE — 87086 URINE CULTURE/COLONY COUNT: CPT

## 2021-06-16 PROCEDURE — 76770 US EXAM ABDO BACK WALL COMP: CPT

## 2021-06-16 PROCEDURE — U0005 INFEC AGEN DETEC AMPLI PROBE: HCPCS | Performed by: INTERNAL MEDICINE

## 2021-06-16 PROCEDURE — 80053 COMPREHEN METABOLIC PANEL: CPT | Performed by: INTERNAL MEDICINE

## 2021-06-16 PROCEDURE — 99284 EMERGENCY DEPT VISIT MOD MDM: CPT

## 2021-06-16 PROCEDURE — NC001 PR NO CHARGE: Performed by: OBSTETRICS & GYNECOLOGY

## 2021-06-16 PROCEDURE — 99284 EMERGENCY DEPT VISIT MOD MDM: CPT | Performed by: EMERGENCY MEDICINE

## 2021-06-16 PROCEDURE — 87147 CULTURE TYPE IMMUNOLOGIC: CPT

## 2021-06-16 PROCEDURE — U0003 INFECTIOUS AGENT DETECTION BY NUCLEIC ACID (DNA OR RNA); SEVERE ACUTE RESPIRATORY SYNDROME CORONAVIRUS 2 (SARS-COV-2) (CORONAVIRUS DISEASE [COVID-19]), AMPLIFIED PROBE TECHNIQUE, MAKING USE OF HIGH THROUGHPUT TECHNOLOGIES AS DESCRIBED BY CMS-2020-01-R: HCPCS | Performed by: INTERNAL MEDICINE

## 2021-06-16 PROCEDURE — 36415 COLL VENOUS BLD VENIPUNCTURE: CPT | Performed by: INTERNAL MEDICINE

## 2021-06-16 PROCEDURE — 85027 COMPLETE CBC AUTOMATED: CPT | Performed by: INTERNAL MEDICINE

## 2021-06-16 RX ORDER — ACETAMINOPHEN 325 MG/1
650 TABLET ORAL ONCE
Status: COMPLETED | OUTPATIENT
Start: 2021-06-16 | End: 2021-06-16

## 2021-06-16 RX ORDER — CEPHALEXIN 250 MG/1
500 CAPSULE ORAL ONCE
Status: COMPLETED | OUTPATIENT
Start: 2021-06-16 | End: 2021-06-16

## 2021-06-16 RX ORDER — CEPHALEXIN 500 MG/1
500 CAPSULE ORAL EVERY 6 HOURS SCHEDULED
Qty: 27 CAPSULE | Refills: 0 | Status: SHIPPED | OUTPATIENT
Start: 2021-06-16 | End: 2021-06-23

## 2021-06-16 RX ORDER — IBUPROFEN 600 MG/1
600 TABLET ORAL ONCE
Status: COMPLETED | OUTPATIENT
Start: 2021-06-16 | End: 2021-06-16

## 2021-06-16 RX ADMIN — IBUPROFEN 600 MG: 600 TABLET, FILM COATED ORAL at 18:16

## 2021-06-16 RX ADMIN — CEPHALEXIN 500 MG: 250 CAPSULE ORAL at 19:25

## 2021-06-16 RX ADMIN — ACETAMINOPHEN 650 MG: 325 TABLET, FILM COATED ORAL at 18:16

## 2021-06-16 NOTE — CONSULTS
Mom admitted to ED for pelvic and breast tenderness  Mom's breasts are full, but not red, hot, or very engorged  She has about 6 oz of pumped milk at bedside  Milk storage guidelines reviewed  Parents state Mom is pumping about 4 times per day  Baby was latching initially at home but now becomes fussy at the breast and seems to prefer the bottle  Enc them to [facility] with [facility] for an appt  Enc Mom to pump every 2 hours during the day and every 3 hours at night  Attempt to latch if desired  Practice paced bottle feeding and frequent burping throughout feeds

## 2021-06-16 NOTE — DISCHARGE INSTRUCTIONS
Breastfeeding and Breast Engorgement   WHAT YOU NEED TO KNOW:   Breast engorgement develops when too much milk builds up in your breast  It is normal for your breasts to feel swollen, heavy, and tender when your milk comes in  This is called breast fullness  When your breast starts to feel painful and hard, the fullness has developed into engorgement  Breast engorgement usually happens 3 to 5 days after you give birth  Engorgement can happen if you are not breastfeeding or expressing milk often, or produce a lot of milk  Your baby may have a hard time latching on (attaching) to your breast to feed  Without treatment, engorgement can lead to plugged milk ducts or a breast infection called mastitis  DISCHARGE INSTRUCTIONS:   Seek care immediately if:   · You have a fever with chills or body aches  · You have pain and swelling in one or both breasts that keeps you from breastfeeding  Contact your healthcare provider if:   · You have a tender breast lump that grows slowly and usually forms on one side of your breast     · You have a small, white bump on your nipple  · Your symptoms do not get better within 24 hours  · You have questions or concerns about your condition or care  Manage your symptoms:   · Breastfeed or pump every 2 or 3 hours  Frequent breastfeeding helps decrease engorgement discomfort  Express or pump milk from your breasts before you breastfeed  This will help soften your breast and your nipple, and allow your baby to latch on better  · Massage your breast   Breast massage helps empty your engorged breast and decrease pain  Gently massage your breast before and during breastfeeding to help increase your milk flow  Gently stroke your breast, starting from the outer areas and working your way toward the nipple  Breast massage may also help prevent breast engorgement if done in the first few days after you give birth  · Apply a cool compress in between feedings    The cold may help decrease swelling and pain in your engorged breast  Wet a washcloth in cold water, wring it out, and place it on your breast  Ask how long and how often to use a cool compress  · Wear a supportive bra  The bra should fit well but not be too tight  Prevent breast engorgement:   · Help your baby get a good latch  Hold the nape of his or her neck to help him or her latch onto your breast  Touch his or her top lip with your nipple and wait for him or her to open his or her mouth wide  Your baby's lower lip and chin should touch the areola (dark area around the nipple) first  Help him or her get as much of the areola in his or her mouth as possible  You should feel as if your baby will not separate from your breast easily  Gently break suction and reposition if your baby is only sucking on the nipple  Talk to a lactation consultant if you need help with your baby's latch  · Empty your breasts completely  Take your time when you breastfeed to allow your baby to empty your breast  Try not to switch breasts too early  Express or pump after you breastfeed if your baby is not emptying your breasts when he or she feeds  · Apply warmth to your breast before you breastfeed  Put a warm, wet cloth on your breast or take a warm shower  This can help increase your milk flow  Follow up with your healthcare provider as directed:  Write down your questions so you remember to ask them during your visits  For more information:   · [employer] [address] [facility] , [address]  Phone: [phone]  Web Address: http://www [employer]/    · [address] [place]  Phone: 6- [phone]  Phone: 8- [phone]  Web Address: http://www [facility]/  [facility] [address] Information is for End User's use only and may not be sold, redistributed or otherwise used for commercial purposes   All illustrations and images included in [address] are the copyrighted property of A D A M , Inc  or [facility]   The above information is an  only  It is not intended as medical advice for individual conditions or treatments  Talk to your doctor, nurse or pharmacist before following any medical regimen to see if it is safe and effective for you

## 2021-06-16 NOTE — CONSULTS
Consult - OB/GYN   Mita Calzada 30 y o  female MRN: [id number]  Unit/Bed#:  Encounter: [id number]    HPI:  Mita is a 23yo  PPD#9 from  on 21 who presents with fevers and body aches at home  She reports a Tmax of 103F at home  She denies any specific complaints other than body aches  She is pumping but not currently breastfeeding due to difficult latch  She has been pumping approximately 3 times per day  She denies any redness, erythema, or warmth of the breasts  They are equally sore  She reports minimal bleeding, normal appearing lochia and denies abnormal discharge or odor  She reports some mild burning when she urinates which she attributes to her laceration  She denies any sick contacts and did not receive the COVID vaccine  She has been taking Tylenol and Motrin at home    Patient was assessed with Dr Roblero Friday who speaks fluent [place]  Review of Systems   Constitutional: Positive for chills, fatigue and fever  Respiratory: Negative for shortness of breath  Cardiovascular: Negative for chest pain  Gastrointestinal: Negative for abdominal distention, abdominal pain, constipation, diarrhea, nausea and vomiting  Genitourinary: Positive for dysuria and vaginal bleeding  Negative for difficulty urinating  Musculoskeletal: Positive for back pain  Active Problems:  Patient Active Problem List   Diagnosis    Sickle cell trait ([facility] [place] [facility] )    40 weeks gestation of pregnancy    Constipation during pregnancy    Influenza vaccination declined by patient    Hemorrhoids during pregnancy    GBS carrier     (spontaneous vaginal delivery)     PMH:  Past Medical History:   Diagnosis Date    History of kidney stones      PSH:  Past Surgical History:   Procedure Laterality Date    KIDNEY STONE SURGERY      in [place]      Meds:  No current facility-administered medications on file prior to encounter       Current Outpatient Medications on File Prior to Encounter   Medication Sig Dispense Refill    acetaminophen (TYLENOL) 325 mg tablet Take 2 tablets (650 mg total) by mouth every 6 (six) hours as needed for mild pain, moderate pain, severe pain, headaches or fever 30 tablet 0    docusate sodium (COLACE) 100 mg capsule TAKE 1 CAPSULE BY MOUTH TWICE A DAY 30 capsule 2    ferrous sulfate 324 (65 Fe) mg TAKE 1 TABLET (324 MG TOTAL) BY MOUTH 2 (TWO) TIMES A DAY BEFORE MEALS 30 tablet 2    hydrocortisone (ANUSOL-HC) 2 5 % rectal cream Apply topically 2 (two) times a day 1 Tube 3    ibuprofen (MOTRIN) 600 mg tablet Take 1 tablet (600 mg total) by mouth every 6 (six) hours as needed (cramping) 30 tablet 0    Misc  Devices ([facility]) MISC Use 2 (two) times a day (Patient not taking: Reported on 5/17/2021) 1 each 0    polyethylene glycol (GLYCOLAX) 17 GM/SCOOP powder Take 17 g by mouth daily      Prenatal MV-Min-Fe Fum-FA-DHA (PRENATAL 1 PO) Take 1 tablet by mouth daily      shark liver oil-cocoa butter (PREPARATION H) 0 25-3-85 5 % suppository Insert 1 suppository into the rectum as needed for hemorrhoids 12 suppository 0    witch hazel-glycerin (TUCKS) topical pad Apply 1 pad topically every 4 (four) hours as needed for irritation  0       Allergies:  No Known Allergies    Physical Exam:  /76 (BP Location: Left arm)   Pulse 85   Temp 98 5 °F (36 9 °C) (Oral)   Resp 16   LMP 08/31/2020 (Exact Date)   SpO2 98%     Physical Exam  Constitutional:       General: She is not in acute distress  Appearance: Normal appearance  She is normal weight  She is not ill-appearing, toxic-appearing or diaphoretic  Cardiovascular:      Rate and Rhythm: Normal rate  Pulmonary:      Effort: Pulmonary effort is normal  No respiratory distress  Abdominal:      General: Abdomen is flat  There is no distension  Palpations: Abdomen is soft  There is no mass  Tenderness: There is no abdominal tenderness  There is no guarding     Genitourinary:     Comments: Uterus firm and nontender 3 cm below the umbilicus  Suprapubic tenderness  Musculoskeletal:         General: No swelling or tenderness  Right lower leg: No edema  Left lower leg: No edema  Skin:     General: Skin is warm and dry  Neurological:      Mental Status: She is alert and oriented to person, place, and time  Mental status is at baseline  Psychiatric:         Mood and Affect: Mood normal          Behavior: Behavior normal          Assessment:   30 y o  [id number] PPD#9 from  on 21 with fevers and body aches at home and concern for UTI     Plan:   Patient evaluated for postpartum infectious causes  Her COVID test was negative  Her WBC is wnl without shift and UTI was significant for leukocytes (culture pending)  With ultrasound showing no evidence of hydronephrosis  Her temperature in the ED was 98 5F  She does not show evidence of endometritis or mastitis  Though her breasts are tender, but do not show evidence of infection  Patient was counseled regarding pumping more frequently to decrease fullness and engorgement and ensure adequate milk supply  Patient encouraged to make appointment with [facility] for further instruction  Lactation consulted  Given suprapubic tenderness with leuks on UA, there is concern for UTI  Recommend UTI treatment at this time and follow up outpatient  Discussed with Dr Thomas Ferreira MD  OB/GYN  2021  9:24 PM

## 2021-06-16 NOTE — ED PROVIDER NOTES
History  Chief Complaint   Patient presents with    Fever - 9 weeks to 76 years      reports she gave birth on 6/7 and since then started with fevers and some vaginal pain  29 yo F presents to the emergency room because of high fevers at home  She had spontaneous vaginal delivery on the 7th  Reports that fevers started on the 10th with a Tmax of 103F  Last episode experienced last night  Had generalized abdominal pain, headache and blurry vision, but those symptoms have resolved  Complains of bilateral mastalgia and mentions she has been unable to completely drain her breasts with the pump  She also has been having back pain and dysuria  Denies any diarrhea or constipation  Denies any sick contacts, cough or shortness of breath  History provided by:  Patient and significant other   used: Yes    Fever - 9 weeks to 74 years  Max temp prior to arrival:  103  Onset quality:  Sudden  Duration:  6 days  Progression:  Unchanged  Chronicity:  New  Associated symptoms: chills, dysuria, headaches (resolved) and myalgias    Associated symptoms: no congestion, no cough, no diarrhea, no nausea, no rhinorrhea, no sore throat and no vomiting    Myalgias:     Location:  Generalized    Duration:  6 days    Timing:  Constant      Prior to Admission Medications   Prescriptions Last Dose Informant Patient Reported? Taking? Misc   Devices ([place]) MISC  Spouse/Significant Other No No   Sig: Use 2 (two) times a day   Patient not taking: Reported on 5/17/2021   Prenatal MV-Min-Fe Fum-FA-DHA (PRENATAL 1 PO)  Spouse/Significant Other Yes No   Sig: Take 1 tablet by mouth daily   acetaminophen (TYLENOL) 325 mg tablet   No No   Sig: Take 2 tablets (650 mg total) by mouth every 6 (six) hours as needed for mild pain, moderate pain, severe pain, headaches or fever   docusate sodium (COLACE) 100 mg capsule   No No   Sig: TAKE 1 CAPSULE BY MOUTH TWICE A DAY   ferrous sulfate 324 (65 Fe) mg   No No   Sig: TAKE 1 TABLET (324 MG TOTAL) BY MOUTH 2 (TWO) TIMES A DAY BEFORE MEALS   hydrocortisone (ANUSOL-HC) 2 5 % rectal cream  Spouse/Significant Other No No   Sig: Apply topically 2 (two) times a day   ibuprofen (MOTRIN) 600 mg tablet   No No   Sig: Take 1 tablet (600 mg total) by mouth every 6 (six) hours as needed (cramping)   polyethylene glycol (GLYCOLAX) 17 GM/SCOOP powder  Spouse/Significant Other Yes No   Sig: Take 17 g by mouth daily   shark liver oil-cocoa butter (PREPARATION H) 0 25-3-85 5 % suppository  Spouse/Significant Other No No   Sig: Insert 1 suppository into the rectum as needed for hemorrhoids   witch hazel-glycerin (TUCKS) topical pad   No No   Sig: Apply 1 pad topically every 4 (four) hours as needed for irritation      Facility-Administered Medications: None       Past Medical History:   Diagnosis Date    History of kidney stones        Past Surgical History:   Procedure Laterality Date    KIDNEY STONE SURGERY      in [place]        Family History   Problem Relation Age of Onset    Tuberculosis Mother     Heart attack Father     No Known Problems Sister     No Known Problems Brother     No Known Problems Son     No Known Problems Maternal Grandmother     No Known Problems Maternal Grandfather     No Known Problems Paternal Grandmother     No Known Problems Brother     No Known Problems Brother      I have reviewed and agree with the history as documented  E-Cigarette/Vaping    E-Cigarette Use Never User      E-Cigarette/Vaping Substances    Nicotine No     THC No     CBD No     Flavoring No     Other No     Unknown No      Social History     Tobacco Use    Smoking status: Never Smoker    Smokeless tobacco: Never Used   Vaping Use    Vaping Use: Never used   Substance Use Topics    Alcohol use: Never    Drug use: Never        Review of Systems   Constitutional: Positive for chills and fever  HENT: Negative for congestion, rhinorrhea and sore throat  Eyes: Negative  Respiratory: Negative for cough and shortness of breath  Cardiovascular: Positive for leg swelling (resolving)  Gastrointestinal: Positive for abdominal pain (resolved)  Negative for constipation, diarrhea, nausea and vomiting  Endocrine: Negative  Genitourinary: Positive for dysuria  Negative for vaginal discharge  Musculoskeletal: Positive for back pain and myalgias  Skin: Negative  Allergic/Immunologic: Negative  Neurological: Positive for headaches (resolved)  Hematological: Negative  Psychiatric/Behavioral: Negative  Physical Exam  ED Triage Vitals [06/16/21 1525]   Temperature Pulse Respirations Blood Pressure SpO2   98 5 °F (36 9 °C) 85 16 110/76 98 %      Temp Source Heart Rate Source Patient Position - Orthostatic VS BP Location FiO2 (%)   Oral Monitor Lying Left arm --      Pain Score       6             Orthostatic Vital Signs  Vitals:    06/16/21 1525   BP: 110/76   Pulse: 85   Patient Position - Orthostatic VS: Lying       Physical Exam  Vitals and nursing note reviewed  Constitutional:       General: She is not in acute distress  Appearance: She is well-developed  HENT:      Head: Normocephalic and atraumatic  Eyes:      Conjunctiva/sclera: Conjunctivae normal    Cardiovascular:      Rate and Rhythm: Normal rate and regular rhythm  Heart sounds: No murmur heard  Pulmonary:      Effort: Pulmonary effort is normal  No respiratory distress  Breath sounds: Normal breath sounds  Chest:      Comments: Bilateral engorged/full and painful breasts  Abdominal:      Palpations: Abdomen is soft  Tenderness: There is no abdominal tenderness  There is right CVA tenderness and left CVA tenderness  Musculoskeletal:      Cervical back: Neck supple  Skin:     General: Skin is warm and dry  Neurological:      Mental Status: She is alert           ED Medications  Medications   acetaminophen (TYLENOL) tablet 650 mg (650 mg Oral Given 6/16/21 1816) ibuprofen (MOTRIN) tablet 600 mg (600 mg Oral Given 6/16/21 1816)   cephalexin (KEFLEX) capsule 500 mg (500 mg Oral Given 6/16/21 1925)       Diagnostic Studies  Results Reviewed     Procedure Component Value Units Date/Time    Urine Microscopic [[id number]]  (Abnormal) Collected: 06/16/21 1819    Lab Status: Final result Specimen: Urine, Clean Catch Updated: 06/16/21 1918     RBC, UA 0-1 /hpf      WBC, UA 10-20 /hpf      Epithelial Cells Occasional /hpf      Bacteria, UA Occasional /hpf      Uric Acid Alivia, UA Occasional /hpf     Urine culture [[id number]] Collected: 06/16/21 1819    Lab Status: In process Specimen: Urine, Clean Catch Updated: 06/16/21 1917    Urine Macroscopic, POC [[id number]]  (Abnormal) Collected: 06/16/21 1819    Lab Status: Final result Specimen: Urine Updated: 06/16/21 1821     Color, UA Yellow     Clarity, UA Clear     pH, UA 5 5     Leukocytes, UA Small     Nitrite, UA Negative     Protein, UA 30 (1+) mg/dl      Glucose, UA Negative mg/dl      Ketones, UA Trace mg/dl      Urobilinogen, UA 0 2 E U /dl      Bilirubin, UA Negative     Blood, UA Large     Specific Gravity, UA 1 025    Narrative:      CLINITEK RESULT    Novel Coronavirus Osiel OLIVARES [[id number]]  (Normal) Collected: 06/16/21 1723    Lab Status: Final result Specimen: Nares from Nose Updated: 06/16/21 1820     SARS-CoV-2 Negative    Narrative: The specimen collection materials, transport medium, and/or testing methodology utilized in the production of these test results have been proven to be reliable in a limited validation with an abbreviated program under the Emergency Utilization Authorization provided by the FDA  Testing reported as "Presumptive positive" will be confirmed with secondary testing to ensure result accuracy  Clinical caution and judgement should be used with the interpretation of these results with consideration of the clinical impression and other laboratory testing    Testing reported as "Positive" or "Negative" has been proven to be accurate according to standard laboratory validation requirements  All testing is performed with control materials showing appropriate reactivity at standard intervals  CBC and differential [[id number]]  (Abnormal) Collected: 06/16/21 1723    Lab Status: Final result Specimen: Blood from Arm, Right Updated: 06/16/21 1801     WBC 6 38 Thousand/uL      RBC 4 50 Million/uL      Hemoglobin 10 9 g/dL      Hematocrit 33 6 %      MCV 75 fL      MCH 24 2 pg      MCHC 32 4 g/dL      RDW 19 0 %      MPV 10 8 fL      Platelets 419 Thousands/uL      nRBC 0 /100 WBCs     Narrative: This is an appended report  These results have been appended to a previously verified report      Manual Differential(PHLEBS Do Not Order) [[id number]] Collected: 06/16/21 1723    Lab Status: Final result Specimen: Blood from Arm, Right Updated: 06/16/21 1801     Segmented % 67 %      Bands % 2 %      Lymphocytes % 21 %      Monocytes % 5 %      Eosinophils, % 5 %      Basophils % 0 %      Absolute Neutrophils 4 40 Thousand/uL      Lymphocytes Absolute 1 34 Thousand/uL      Monocytes Absolute 0 32 Thousand/uL      Eosinophils Absolute 0 32 Thousand/uL      Basophils Absolute 0 00 Thousand/uL      Total Counted 100     Anisocytosis Present     Poikilocytes Present     Platelet Estimate Adequate     Large Platelet Present    Comprehensive metabolic panel [[id number]]  (Abnormal) Collected: 06/16/21 1723    Lab Status: Final result Specimen: Blood from Arm, Right Updated: 06/16/21 1746     Sodium 142 mmol/L      Potassium 3 6 mmol/L      Chloride 109 mmol/L      CO2 26 mmol/L      ANION GAP 7 mmol/L      BUN 11 mg/dL      Creatinine 0 70 mg/dL      Glucose 89 mg/dL      Calcium 8 5 mg/dL      Corrected Calcium 9 5 mg/dL      AST 17 U/L      ALT 20 U/L      Alkaline Phosphatase 142 U/L      Total Protein 7 2 g/dL      Albumin 2 8 g/dL      Total Bilirubin 0 24 mg/dL      eGFR 137 ml/min/1 73sq m Narrative:      National Kidney Disease Foundation guidelines for Chronic Kidney Disease (CKD):     Stage 1 with normal or high GFR (GFR > 90 mL/min/1 73 square meters)    Stage 2 Mild CKD (GFR = 60-89 mL/min/1 73 square meters)    Stage 3A Moderate CKD (GFR = 45-59 mL/min/1 73 square meters)    Stage 3B Moderate CKD (GFR = 30-44 mL/min/1 73 square meters)    Stage 4 Severe CKD (GFR = 15-29 mL/min/1 73 square meters)    Stage 5 End Stage CKD (GFR <15 mL/min/1 73 square meters)  Note: GFR calculation is accurate only with a steady state creatinine                 US kidney and bladder   Final Result by Elias Banks MD (06/16 1818)      No evidence of hydronephrosis  Correlate with urinalysis to exclude urinary tract infection  Workstation performed: [id number]               Procedures  Procedures      ED Course     No concerning findings in b/l kidney US  A breat pump was provided to the patient  Lactation was consulted to provide counseling  Will prescribe 7 days of Keflex for the UTI  Will follow up with [facility]  MDM  Number of Diagnoses or Management Options  UTI (urinary tract infection)  Diagnosis management comments: 29 yo F with postpartum fevers for 6 days  CBC, CMP and U/A obtained  Mastitis and UTI in the differential  Breast exam performed which showed b/l breast fullness and tenderness  There we no concerning findings in Kidney US  Patient endorses pumping 3 times a day  Lactation consulted   Patient will be discharged with a total abx course for 7 days       Amount and/or Complexity of Data Reviewed  Clinical lab tests: ordered  Tests in the radiology section of CPT®: ordered        Disposition  Final diagnoses:   UTI (urinary tract infection)     Time reflects when diagnosis was documented in both MDM as applicable and the Disposition within this note     Time User Action Codes Description Comment    6/16/2021  7:06 PM Louis Vizcarra, [address] [facility] [N39 0] UTI (urinary tract infection)       ED Disposition     ED Disposition Condition Date/Time Comment    Discharge Stable Wed Jun 16, 2021  7:06 PM Mita Calzada discharge to home/self care  Follow-up Information     Follow up With Specialties Details Why Contact Info Additional Information    [facility] Schedule an appointment as soon as possible for a visit Please call to make an appointment to discuss breastfeeding and pumping to ensure that the breasts are emptying completely  [address] [facility]  [address] [facility], [address] [place] [address], [address] [place] [address] Emergency Department Emergency Medicine Go to  If symptoms worsen [address] [facility] [id number] [place] [address] Emergency Department, [address], [place], [place], [address]          Patient's Medications   Discharge Prescriptions    CEPHALEXIN (KEFLEX) 500 MG CAPSULE    Take 1 capsule (500 mg total) by mouth every 6 (six) hours for 27 doses       Start Date: 6/16/2021 End Date: 6/23/2021       Order Dose: 500 mg       Quantity: 27 capsule    Refills: 0     No discharge procedures on file  PDMP Review     None           ED Provider  Attending physically available and evaluated Auroraaj Dorado I managed the patient along with the ED Attending      Electronically Signed by         Shirline Schilder, MD  06/16/21 1950

## 2021-06-17 LAB
BACTERIA UR CULT: ABNORMAL
BACTERIA UR CULT: ABNORMAL

## 2021-06-18 NOTE — ED ATTENDING ATTESTATION
2021  I, Kar Andrade MD, saw and evaluated the patient  I have discussed the patient with the resident/non-physician practitioner and agree with the resident's/non-physician practitioner's findings, Plan of Care, and MDM as documented in the resident's/non-physician practitioner's note, except where noted  All available labs and Radiology studies were reviewed  I was present for key portions of any procedure(s) performed by the resident/non-physician practitioner and I was immediately available to provide assistance  At this point I agree with the current assessment done in the Emergency Department  I have conducted an independent evaluation of this patient a history and physical is as follows:    31 yo F s/p   presents to the ED w/ febrile illness  Onset of fever of 103 last Thursday (6 d ago)  She appreciated chills, HA, abdominal & b/l flan pain w/ this  Fever is intermittent & responds to ibuprofen 600mg  Most recent elevation was overnight to 102  Most recent ibuprofen was at approximately 6am  She has not had nasal congestion, sore throat, CP, cough, dyspnea, nausea or vomiting  Appetite has been a bit decreased  No trouble w/ BMs  Urination has triggered a stabbing discomfort in the genitalia X2 days  No urinary frequency or burning sensation  Lochia is light & without odor  Breasts are both quite uncomfortable  She is pumping though breasts remain fairly firm after milk expression  Most recent pumping session was just prior to coming in  No rashes  Delivery was uncomplicated  She received PCN for GBS  Son is doing well  Pt  Had some edema of the legs a couple of days ago though this has improved  She reports only slight ankle fullness/ soreness  No known sick contacts  PMHx includes SS trait  Chart noted hx of kidney stone surgery  In speaking w/ pt  &  it is learned that she had 1 event requiring surgery to remove a small portion of intestine   believes this was her appendix  He states another family member had translated & used the term kidney stone at the time  No other hx of kidney problems  On exam pt  Is alert & afebrile w/ nl VS   MMM  Heart sounds regular  Lungs CTA  Abdomen is soft & NT   +B/L CVAT  LE NT  Trace B/L ankle edema  Strong PT & DP pulses  Breasts both engorged & tender especially lower quadrants  No distinct erythema  Possible slight increased warmth  Concerns greatest for UTI/ pyelo +/- mastitis  Exam not suggestive of DVT, pna, endometritis  Studies ordered  Pump brought to bedside  Pt  Familiar with this from hospital stay  OB & lactation consultant consulted  ED Course      ED Course as of Jun 17 2201   Wed Jun 16, 2021   [address] test is negative  Blood work unremarkable      No e/o obstruction on US  Dr Fredis Wilson evaluated pt  UTI felt to be source of infection  She reviewed several aspects of postpartum care w/ pt  Including Increased hydration & nutrition as well as more frequent pumping  Referral made to [facility]  Lactation consultant additionally evaluated pt  In ED  Cephalexin initiated       Critical Care Time  Procedures

## 2021-06-29 ENCOUNTER — POSTPARTUM VISIT (OUTPATIENT)
Dept: OBGYN CLINIC | Facility: CLINIC | Age: 26
End: 2021-06-29

## 2021-06-29 VITALS
HEIGHT: 64 IN | HEART RATE: 73 BPM | SYSTOLIC BLOOD PRESSURE: 116 MMHG | WEIGHT: 123 LBS | BODY MASS INDEX: 21 KG/M2 | DIASTOLIC BLOOD PRESSURE: 78 MMHG

## 2021-06-29 PROBLEM — Z3A.40 40 WEEKS GESTATION OF PREGNANCY: Status: RESOLVED | Noted: 2021-01-19 | Resolved: 2021-06-29

## 2021-06-29 PROBLEM — O22.40 HEMORRHOIDS DURING PREGNANCY: Status: RESOLVED | Noted: 2021-04-26 | Resolved: 2021-06-29

## 2021-06-29 PROBLEM — K59.00 CONSTIPATION DURING PREGNANCY: Status: RESOLVED | Noted: 2021-02-16 | Resolved: 2021-06-29

## 2021-06-29 PROBLEM — O99.619 CONSTIPATION DURING PREGNANCY: Status: RESOLVED | Noted: 2021-02-16 | Resolved: 2021-06-29

## 2021-06-29 PROBLEM — Z22.330 GBS CARRIER: Status: RESOLVED | Noted: 2021-05-12 | Resolved: 2021-06-29

## 2021-06-29 PROBLEM — Z28.21 INFLUENZA VACCINATION DECLINED BY PATIENT: Status: RESOLVED | Noted: 2021-02-16 | Resolved: 2021-06-29

## 2021-06-29 PROCEDURE — 99213 OFFICE O/P EST LOW 20 MIN: CPT | Performed by: OBSTETRICS & GYNECOLOGY

## 2021-06-29 PROCEDURE — T1015 CLINIC SERVICE: HCPCS | Performed by: OBSTETRICS & GYNECOLOGY

## 2021-06-29 NOTE — PROGRESS NOTES
Assessment/Plan     Normal postpartum exam      1  Contraception: undecided, leaning towards nexplanon  Will call to schedule appt for insertion  2  Annual exam due in November  Pap due 2023  3  Increase activity as tolerated, may resume all normal activity  Subjective     Maday Gilliland is a 30 y o  female who presents for a postpartum visit  She primarily speaks [place], translation is assisted by the help of her  at bedside  She is 3 weeks postpartum following a spontaneous vaginal delivery  I have fully reviewed the prenatal and intrapartum course  The delivery was at 40 gestational weeks  Anesthesia: epidural  Laceration: 2nd degree  The patient was seen in the ED on 6/16 with mastitis  Has greatly improved, no further breast problems  Bleeding staining only  Bowel function is normal  Bladder function is normal  Patient has not been sexually active  Desired contraception method is undecided  Postpartum depression screening: negative  EPDS : 0    Baby's course has been uncomplicated  Baby is feeding by breast and formula      Last Pap : 2020 ; no abnormalities  Gestational Diabetes: no  Pregnancy Complications: GBS positive    The following portions of the patient's history were reviewed and updated as appropriate: allergies, current medications, past family history, past medical history, past social history, past surgical history and problem list       Current Outpatient Medications:     acetaminophen (TYLENOL) 325 mg tablet, Take 2 tablets (650 mg total) by mouth every 6 (six) hours as needed for mild pain, moderate pain, severe pain, headaches or fever, Disp: 30 tablet, Rfl: 0    docusate sodium (COLACE) 100 mg capsule, TAKE 1 CAPSULE BY MOUTH TWICE A DAY, Disp: 30 capsule, Rfl: 2    ferrous sulfate 324 (65 Fe) mg, TAKE 1 TABLET (324 MG TOTAL) BY MOUTH 2 (TWO) TIMES A DAY BEFORE MEALS, Disp: 30 tablet, Rfl: 2    hydrocortisone (ANUSOL-HC) 2 5 % rectal cream, Apply topically 2 (two) times a day, Disp: 1 Tube, Rfl: 3    Misc  Devices (Sitz Bath) MISC, Use 2 (two) times a day, Disp: 1 each, Rfl: 0    ibuprofen (MOTRIN) 600 mg tablet, Take 1 tablet (600 mg total) by mouth every 6 (six) hours as needed (cramping) (Patient not taking: Reported on 2021), Disp: 30 tablet, Rfl: 0    polyethylene glycol (GLYCOLAX) 17 GM/SCOOP powder, Take 17 g by mouth daily (Patient not taking: Reported on 2021), Disp: , Rfl:     Prenatal MV-Min-Fe Fum-FA-DHA (PRENATAL 1 PO), Take 1 tablet by mouth daily (Patient not taking: Reported on 2021), Disp: , Rfl:     shark liver oil-cocoa butter (PREPARATION H) 0 25-3-85 5 % suppository, Insert 1 suppository into the rectum as needed for hemorrhoids (Patient not taking: Reported on 2021), Disp: 12 suppository, Rfl: 0    witch hazel-glycerin (TUCKS) topical pad, Apply 1 pad topically every 4 (four) hours as needed for irritation (Patient not taking: Reported on 2021), Disp:  , Rfl: 0    No Known Allergies    Review of Systems  Constitutional: no fever, feels well  Breasts: no complaints of breast pain, breast lump, or nipple discharge  Gastrointestinal: no complaints nausea, vomiting  Genitourinary: as noted in HPI    Neurological: no complaints of headache      Objective      /78   Pulse 73   Ht 5' 4" (1 626 m)   Wt 55 8 kg (123 lb)   LMP 2020 (Exact Date)   Breastfeeding Yes Comment:  ()Bottle feeding as well  BMI 21 11 kg/m²     OBGyn Exam  General: alert and oriented, in no acute distress  Vulva: normal, Bartholin's, Urethra, [place]'s normal, perineum well healed  Vagina: normal mucosa

## 2021-07-07 ENCOUNTER — OFFICE VISIT (OUTPATIENT)
Dept: FAMILY MEDICINE CLINIC | Facility: CLINIC | Age: 26
End: 2021-07-07
Payer: COMMERCIAL

## 2021-07-07 VITALS
BODY MASS INDEX: 21.34 KG/M2 | HEART RATE: 70 BPM | WEIGHT: 125 LBS | SYSTOLIC BLOOD PRESSURE: 100 MMHG | DIASTOLIC BLOOD PRESSURE: 62 MMHG | RESPIRATION RATE: 16 BRPM | OXYGEN SATURATION: 99 % | HEIGHT: 64 IN | TEMPERATURE: 98 F

## 2021-07-07 DIAGNOSIS — K64.9 HEMORRHOIDS, UNSPECIFIED HEMORRHOID TYPE: Primary | ICD-10-CM

## 2021-07-07 PROBLEM — N20.0 KIDNEY STONES: Status: ACTIVE | Noted: 2021-07-07

## 2021-07-07 PROCEDURE — 1036F TOBACCO NON-USER: CPT | Performed by: FAMILY MEDICINE

## 2021-07-07 PROCEDURE — 3725F SCREEN DEPRESSION PERFORMED: CPT | Performed by: FAMILY MEDICINE

## 2021-07-07 PROCEDURE — 99203 OFFICE O/P NEW LOW 30 MIN: CPT | Performed by: FAMILY MEDICINE

## 2021-07-07 NOTE — PATIENT INSTRUCTIONS
Decrease the iron to once a day to help with constipation  Continue colace 100 mg twice a day  Drink plenty of water through the day  Continue the hemorrhoid suppository as needed    Schedule the colorectal appointment

## 2021-07-07 NOTE — PROGRESS NOTES
Assessment/Plan:       Problem List Items Addressed This Visit     None      Visit Diagnoses     Hemorrhoids, unspecified hemorrhoid type    -  Primary    Relevant Orders    Ambulatory referral to Colorectal Surgery           Patient Instructions   Decrease the iron to once a day to help with constipation  Continue colace 100 mg twice a day  Drink plenty of water through the day  Continue the hemorrhoid suppository as needed  Schedule the colorectal appointment            Subjective:     Mita is a 30 y o  female here today with chief complaint below:  Chief Complaint   Patient presents with    [facility]    Hemorrhoids     - CC above per clinical staff and reviewed  HPI:  Hemorrhoids during pregnancy which are persisting  Concerned these are not improving  No bleeding  Yesterday had pain, today not as painful  Constipation  Taking colace twice a day  Taking iron twice a day also  She is breastfeeding  Has hydrocortisone rectal cream at home  The following portions of the patient's history were reviewed and updated as appropriate: allergies, current medications, past family history, past medical history, past social history, past surgical history and problem list     ROS:  Review of Systems   No fever, chills, congestion, chest pain, shortness of breath, nausea, vomiting, diarrhea, constipation, blood in stool, urinary concerns, mood changes  Rest of ROS neg except as above  Objective:      /62   Pulse 70   Temp 98 °F (36 7 °C)   Resp 16   Ht 5' 4" (1 626 m)   Wt 56 7 kg (125 lb)   SpO2 99%   BMI 21 46 kg/m²   BP Readings from Last 3 Encounters:   07/07/21 100/62   06/29/21 116/78   06/16/21 110/76     Wt Readings from Last 3 Encounters:   07/07/21 56 7 kg (125 lb)   06/29/21 55 8 kg (123 lb)   06/07/21 61 7 kg (136 lb)               Physical Exam:   Physical Exam  Vitals and nursing note reviewed  Constitutional:       Appearance: Normal appearance   She is not ill-appearing  HENT:      Head: Normocephalic  Cardiovascular:      Rate and Rhythm: Normal rate and regular rhythm  Heart sounds: No murmur heard  Pulmonary:      Effort: Pulmonary effort is normal       Breath sounds: Normal breath sounds  No wheezing, rhonchi or rales  Abdominal:      Tenderness: There is no abdominal tenderness  Genitourinary:     Comments: Small visible hemorroid, mildly indurated, mildly tender on exam    No blood  Musculoskeletal:      Right lower leg: No edema  Left lower leg: No edema  Lymphadenopathy:      Cervical: No cervical adenopathy  Neurological:      Mental Status: She is alert and oriented to person, place, and time     Psychiatric:         Behavior: Behavior normal

## 2021-07-16 ENCOUNTER — PROCEDURE VISIT (OUTPATIENT)
Dept: OBGYN CLINIC | Facility: CLINIC | Age: 26
End: 2021-07-16

## 2021-07-16 VITALS
HEART RATE: 64 BPM | SYSTOLIC BLOOD PRESSURE: 110 MMHG | BODY MASS INDEX: 21.34 KG/M2 | WEIGHT: 125 LBS | HEIGHT: 64 IN | DIASTOLIC BLOOD PRESSURE: 72 MMHG

## 2021-07-16 DIAGNOSIS — Z30.018 ENCOUNTER FOR INITIAL PRESCRIPTION OF OTHER CONTRACEPTIVES: ICD-10-CM

## 2021-07-16 DIAGNOSIS — Z30.430 ENCOUNTER FOR INSERTION OF INTRAUTERINE CONTRACEPTIVE DEVICE (IUD): Primary | ICD-10-CM

## 2021-07-16 PROCEDURE — 3008F BODY MASS INDEX DOCD: CPT | Performed by: FAMILY MEDICINE

## 2021-07-16 PROCEDURE — T1015 CLINIC SERVICE: HCPCS | Performed by: OBSTETRICS & GYNECOLOGY

## 2021-07-16 PROCEDURE — 58300 INSERT INTRAUTERINE DEVICE: CPT | Performed by: OBSTETRICS & GYNECOLOGY

## 2021-07-16 PROCEDURE — 58300 INSERT INTRAUTERINE DEVICE: CPT

## 2021-07-16 NOTE — PROGRESS NOTES
Iud insertions    Date/Time: 7/16/2021 4:45 PM  Performed by: Marv Michele MD  Authorized by: Marv Michele MD   Universal Protocol:  Procedure performed by: Marv Michele MD and Aminata Bravo MD)  Consent: Verbal consent obtained  Written consent obtained    Risks and benefits: risks, benefits and alternatives were discussed  Consent given by: patient  Patient understanding: patient states understanding of the procedure being performed  Patient consent: the patient's understanding of the procedure matches consent given  Procedure consent: procedure consent matches procedure scheduled  Relevant documents: relevant documents present and verified  Test results: test results available and properly labeled  Required items: required blood products, implants, devices, and special equipment available  Patient identity confirmed: verbally with patient        Procedure:     Pelvic exam performed: yes      Negative GC/chlamydia test: yes      Negative urine pregnancy test: yes      Cervix cleaned and prepped: yes      Speculum placed in vagina: yes      Tenaculum applied to cervix: yes      Uterus sounded: yes      Uterus sound depth (cm):  7    IUD inserted with no complications: yes      IUD type:  ParaGard    Strings trimmed: yes    Post-procedure:     Patient tolerated procedure well: yes      Patient will follow up after next period: yes

## 2021-07-16 NOTE — PROGRESS NOTES
OB/GYN VISIT  Mita Calzada  2021  4:47 PM      Assessment/Plan:    Maria Esther Chow is a 30 y o   female presents for insertion of Paragard IUD  Discussed risks vs benefits of Paragard as well as insertion prior to procedure  - Paragard inserted without difficulty  - Negative UPT  - Negative GC/C    RTC in 2-4 weeks for string check    D/w Dr Malik Batch    Subjective:     Maria Esther Chow is a 30 y o  [id number] female who presents for Paragard IUD insertion  Patient has been counseled at prior visits on various forms of contraception and desires Paragard insertion  She presents with her  who translates for her as patient is primarily [place] speaking  Objective:    Vitals: Blood pressure 110/72, pulse 64, height 5' 4" (1 626 m), weight 56 7 kg (125 lb), currently breastfeeding  Body mass index is 21 46 kg/m²  Past Medical History:   Diagnosis Date    History of kidney stones      Past Surgical History:   Procedure Laterality Date    KIDNEY STONE SURGERY      in [place]        Physical Exam  Vitals reviewed  Exam conducted with a chaperone present  Constitutional:       General: She is not in acute distress  Appearance: Normal appearance  She is well-developed  Cardiovascular:      Rate and Rhythm: Normal rate  Pulmonary:      Effort: Pulmonary effort is normal  No respiratory distress  Abdominal:      Palpations: Abdomen is soft  Tenderness: There is no abdominal tenderness  Genitourinary:     General: Normal vulva  Vagina: No vaginal discharge, erythema, tenderness or bleeding  Cervix: No cervical motion tenderness, discharge, friability, lesion or erythema  Uterus: Normal  Not enlarged  Skin:     General: Skin is warm and dry  Neurological:      Mental Status: She is alert and oriented to person, place, and time     Psychiatric:         Behavior: Behavior normal            Sterling Bonds MD  2021  4:47 PM

## 2021-07-19 LAB — SL AMB POCT URINE HCG: NORMAL

## 2021-07-19 RX ORDER — COPPER 313.4 MG/1
1 INTRAUTERINE DEVICE INTRAUTERINE ONCE
Status: COMPLETED | OUTPATIENT
Start: 2021-07-19 | End: 2021-07-19

## 2021-07-19 RX ADMIN — COPPER 1 INTRA UTERINE DEVICE: 313.4 INTRAUTERINE DEVICE INTRAUTERINE at 09:35

## 2021-07-30 ENCOUNTER — OFFICE VISIT (OUTPATIENT)
Dept: OBGYN CLINIC | Facility: CLINIC | Age: 26
End: 2021-07-30

## 2021-07-30 VITALS
DIASTOLIC BLOOD PRESSURE: 68 MMHG | HEART RATE: 53 BPM | SYSTOLIC BLOOD PRESSURE: 112 MMHG | HEIGHT: 64 IN | BODY MASS INDEX: 21.34 KG/M2 | WEIGHT: 125 LBS

## 2021-07-30 DIAGNOSIS — Z97.5 IUD (INTRAUTERINE DEVICE) IN PLACE: Primary | ICD-10-CM

## 2021-07-30 PROCEDURE — 3008F BODY MASS INDEX DOCD: CPT | Performed by: FAMILY MEDICINE

## 2021-07-30 PROCEDURE — 99212 OFFICE O/P EST SF 10 MIN: CPT | Performed by: OBSTETRICS & GYNECOLOGY

## 2021-07-30 NOTE — ASSESSMENT & PLAN NOTE
Paragard IUD strings visualized   No exposure of IUD outside of cervix  No complaints    F/u in 6 months for annual exam

## 2021-07-30 NOTE — PROGRESS NOTES
OB/GYN VISIT  Mita Calzada  7/30/2021  1:41 PM      Assessment/Plan:  Problem List Items Addressed This Visit        Other    IUD (intrauterine device) in place - Primary     Paragard IUD strings visualized   No exposure of IUD outside of cervix  No complaints    F/u in 6 months             D/w Dr Mateus Briones    Subjective:     Faisal Duong is a 30 y o  [id number] female who presents for Paragard string check  Patient had ParaGard IUD placed in the office 2 weeks ago  She reports some spotting, but no pain  She is having regular bladder/bowel function  Objective:    Vitals: Blood pressure 112/68, pulse (!) 53, height 5' 4" (1 626 m), weight 56 7 kg (125 lb), currently breastfeeding  Body mass index is 21 46 kg/m²  Past Medical History:   Diagnosis Date    History of kidney stones      Past Surgical History:   Procedure Laterality Date    KIDNEY STONE SURGERY      in [place]        Physical Exam  Exam conducted with a chaperone present  Constitutional:       General: She is not in acute distress  Appearance: Normal appearance  She is well-developed and normal weight  HENT:      Head: Normocephalic and atraumatic  Cardiovascular:      Rate and Rhythm: Normal rate  Pulmonary:      Effort: Pulmonary effort is normal  No respiratory distress  Abdominal:      Palpations: Abdomen is soft  Tenderness: There is no abdominal tenderness  Genitourinary:     General: Normal vulva  Vagina: No vaginal discharge  Comments: IUD strings present at the external cervical os  No cervical erythema or bleeding  Skin:     General: Skin is warm and dry  Neurological:      Mental Status: She is alert and oriented to person, place, and time     Psychiatric:         Behavior: Behavior normal            Mehul Baird MD  7/30/2021  1:41 PM

## 2022-08-11 NOTE — PROGRESS NOTES
OB/GYN VISIT  Mita Calzada  2022  10:11 AM    Subjective    Mita Tabor is a 27 y o   female who presents for annual well woman exam   Last Pap smear 20  Last mammogram not yet indicated  Periods are irregular with a few days of light bleeding, followed by several days of heavy bleeding and painful cramping  No intermenstrual bleeding, spotting,but does endorse some yellow mucousy discharge with occasional itching  Menstrual History:  OB History        2    Para   2    Term   2            AB        Living   2       SAB        IAB        Ectopic        Multiple   0    Live Births   2                Menarche age: [age]  Patient's last menstrual period was 2022 (exact date)  Period Duration (Days): 5-21  Period Pattern: Regular  Menstrual Flow: Light, Heavy  Menstrual Control: Maxi pad  Dysmenorrhea: (!) Mild  Dysmenorrhea Symptoms: Cramping, Nausea, Headache        Review of Systems  occasional yellowish vaginal discharge, labial erythema or lesions, often experienes dyspareunia  Sexually active: yes -  with 1 male partner  Current contraception: IUD  History of abnormal Pap smear: no  Family history of breast, endometrial, uterine or ovarian cancer: no  History of abnormal mammogram: no  History of abnormal lipids: no  Gardasil vaccine: no    COVID Vaccine: no nut interested in getting it    Health Maintenance:    She exercises 0 days per week  She does not perform regular monthly self breast exams  She feels safe at home  She follows a somewhat well balanced diet  She does not use tobacco       Objective:  /80   Pulse 62   Ht 5' 4" (1 626 m)   Wt 56 3 kg (124 lb 3 2 oz)   LMP 2022 (Exact Date)   BMI 21 32 kg/m²  Body mass index is 21 32 kg/m²  Physical Exam:  GEN: The patient was alert and oriented x3, pleasant well-appearing female in no acute distress     HEENT:  Unremarkable, no anterior or posterior lymphadenopathy, no thyromegaly  CV:  Regular rate   RESP:  Unlabored breathing  BREAST:  Symmetric breasts with no palpable breast masses or obvious breast lesions  She has no retractions or nipple discharge  She has no axillary abnormalities or palpable masses  GI:  Soft, nontender, non-distended  : Normal appearing external female genitalia normal appea  MSK: bilateral lower extremities are nontender, no edemaring urethral meatus  On sterile speculum exam,  normal appearing vaginal epithelium, scant yellowish discharge noted, no bleeding, grossly normal appearing cervix with some areas of erythema   IUD strings not identified  On bimanual exam,  no cervical motion tenderness; uterus is smooth, mobile, nontender  No tenderness or fullness in the bilateral adnexa  ASSESSMENT/PLAN: Marline Hyde is a 27 y o  [id number] who presents for annual gynecologic exam     21-29  1  Routine well woman exam done today  2   Pap and HPV:Pap with HPV was not done today  Current ASCCP Guidelines reviewed  3   The patient declined STD testing  No testing performed  Safe sex practices have been discussed  4  The patient is sexually active  She already has contraception (Paraguard IUD) and options have been discussed  5  The following were reviewed in today's visit: breast self exam, STD testing, exercise and healthy diet  6  Patient to return to office in 3 months to discuss irregular bleeding  Problem List Items Addressed This Visit        Other    Irregular menstrual bleeding     Prescribed sprintec today  Follow-up in 3 months  Relevant Medications    norgestimate-ethinyl estradiol (Sprintec 28) 0 25-35 MG-MCG per tablet    Pain in female genitalia on intercourse     Pelvic US with transvaginal ordered            Relevant Orders    US pelvis complete w transvaginal      Other Visit Diagnoses     Women's annual routine gynecological examination    -  Primary    Relevant Orders    POCT wet mount          D/w   Corinne Fonder, MD  OBN PGY-1  8/12/2022 10:11 AM

## 2022-08-12 ENCOUNTER — ANNUAL EXAM (OUTPATIENT)
Dept: OBGYN CLINIC | Facility: CLINIC | Age: 27
End: 2022-08-12

## 2022-08-12 VITALS
WEIGHT: 124.2 LBS | HEIGHT: 64 IN | HEART RATE: 62 BPM | SYSTOLIC BLOOD PRESSURE: 116 MMHG | DIASTOLIC BLOOD PRESSURE: 80 MMHG | BODY MASS INDEX: 21.21 KG/M2

## 2022-08-12 DIAGNOSIS — N94.10 PAIN IN FEMALE GENITALIA ON INTERCOURSE: ICD-10-CM

## 2022-08-12 DIAGNOSIS — N92.6 IRREGULAR MENSTRUAL BLEEDING: ICD-10-CM

## 2022-08-12 DIAGNOSIS — Z01.419 WOMEN'S ANNUAL ROUTINE GYNECOLOGICAL EXAMINATION: Primary | ICD-10-CM

## 2022-08-12 LAB
BV WHIFF TEST VAG QL: NEGATIVE
CLUE CELLS SPEC QL WET PREP: NEGATIVE
SL AMB POCT WET MOUNT: NEGATIVE
T VAGINALIS VAG QL WET PREP: NEGATIVE
YEAST VAG QL WET PREP: NEGATIVE

## 2022-08-12 PROCEDURE — 99395 PREV VISIT EST AGE 18-39: CPT | Performed by: OBSTETRICS & GYNECOLOGY

## 2022-08-12 PROCEDURE — 87210 SMEAR WET MOUNT SALINE/INK: CPT | Performed by: OBSTETRICS & GYNECOLOGY

## 2022-08-12 RX ORDER — NORGESTIMATE AND ETHINYL ESTRADIOL 0.25-0.035
1 KIT ORAL DAILY
Qty: 28 TABLET | Refills: 2 | Status: SHIPPED | OUTPATIENT
Start: 2022-08-12

## 2022-08-16 ENCOUNTER — HOSPITAL ENCOUNTER (OUTPATIENT)
Dept: ULTRASOUND IMAGING | Facility: HOSPITAL | Age: 27
Discharge: HOME | End: 2022-08-16
Payer: COMMERCIAL

## 2022-08-16 DIAGNOSIS — N94.10 PAIN IN FEMALE GENITALIA ON INTERCOURSE: ICD-10-CM

## 2022-08-16 PROCEDURE — 76856 US EXAM PELVIC COMPLETE: CPT

## 2022-08-16 PROCEDURE — 76830 TRANSVAGINAL US NON-OB: CPT

## 2022-09-06 ENCOUNTER — OFFICE VISIT (OUTPATIENT)
Dept: URGENT CARE | Age: 27
End: 2022-09-06
Payer: COMMERCIAL

## 2022-09-06 VITALS
SYSTOLIC BLOOD PRESSURE: 102 MMHG | HEIGHT: 64 IN | RESPIRATION RATE: 18 BRPM | OXYGEN SATURATION: 99 % | DIASTOLIC BLOOD PRESSURE: 64 MMHG | BODY MASS INDEX: 21.13 KG/M2 | HEART RATE: 75 BPM | WEIGHT: 123.8 LBS | TEMPERATURE: 97.6 F

## 2022-09-06 DIAGNOSIS — Z02.4 DRIVER'S PERMIT PE (PHYSICAL EXAMINATION): Primary | ICD-10-CM

## 2022-09-06 PROCEDURE — G0382 LEV 3 HOSP TYPE B ED VISIT: HCPCS

## 2022-09-06 NOTE — PROGRESS NOTES
[address] [facility]        NAME: Emma Martin is a 27 y o  female  : 1995    MRN: [id number]  DATE: 2022  TIME: 10:59 AM    Assessment and Plan   's permit PE (physical examination) [Z02 4]  1  's permit PE (physical examination)       Patient presents for drivers physical  ( conducted with  present)  PMH reviewed  No medical questions/concerns  Assessment completed  Forms filled- cleared  Patient Instructions       Follow up with PCP as needed    Chief Complaint     Chief Complaint   Patient presents with    Annual Exam     Drivers Permit Physical          History of Present Illness       Patient presents for drivers physical  ( conducted with  present)  PMH reviewed  No medical questions/concerns  Assessment completed  Forms filled- cleared  Review of Systems   Review of Systems   Constitutional: Negative for chills and fever  HENT: Negative for hearing loss  Eyes: Negative for pain and visual disturbance  Respiratory: Negative for apnea, shortness of breath and wheezing  Cardiovascular: Negative for chest pain and palpitations  Gastrointestinal: Negative for nausea and vomiting  Endocrine: Negative for polydipsia  Musculoskeletal: Negative for arthralgias and myalgias  Neurological: Negative for dizziness, seizures, syncope and headaches  Psychiatric/Behavioral: Negative for behavioral problems and suicidal ideas  All other systems reviewed and are negative          Current Medications       Current Outpatient Medications:     norgestimate-ethinyl estradiol (Sprintec 28) 0 25-35 MG-MCG per tablet, Take 1 tablet by mouth daily, Disp: 28 tablet, Rfl: 2    Current Allergies     Allergies as of 2022    (No Known Allergies)            The following portions of the patient's history were reviewed and updated as appropriate: allergies, current medications, past family history, past medical history, past social history, past surgical history and problem list      Past Medical History:   Diagnosis Date    History of kidney stones        Past Surgical History:   Procedure Laterality Date    KIDNEY STONE SURGERY      in [place]        Family History   Problem Relation Age of Onset    Tuberculosis Mother     Heart attack Father 64    No Known Problems Sister     No Known Problems Brother     No Known Problems Son     No Known Problems Maternal Grandmother     No Known Problems Maternal Grandfather     No Known Problems Paternal Grandmother     No Known Problems Brother     No Known Problems Brother          Medications have been verified  Objective   /64   Pulse 75   Temp 97 6 °F (36 4 °C) (Tympanic)   Resp 18   Ht 5' 4" (1 626 m)   Wt 56 2 kg (123 lb 12 8 oz)   SpO2 99%   BMI 21 25 kg/m²   No LMP recorded  Physical Exam     Physical Exam  Vitals reviewed  Constitutional:       General: She is not in acute distress  Appearance: Normal appearance  She is not ill-appearing  HENT:      Head: Normocephalic and atraumatic  Eyes:      Extraocular Movements: Extraocular movements intact  Pupils: Pupils are equal, round, and reactive to light  Cardiovascular:      Rate and Rhythm: Normal rate and regular rhythm  Pulses: Normal pulses  Heart sounds: Normal heart sounds  No murmur heard  Pulmonary:      Effort: Pulmonary effort is normal  No respiratory distress  Breath sounds: Normal breath sounds  Musculoskeletal:         General: No swelling, tenderness or deformity  Normal range of motion  Cervical back: Normal range of motion  Neurological:      General: No focal deficit present  Mental Status: She is alert  Mental status is at baseline  Cranial Nerves: No cranial nerve deficit  Psychiatric:         Mood and Affect: Mood normal          Behavior: Behavior normal          Thought Content:  Thought content normal

## 2022-10-24 NOTE — PROGRESS NOTES
OB/GYN VISIT  Mita Calzada  10/28/2022  9:23 AM    Subjective:     Karan Ji is a 27 y o   female who presents today to discuss the removal of her IUD  She had her IUD placed 21  She states that she desires removal because of the heavy bleeding  She says that prior to insertion of the IUD she had more managable periods  She had it interested about She intends to use Sprintec as contraception  Patient is currently on Sprintec due to irregular bleeding  She says that since taking the pill she has still has heavy vaginal bleeding for approximately 3 weeks each cycle  She wishes to use the pill as her only manner of contraception  We discussed that the pill must be taken every day  Patient understands and still desires removal  Will schedule follow-up procedure appointment  Patient is accompanied by her  who speaks [place] and [place]  Both say they are comfortable with his as  and decline cyracom  Patient was previoulsy seen in the office with a complaint of chronic pelvic pain  She received a TVUS which showed no abnormality  Past Medical History:   Diagnosis Date   • History of kidney stones      Past Surgical History:   Procedure Laterality Date   • KIDNEY STONE SURGERY      in [facility]        Objective:    Vitals: Blood pressure 106/73, pulse 68, resp  rate 18, height 5' 5" (1 651 m), weight 57 6 kg (127 lb), currently breastfeeding  Body mass index is 21 13 kg/m²  Physical Exam  Vitals reviewed  Constitutional:       Appearance: Normal appearance  HENT:      Head: Normocephalic and atraumatic  Right Ear: Tympanic membrane normal       Left Ear: Tympanic membrane normal    Cardiovascular:      Rate and Rhythm: Normal rate and regular rhythm  Pulses: Normal pulses  Heart sounds: Normal heart sounds  Pulmonary:      Effort: Pulmonary effort is normal       Breath sounds: Normal breath sounds  Abdominal:      General: Abdomen is flat  Palpations: Abdomen is soft  Musculoskeletal:         General: Normal range of motion  Cervical back: Normal range of motion  Skin:     General: Skin is warm and dry  Neurological:      General: No focal deficit present  Mental Status: She is alert and oriented to person, place, and time  Psychiatric:         Mood and Affect: Mood normal          Behavior: Behavior normal          Thought Content: Thought content normal        Review of Systems   Constitutional: Negative for chills and fever  Respiratory: Negative for cough, shortness of breath and wheezing  Cardiovascular: Negative for chest pain and leg swelling  Gastrointestinal: Negative for abdominal pain, diarrhea, nausea and vomiting  Genitourinary: Negative for pelvic pain, vaginal bleeding and vaginal discharge  Musculoskeletal: Negative for back pain  Neurological: Negative for weakness, light-headedness and headaches  Assessment/Plan:  Problem List Items Addressed This Visit        Other    IUD (intrauterine device) in place     Desires removal at this time due to heavy vaginal bleeding  Will schedule procedure appointment  Irregular menstrual bleeding - Primary     Began taking Sprintec in August 2022 to attempt to control bleeding  Still has heavy bleeding, sometimes for three weeks at a time  Desires removal of Paraguard IUD  Pain in female genitalia on intercourse     TVUS in September 2022 showed no structural abnormalities  D/w Dr Keyonna Queen  10/28/2022  9:23 AM    Please note, all notes within the [facility] are written in medical terminology for other doctors to read  If you have a question about something you see in your chart, please don't hesitate to ask about it at your next appointment   All test results are immediately available through [email or web address] as well, and I will review results at my earliest opportunity and contact you with the appropriate urgency

## 2022-10-28 ENCOUNTER — OFFICE VISIT (OUTPATIENT)
Dept: OBGYN CLINIC | Facility: CLINIC | Age: 27
End: 2022-10-28

## 2022-10-28 VITALS
HEART RATE: 68 BPM | SYSTOLIC BLOOD PRESSURE: 106 MMHG | WEIGHT: 127 LBS | HEIGHT: 65 IN | BODY MASS INDEX: 21.16 KG/M2 | RESPIRATION RATE: 18 BRPM | DIASTOLIC BLOOD PRESSURE: 73 MMHG

## 2022-10-28 DIAGNOSIS — N92.6 IRREGULAR MENSTRUAL BLEEDING: Primary | ICD-10-CM

## 2022-10-28 DIAGNOSIS — N94.10 PAIN IN FEMALE GENITALIA ON INTERCOURSE: ICD-10-CM

## 2022-10-28 DIAGNOSIS — Z97.5 IUD (INTRAUTERINE DEVICE) IN PLACE: ICD-10-CM

## 2022-10-28 NOTE — ASSESSMENT & PLAN NOTE
Began taking Sprintec in August 2022 to attempt to control bleeding  Still has heavy bleeding, sometimes for three weeks at a time  Desires removal of Paraguard IUD

## 2022-11-01 NOTE — PROGRESS NOTES
Iud removal    Date/Time: 11/4/2022 8:49 AM  Performed by: Derek Gaona MD  Authorized by: Derek Gaona MD   Universal Protocol:  Consent: Verbal consent obtained  Written consent obtained  Consent given by: patient  Patient understanding: patient states understanding of the procedure being performed  Patient consent: the patient's understanding of the procedure matches consent given  Procedure consent: procedure consent matches procedure scheduled  Relevant documents: relevant documents present and verified  Test results: test results available and properly labeled  Site marked: the operative site was not marked  Radiology Images displayed and confirmed  If images not available, report reviewed: imaging studies not available  Patient identity confirmed: verbally with patient      Procedure:     Removed with no complications: yes      Removal due to mechanical complications of IUD: no      Removal due to infection and inflammatory reaction: no      Other reason for removal:  Heavy bleeding       Description of procedure: The speculum was placed and the IUD string visualized  Using ringed forceps, the IUD string was grasped and the device was removed without encountering resistance  The IUD was inspected and observed to be intact  The IUD was shown to the patient  Dr Hai Vail was present for the entire procedure  Patient instructed to return to [facility] in 3 months for followup       Delta Diaz MD  OBGYN PGY-1  11/4/2022 9:05 AM

## 2022-11-04 ENCOUNTER — PROCEDURE VISIT (OUTPATIENT)
Dept: OBGYN CLINIC | Facility: CLINIC | Age: 27
End: 2022-11-04

## 2022-11-04 VITALS
BODY MASS INDEX: 21.46 KG/M2 | DIASTOLIC BLOOD PRESSURE: 68 MMHG | HEIGHT: 65 IN | WEIGHT: 128.8 LBS | SYSTOLIC BLOOD PRESSURE: 109 MMHG | HEART RATE: 70 BPM

## 2022-11-04 DIAGNOSIS — Z97.5 IUD (INTRAUTERINE DEVICE) IN PLACE: Primary | ICD-10-CM

## 2022-11-07 DIAGNOSIS — N92.6 IRREGULAR MENSTRUAL BLEEDING: ICD-10-CM

## 2022-11-09 RX ORDER — NORGESTIMATE AND ETHINYL ESTRADIOL 0.25-0.035
KIT ORAL
Qty: 28 TABLET | Refills: 2 | Status: SHIPPED | OUTPATIENT
Start: 2022-11-09

## 2023-02-27 DIAGNOSIS — N92.6 IRREGULAR MENSTRUAL BLEEDING: ICD-10-CM

## 2023-02-27 RX ORDER — NORGESTIMATE AND ETHINYL ESTRADIOL 0.25-0.035
KIT ORAL
Qty: 28 TABLET | Refills: 2 | Status: SHIPPED | OUTPATIENT
Start: 2023-02-27

## 2023-05-19 ENCOUNTER — OFFICE VISIT (OUTPATIENT)
Dept: FAMILY MEDICINE CLINIC | Facility: CLINIC | Age: 28
End: 2023-05-19

## 2023-05-19 VITALS
WEIGHT: 125 LBS | TEMPERATURE: 97.8 F | RESPIRATION RATE: 16 BRPM | HEIGHT: 64 IN | HEART RATE: 69 BPM | OXYGEN SATURATION: 99 % | DIASTOLIC BLOOD PRESSURE: 70 MMHG | SYSTOLIC BLOOD PRESSURE: 120 MMHG | BODY MASS INDEX: 21.34 KG/M2

## 2023-05-19 DIAGNOSIS — Z76.89 ENCOUNTER TO ESTABLISH CARE: Primary | ICD-10-CM

## 2023-05-19 DIAGNOSIS — Z3A.01 LESS THAN 8 WEEKS GESTATION OF PREGNANCY: ICD-10-CM

## 2023-05-19 DIAGNOSIS — Z32.01 ENCOUNTER FOR PREGNANCY TEST, RESULT POSITIVE: ICD-10-CM

## 2023-05-19 PROBLEM — Z97.5 IUD (INTRAUTERINE DEVICE) IN PLACE: Status: RESOLVED | Noted: 2021-07-30 | Resolved: 2023-05-19

## 2023-05-19 LAB — SL AMB POCT URINE HCG: POSITIVE

## 2023-05-19 NOTE — PROGRESS NOTES
Name: Sterling Trent      : 1995      MRN: [id number]  Encounter Provider: RYAN Palencia  Encounter Date: 2023   Encounter department: [address] [place] [address]     1  Encounter to establish care    2  Less than 8 weeks gestation of pregnancy  Assessment & Plan:  Home pregnancy test positive  Pt requests verification of pregnancy to provide to employer in an effort to be placed on light duty  - POCT HCG: positive  Copy of results provided to pt  - Pt requests referral for closer GYN, referral provided for [facility], however pt undecided about desired birth location and may continue with current provider   - Continue daily prenatal multivitamin  Orders:  -     Ambulatory Referral to Obstetrics / Gynecology; Future  -     POCT urine HCG  -     CBC and differential; Future  -     Comprehensive metabolic panel; Future  -     TSH, 3rd generation with Free T4 reflex; Future         Subjective     HPI     Mita presents to the office accompanied by her  and children to establish care   assisted with language translation when needed  Pt has tested positive on a home pregnancy test  She has an OB/GYN appt scheduled for 23  She denies HTN, DM, or any other complications during previous pregnancies  She has had some nausea, but otherwise feeling well  PMH includes sickle cell trait and kidney stones  Review of Systems   Constitutional: Negative for activity change, appetite change, fatigue, fever and unexpected weight change  Respiratory: Negative for cough, chest tightness, shortness of breath and wheezing  Cardiovascular: Negative for chest pain, palpitations and leg swelling  Gastrointestinal: Positive for nausea  Negative for abdominal pain, blood in stool, constipation, diarrhea and vomiting  Endocrine: Negative for polyuria     Genitourinary: Negative for decreased urine volume, difficulty urinating and dysuria  Neurological: Negative for dizziness and headaches  Psychiatric/Behavioral: Negative for dysphoric mood and sleep disturbance  The patient is not nervous/anxious  All other systems reviewed and are negative  Past Medical History:   Diagnosis Date   • History of kidney stones    • IUD (intrauterine device) in place 7/30/2021    Paragard inserted 7/16/21     Past Surgical History:   Procedure Laterality Date   • KIDNEY STONE SURGERY  2018    in [facility]      Family History   Problem Relation Age of Onset   • Tuberculosis Mother    • Heart attack Father 64   • No Known Problems Sister    • No Known Problems Brother    • No Known Problems Son    • No Known Problems Maternal Grandmother    • No Known Problems Maternal Grandfather    • No Known Problems Paternal Grandmother    • No Known Problems Brother    • No Known Problems Brother      Social History     Socioeconomic History   • Marital status: /Civil Union     Spouse name: None   • Number of children: 2   • Years of education: None   • Highest education level: None   Occupational History   • None   Tobacco Use   • Smoking status: Never   • Smokeless tobacco: Never   Vaping Use   • Vaping Use: Never used   Substance and Sexual Activity   • Alcohol use: Never   • Drug use: Never   • Sexual activity: Yes     Partners: Male     Birth control/protection: None   Other Topics Concern   • None   Social History Narrative   • None     Social Determinants of Health     Financial Resource Strain: Low Risk  (10/28/2022)    Overall Financial Resource Strain (CARDIA)    • Difficulty of Paying Living Expenses: Not hard at all   Food Insecurity: No Food Insecurity (10/28/2022)    Hunger Vital Sign    • Worried About Running Out of Food in the Last Year: Never true    • Ran Out of Food in the Last Year: Never true   Transportation Needs: No Transportation Needs (10/28/2022)    PRAPARE - Transportation    • Lack of Transportation (Medical):  No    • Lack of "Transportation (Non-Medical): No   Recent Concern: Transportation Needs - Unmet Transportation Needs (8/12/2022)    PRAPARE - Transportation    • Lack of Transportation (Medical): Yes    • Lack of Transportation (Non-Medical): No   Physical Activity: Not on file   Stress: Not on file   Social Connections: Not on file   Intimate Partner Violence: Not on file   Housing Stability: Low Risk  (10/28/2022)    Housing Stability Vital Sign    • Unable to Pay for Housing in the Last Year: No    • Number of [place] in the Last Year: 1    • Unstable Housing in the Last Year: No     Current Outpatient Medications on File Prior to Visit   Medication Sig   • Prenatal Vit-Fe Fumarate-FA (PRENATAL PO) Take by mouth     No Known Allergies  Immunization History   Administered Date(s) Administered   • Tdap 05/10/2021       Objective     /70 (BP Location: Left arm, Patient Position: Sitting, Cuff Size: Standard)   Pulse 69   Temp 97 8 °F (36 6 °C) (Temporal)   Resp 16   Ht 5' 4\" (1 626 m)   Wt 56 7 kg (125 lb)   LMP 04/01/2023 (Exact Date)   SpO2 99%   Breastfeeding No   BMI 21 46 kg/m²     Physical Exam  Vitals reviewed  Constitutional:       General: She is not in acute distress  Appearance: She is normal weight  She is not ill-appearing or diaphoretic  HENT:      Head: Normocephalic and atraumatic  Mouth/Throat:      Mouth: Mucous membranes are moist       Pharynx: Oropharynx is clear  Eyes:      General: Lids are normal       Conjunctiva/sclera: Conjunctivae normal       Pupils: Pupils are equal, round, and reactive to light  Neck:      Thyroid: No thyromegaly or thyroid tenderness  Cardiovascular:      Rate and Rhythm: Normal rate and regular rhythm  Pulses: Normal pulses  Heart sounds: Normal heart sounds  No murmur heard  Musculoskeletal:      Cervical back: Neck supple  Right lower leg: No edema  Left lower leg: No edema     Lymphadenopathy:      Cervical: No cervical " adenopathy  Neurological:      Mental Status: She is alert         RYAN Palencia

## 2023-05-28 PROBLEM — Z3A.01 LESS THAN 8 WEEKS GESTATION OF PREGNANCY: Status: ACTIVE | Noted: 2023-05-28

## 2023-05-28 NOTE — ASSESSMENT & PLAN NOTE
Home pregnancy test positive  Pt requests verification of pregnancy to provide to employer in an effort to be placed on light duty  - POCT HCG: positive  Copy of results provided to pt  - Pt requests referral for closer GYN, [facility], however pt undecided about desired birth location and may continue with current provider   - Continue daily prenatal multivitamin

## 2023-06-02 ENCOUNTER — ULTRASOUND (OUTPATIENT)
Dept: OBGYN CLINIC | Facility: CLINIC | Age: 28
End: 2023-06-02

## 2023-06-02 VITALS
HEART RATE: 75 BPM | WEIGHT: 125.4 LBS | RESPIRATION RATE: 18 BRPM | DIASTOLIC BLOOD PRESSURE: 81 MMHG | SYSTOLIC BLOOD PRESSURE: 114 MMHG | HEIGHT: 63 IN | BODY MASS INDEX: 22.22 KG/M2

## 2023-06-02 DIAGNOSIS — Z32.01 PREGNANCY TEST POSITIVE: Primary | ICD-10-CM

## 2023-06-02 LAB — SL AMB POCT URINE HCG: ABNORMAL

## 2023-06-02 NOTE — PROGRESS NOTES
[address] [place] [address]   ULTRASOUND VISIT       ASSESSMENT/PLAN:  IUP at 10w7d  - Will use LMP as final method of dating   - RTC for nursing intake and prenatal H&P  - Prenatal labs to be ordered  - Continue prenatal vitamin     SUBJECTIVE:  Brief HPI: Osman Resendiz is a 32 y o   female who presents for viability scan and dating for new pregnancy  She has been taking prenatal vitamins  She has previously had 2 vaginal deliveries without history of diabetes, hypertension, or postpartum hemorrhage as per patient  Reports LMP was 23, giving ÁNGEL of 24 and current GA of 8w6d  This pregnancy is planned and desired  Current symptoms: Patient denies nausea, vomiting, cramping, or vaginal bleeding  OB History    Para Term  AB Living   3 2 2     2   SAB IAB Ectopic Multiple Live Births         0 2      # Outcome Date GA Lbr Clemente/2nd Weight Sex Delivery Anes PTL Lv   3 Current            2 Term 21 40w0d / 00:26 3030 g (6 lb 10 9 oz) M Vag-Spont EPI N YO   1 Term 16     Vag-Spont   YO       Past Medical History:   Diagnosis Date   • History of kidney stones    • IUD (intrauterine device) in place 2021    Paragard inserted 21       Past Surgical History:   Procedure Laterality Date   • KIDNEY STONE SURGERY  2018    in [facility]        Review of Systems   Constitutional: Negative for chills and fever  Respiratory: Negative for cough and shortness of breath  Cardiovascular: Negative for chest pain and leg swelling  Gastrointestinal: Negative for abdominal pain, nausea and vomiting  Genitourinary: Negative for dysuria, pelvic pain, urgency, vaginal bleeding and vaginal discharge  Neurological: Negative for dizziness, light-headedness and headaches  All other systems reviewed and are negative        OBJECTIVE:  Vitals:    23 0827   BP: 114/81   Pulse: 75   Resp: 18      GEN: The patient was alert and oriented x3, pleasant well-appearing female in no acute distress  CV: Regular rate  PULM: nonlabored respirations  MSK: Normal gait  Skin: warm, dry  Neuro: no focal deficits  Psych: normal affect and judgement, cooperative    FIRST TRIMESTER OBSTETRIC ULTRASOUND  INDICATION: Amenorrhea, viability  COMPARISON: None  TECHNIQUE:   Transvaginal imaging was performed to assess the gestation, myometrial/endometrial architecture and ovarian parenchymal detail  The study includes volumetric sweeps and traditional still imaging technique  FINDINGS    GESTATIONAL SAC:   Round, normal in appearance  AMNIOTIC FLUID/SAC SHAPE: Within expected normal range  FETAL POLE:  CRL:     23 9 mm = 9w0d  25 8 mm = 9w1d  25 8 mm = 9w3d    Cardiac activity is detected at 155 bpm      YOLK SAC:  Round, normal in appearance  IMPRESSION:  Single, viable IUP measuring 9w2d by this ultrasound  Single, viable IUP measuring 8w6d by LMP of 4/1/23   Fetal cardiac activity present and WNL  Uterus and adnexa normal in appearance              Alannah Davila MD  PGY-1, OBGYN  06/02/23

## 2023-06-08 ENCOUNTER — TELEPHONE (OUTPATIENT)
Facility: HOSPITAL | Age: 28
End: 2023-06-08

## 2023-06-08 NOTE — TELEPHONE ENCOUNTER
Called patient to schedule MFM appointment, based on referral issued to Maternal Fetal Medicine by [facility] office  Unable to reach patient by phone  Patient does not have [address]

## 2023-06-16 ENCOUNTER — APPOINTMENT (OUTPATIENT)
Dept: LAB | Age: 28
End: 2023-06-16
Payer: COMMERCIAL

## 2023-06-16 DIAGNOSIS — Z3A.01 LESS THAN 8 WEEKS GESTATION OF PREGNANCY: ICD-10-CM

## 2023-06-16 DIAGNOSIS — Z32.01 PREGNANCY TEST POSITIVE: ICD-10-CM

## 2023-06-16 LAB
ABO GROUP BLD: NORMAL
ALBUMIN SERPL BCP-MCNC: 3.4 G/DL (ref 3.5–5)
ALP SERPL-CCNC: 55 U/L (ref 46–116)
ALT SERPL W P-5'-P-CCNC: 16 U/L (ref 12–78)
ANION GAP SERPL CALCULATED.3IONS-SCNC: 3 MMOL/L (ref 4–13)
AST SERPL W P-5'-P-CCNC: 15 U/L (ref 5–45)
BASOPHILS # BLD AUTO: 0.04 THOUSANDS/ÂΜL (ref 0–0.1)
BASOPHILS NFR BLD AUTO: 1 % (ref 0–1)
BILIRUB SERPL-MCNC: 0.47 MG/DL (ref 0.2–1)
BLD GP AB SCN SERPL QL: NEGATIVE
BUN SERPL-MCNC: 7 MG/DL (ref 5–25)
CALCIUM ALBUM COR SERPL-MCNC: 9.6 MG/DL (ref 8.3–10.1)
CALCIUM SERPL-MCNC: 9.1 MG/DL (ref 8.3–10.1)
CHLORIDE SERPL-SCNC: 109 MMOL/L (ref 96–108)
CO2 SERPL-SCNC: 25 MMOL/L (ref 21–32)
CREAT SERPL-MCNC: 0.43 MG/DL (ref 0.6–1.3)
EOSINOPHIL # BLD AUTO: 0.23 THOUSAND/ÂΜL (ref 0–0.61)
EOSINOPHIL NFR BLD AUTO: 4 % (ref 0–6)
ERYTHROCYTE [DISTWIDTH] IN BLOOD BY AUTOMATED COUNT: 14.7 % (ref 11.6–15.1)
FERRITIN SERPL-MCNC: 18 NG/ML (ref 11–307)
GFR SERPL CREATININE-BSD FRML MDRD: 137 ML/MIN/1.73SQ M
GLUCOSE P FAST SERPL-MCNC: 86 MG/DL (ref 65–99)
HBV SURFACE AB SER-ACNC: <3 MIU/ML
HBV SURFACE AG SER QL: NORMAL
HCT VFR BLD AUTO: 36.9 % (ref 34.8–46.1)
HGB BLD-MCNC: 12.6 G/DL (ref 11.5–15.4)
HIV 1+2 AB+HIV1 P24 AG SERPL QL IA: NORMAL
HIV 2 AB SERPL QL IA: NORMAL
HIV1 AB SERPL QL IA: NORMAL
HIV1 P24 AG SERPL QL IA: NORMAL
IMM GRANULOCYTES # BLD AUTO: 0.02 THOUSAND/UL (ref 0–0.2)
IMM GRANULOCYTES NFR BLD AUTO: 0 % (ref 0–2)
LYMPHOCYTES # BLD AUTO: 1.79 THOUSANDS/ÂΜL (ref 0.6–4.47)
LYMPHOCYTES NFR BLD AUTO: 30 % (ref 14–44)
MCH RBC QN AUTO: 26.3 PG (ref 26.8–34.3)
MCHC RBC AUTO-ENTMCNC: 34.1 G/DL (ref 31.4–37.4)
MCV RBC AUTO: 77 FL (ref 82–98)
MONOCYTES # BLD AUTO: 0.46 THOUSAND/ÂΜL (ref 0.17–1.22)
MONOCYTES NFR BLD AUTO: 8 % (ref 4–12)
NEUTROPHILS # BLD AUTO: 3.44 THOUSANDS/ÂΜL (ref 1.85–7.62)
NEUTS SEG NFR BLD AUTO: 57 % (ref 43–75)
NRBC BLD AUTO-RTO: 0 /100 WBCS
PLATELET # BLD AUTO: 225 THOUSANDS/UL (ref 149–390)
PMV BLD AUTO: 12 FL (ref 8.9–12.7)
POTASSIUM SERPL-SCNC: 3.8 MMOL/L (ref 3.5–5.3)
PROT SERPL-MCNC: 7.9 G/DL (ref 6.4–8.4)
RBC # BLD AUTO: 4.8 MILLION/UL (ref 3.81–5.12)
RH BLD: POSITIVE
RUBV IGG SERPL IA-ACNC: 100.5 IU/ML
SODIUM SERPL-SCNC: 137 MMOL/L (ref 135–147)
SPECIMEN EXPIRATION DATE: NORMAL
TREPONEMA PALLIDUM IGG+IGM AB [PRESENCE] IN SERUM OR PLASMA BY IMMUNOASSAY: NORMAL
TSH SERPL DL<=0.05 MIU/L-ACNC: 0.75 UIU/ML (ref 0.45–4.5)
WBC # BLD AUTO: 5.98 THOUSAND/UL (ref 4.31–10.16)

## 2023-06-16 PROCEDURE — 86900 BLOOD TYPING SEROLOGIC ABO: CPT

## 2023-06-16 PROCEDURE — 80053 COMPREHEN METABOLIC PANEL: CPT

## 2023-06-16 PROCEDURE — 87147 CULTURE TYPE IMMUNOLOGIC: CPT

## 2023-06-16 PROCEDURE — 87086 URINE CULTURE/COLONY COUNT: CPT

## 2023-06-16 PROCEDURE — 86762 RUBELLA ANTIBODY: CPT

## 2023-06-16 PROCEDURE — 86901 BLOOD TYPING SEROLOGIC RH(D): CPT

## 2023-06-16 PROCEDURE — 87389 HIV-1 AG W/HIV-1&-2 AB AG IA: CPT

## 2023-06-16 PROCEDURE — 82728 ASSAY OF FERRITIN: CPT

## 2023-06-16 PROCEDURE — 86780 TREPONEMA PALLIDUM: CPT

## 2023-06-16 PROCEDURE — 85025 COMPLETE CBC W/AUTO DIFF WBC: CPT

## 2023-06-16 PROCEDURE — 36415 COLL VENOUS BLD VENIPUNCTURE: CPT

## 2023-06-16 PROCEDURE — 86850 RBC ANTIBODY SCREEN: CPT

## 2023-06-16 PROCEDURE — 86706 HEP B SURFACE ANTIBODY: CPT

## 2023-06-16 PROCEDURE — 84443 ASSAY THYROID STIM HORMONE: CPT

## 2023-06-16 PROCEDURE — 87340 HEPATITIS B SURFACE AG IA: CPT

## 2023-06-17 LAB — BACTERIA UR CULT: ABNORMAL

## 2023-06-21 ENCOUNTER — INITIAL PRENATAL (OUTPATIENT)
Dept: OBGYN CLINIC | Facility: CLINIC | Age: 28
End: 2023-06-21

## 2023-06-21 VITALS
HEIGHT: 64 IN | WEIGHT: 124.2 LBS | HEART RATE: 76 BPM | SYSTOLIC BLOOD PRESSURE: 108 MMHG | BODY MASS INDEX: 21.21 KG/M2 | DIASTOLIC BLOOD PRESSURE: 74 MMHG

## 2023-06-21 DIAGNOSIS — Z3A.01 LESS THAN 8 WEEKS GESTATION OF PREGNANCY: ICD-10-CM

## 2023-06-21 DIAGNOSIS — Z34.91 PRENATAL CARE IN FIRST TRIMESTER: Primary | ICD-10-CM

## 2023-06-21 PROCEDURE — T1001 NURSING ASSESSMENT/EVALUATN: HCPCS

## 2023-06-21 NOTE — PATIENT INSTRUCTIONS
[address] [place] [address] would like to say Congratulations on your pregnancy! We are happy that you have chosen us to be your health care provider during your pregnancy  Your health, the health of your unborn child (children) and your family is important to us  Now, more than ever, your health will be most important to you  Your baby's growth and progress can be affected by how well you take care of yourself  It's a good idea to plan ahead  It is a known fact that women who receive care early in pregnancy and throughout their pregnancy have healthier babies  Visits will be scheduled for you throughout your pregnancy  It is your duty to keep your appointments and follow directions for your care  The number of visits will be decided by your doctor  Don't be afraid to ask questions  Talk with your nurses and providers about your plans for birth and any concerns you may have  Maternal Fetal Medicine (MFM) at [phone]   1 hour appointment, only 1 support person allowed, NO children    Blood work : Please complete prior to your H&P appointment  You do NOT have to fast for any blood work unless instructed  CBC, Blood type and antibody screen, Urinalysis, Random glucose level, HIV, Syphilis, Hepatitis B  History &Physical: H&P appointment   Physical exam  Pelvic exam: GC/CT testing  If needed: Pap smear  Routine prenatal   Visits every 4 weeks until 28 weeks    Stay healthy during your pregnancy    Eat a variety of healthy foods  Healthy foods include fruits, vegetables, whole-grain breads, low-fat dairy foods, beans, lean meats, and fish  Drink liquids as directed  Ask how much liquid to drink each day and which liquids are best for you  Caffeine: It is not clear how caffeine affects pregnancy  Limit your intake of caffeine to avoid possible health problems  Limit caffeine to less than 200 milligrams each day     Caffeine may be found in coffee, tea, cola, sports drinks, and chocolate  Foods that contain mercury:  Mercury is naturally found in almost all types of fish and shellfish  Some types of fish absorb higher levels of mercury that can be harmful to an unborn baby  Eat only fish and shellfish that are low in mercury  Limit your intake of fish to 2 servings each week  Choose fish low in mercury such as canned light tuna, shrimp, crab, salmon, cod, or tilapia  Do not  eat fish high in mercury such as swordfish, tilefish, andreia mackerel, and shark  Each week, you may eat up to 12 ounces of fish or shellfish that have low levels of mercury  These include shrimp, canned light tuna, salmon, pollock, and catfish  Eat only 6 ounces of albacore (white) tuna per week  Albacore tuna has more mercury than canned tuna  Take prenatal vitamins as directed  Your need for certain vitamins and minerals, such as folic acid, increases during pregnancy  Prenatal vitamins provide some of the extra vitamins and minerals you need  Prenatal vitamins may also help to decrease the risk of certain birth defects  Weight: Ask how much weight you should gain during your pregnancy  Too much or too little weight gain can be unhealthy for you and your baby  Exercise: Talk to your healthcare provider about exercise  Moderate exercise can help you stay fit  Your healthcare provider will help you plan an exercise program that is safe for you during pregnancy  Drink plenty of fluids while exercising to stay hydrated  Be careful to avoid overheating  AVOID exercises that can make you lose your balance  Activities that can put your baby at risk i e  horseback riding, scuba diving, skiing or snowboarding  After your first trimester, avoid exercises that require you to lay flat on your back  AVOID exceeding a heart rate greater than 140 beats per minute  As long as you are able to hold a conversation while exercising your heart rate is likely acceptable  ALWAYS wear your seat belt    The shoulder belt should lay across your chest (between your breasts) and away from your neck  Secure the lap belt below your belly so that it fits snugly across your hips and pelvic bone  Perform Kegel exercise  Imagine yourself stopping the flow of urine, ashley the muscles of your pelvic floor  Hold that contraction for 10 seconds and release  They can be repeated 10-20 times per day  Do not smoke  If you smoke, it is never too late to quit  Smoking increases your risk of a miscarriage and other health problems during your pregnancy  Smoking can cause your baby to be born too early or weigh less at birth  Ask your healthcare provider for information if you need help quitting  Do not drink alcohol  Alcohol passes from your body to your baby through the placenta  It can affect your baby's brain development and cause fetal alcohol syndrome (FAS)  FAS is a group of conditions that causes mental, behavior, and growth problems  Alcohol:  Do not drink alcohol during pregnancy  Alcohol can increase your risk of a miscarriage (losing your baby)  Never use illegal or street drugs (such as marijuana or cocaine) while you are pregnant  Safety tips:  Avoid hot tubs and saunas  Do not use a hot tub or sauna while you are pregnant, especially during your first trimester  Hot tubs and saunas may raise your baby's temperature and increase the risk of birth defects  Avoid toxoplasmosis  This is an infection caused by eating raw meat or being around infected cat feces  It can cause birth defects, miscarriages, and other problems  Wash your hands after you touch raw meat  Make sure any meat is well-cooked before you eat it  Avoid raw eggs and unpasteurized milk  Use gloves or ask someone else to clean your cat's litter box while you are pregnant  Ask your healthcare provider about travel  The most comfortable time to travel is during the second trimester   Ask your healthcare provider if you can travel after 36 weeks  You may not be able to travel in an airplane after 36 weeks  He may also recommend that you avoid long road trips  Pregnancy Diet  WHAT YOU NEED TO KNOW:   What is a healthy diet during pregnancy? A healthy diet during pregnancy is a meal plan that provides the amount of calories and nutrients you need during pregnancy  Your body needs extra calories and nutrients to support your growing baby  You need to gain the right amount of weight for a healthy baby and pregnancy  Babies born at a healthy weight have a lower risk of certain health problems at birth and later in life  A healthy diet may help you avoid gaining too much weight  Too much weight gain may cause problems for you during your pregnancy and delivery  What should I AVOID while I am pregnant? Raw and undercooked foods: You should not eat undercooked or raw meat, poultry, eggs, fish, or shellfish (shrimp, crab, lobster)  Cook leftover foods and ready-to-eat foods such as hot dogs until they are steaming hot  Unpasteurized food:  Unpasteurized foods are foods that have not gone through the heating process (pasteurization) that destroys bacteria  You should not drink milk, juice, or cheese that has not been pasteurized  This includes Brie, feta, Camembert, blue, and [place] cheeses  Which foods can I eat while I am pregnant? Eat a variety of foods from each of the food groups listed below  Your dietitian will tell you how many servings you should have from each food group each day to get enough calories  The amount of calories you need depends on your daily activity, your weight before pregnancy, and current weight gain  Healthcare providers divide pregnancy into 3 periods of time called trimesters  In the first trimester, you usually do not need extra calories  In the second and third trimesters, most women should eat about 300 extra calories each day  What vitamin and mineral supplements may I need?   Your healthcare provider will tell you if you need a supplement and the type you should take  Talk to your healthcare provider before you take any other kind of supplement, including herbal (natural) supplements  Prenatal vitamins:  Eat a variety of healthy foods, even if you take a prenatal vitamin  If you forget to take your vitamin, do not take double the amount the next day  Folic acid:  You need at least 923 mcg of folic acid each day before you get pregnant  Folic acid helps to form your baby's brain and spinal cord in early pregnancy  During pregnancy, your daily need for folic acid increases to about 600 mcg  Get folic acid each day by eating citrus fruits and juices, green leafy vegetables, liver, or dried beans  Folic acid is also added to foods such as breakfast cereals, bread products, flour, and pasta  Iron:  Iron is a mineral the body needs to make hemoglobin, which is a part of red blood cells  Hemoglobin helps your blood carry oxygen from the lungs to the rest of your body  Foods that are good sources of iron are meat, poultry, fish, beans, spinach, and fortified cereals and breads  Your body will absorb iron better from non-meat sources if you have a source of vitamin C at the same time  Drink tea and coffee separately from iron-fortified foods and iron supplements  You need about 30 mg of iron each day during pregnancy  Calcium and vitamin D:  Women who do not eat dairy products may need a calcium and vitamin D supplement  Talk to your healthcare provider about calcium supplements if you do not regularly eat good sources of calcium  The amount of calcium you need is about 1,300 mg if you are between 15and 25years old and 1,000 mg if you are 23to 48years old  What other healthy guidelines should I follow? Vegetarians and vegans: If you are a vegetarian or vegan, get enough protein, vitamin B12, and iron during your pregnancy   Some non-meat sources of these nutrients are fortified cereals, nut butters, soy products (tofu and soymilk), nuts, grains, and legumes  These nutrients are also found in eggs and milk products  Cravings: You may have cravings for certain foods during your pregnancy  Foods that are high in calories, fat, and sugar should not replace healthy food choices  Some women have cravings for unusual substances such as amilcar, dirt, laundry starch, ice, and chalk  This condition is called pica  These may lead to health problems such as anemia and cause other health problems  Nausea and Vomiting in Pregnancy  Nausea and vomiting in pregnancy  can happen any time of day  These symptoms usually start before the 9th week of pregnancy, and end by the 14th week (second trimester)  Some women can have nausea and vomiting for a longer time  These symptoms can affect some women throughout the entire pregnancy  Nausea and vomiting do not harm your baby  These symptoms can make it hard for you to do your daily activities  Seek care immediately if:   You have signs of dehydration  Examples are dark yellow urine, dry mouth and lips, dry  skin, fast heartbeat, and urinating less than usual   You have severe abdominal pain  You feel too weak or dizzy to stand up  You see blood in your vomit or bowel movements  Contact your healthcare provider if:   You vomit more than 4 times in 1 day  You have not been able to keep liquids down for more than 1 day  You lose more than 2 pounds  You have a fever  Your nausea and vomiting continue longer than 14 weeks  You have questions or concerns about your condition or care  Treatment  for nausea and vomiting in pregnancy is usually not needed  Talk to your healthcare provider if your symptoms do not decrease with the changes suggested below  You may need vitamin B6 and medicine if these changes do not help, or your symptoms become severe  Nutrition changes you can make to manage nausea and vomiting:   Eat small meals throughout the day instead of 3 large meals  You may be more likely to have nausea and vomiting when your stomach is empty  Eat foods that are low in fat and high in protein  Examples are lean meat, beans, turkey, and chicken without the skin  Eat a small snack, such as crackers, dry cereal, or a small sandwich before you go to bed  Eat some crackers or dry toast before you get out of bed in the morning  Get out of bed slowly  Sudden movements could cause you to get dizzy and nauseated  Eat bland foods when you feel nauseated  Examples of bland foods include dry toast, dry cereal, plain pasta, white rice, and bread  Other bland foods include saltine crackers, bananas, gelatin, and pretzels  Avoid spicy, greasy, and fried foods  Avoid any other foods that make you feel nauseated  Drink liquids that contain ginger  Drink ginger ale made with real ginger or ginger tea made with fresh grated ginger  Ginger capsules or ginger candies may also help to decrease nausea and vomiting  Drink liquids between meals instead of with meals  Wait at least 30 minutes after you eat to drink liquids  Drink small amounts of liquids often throughout the day to prevent dehydration  Ask how much liquid you should drink each day  Other changes you can make to manage nausea and vomiting:   Avoid smells that bother you  Strong odors may cause nausea and vomiting to start, or make it worse  Take a short walk, turn on a fan, or try to sleep with the window open to get fresh air  When you are cooking, open windows to get rid of smells that may cause nausea  Do not brush your teeth right after you eat  if it makes you nauseated  Rest when you need to  Start activity slowly and work up to your usual routine as you start to feel better  Talk to your healthcare provider about your prenatal vitamins  Prenatal vitamins can cause nausea for some women  Try taking your prenatal vitamin at night or with a snack   If this change does not help, your healthcare provider may recommend a different type of vitamin  Do not use any medicines, vitamins, or supplements to manage your symptoms without asking your healthcare provider  Many medicines can harm an unborn baby  Light to moderate exercise  may help to decrease your symptoms  It may also help you to sleep better at night  Ask your healthcare provider about the best exercise plan for you  Breastfeeding   Benefits of breastfeeding  Are less likely to develop allergies  Have increased protection against bacterial meningitis  Have fewer middle ear infections  Have fewer learning disabilities  Have fewer behavioral difficulties  Have higher IQ's  Have lower rates of respiratory illness  Have lower obesity  Are less likely to develop juvenile diabetes and some childhood cancers  Are less likely to die from Sudden Infant Death Syndrome  A healthier baby means fewer visits to the doctor and the pharmacy  Breastfeeding is environmentally friendly  There is nothing to throw away  Breastfeeding is free; formula can cost over $1000 for the first year of life  There is less vaginal bleeding immediately after delivery and a faster return to your pre-pregnant size  Mothers who breastfeed a lifetime total of 2 years have:  A 40% decreased risk of breast cancer   A decreased risk of ovarian cancer  Lower rates of osteoporosis, diabetes and heart disease  Importance of exclusive breastfeeding  By providing the ideal food for the healthy growth and development of infants, exclusive  infants receive only breast milk  Exclusive breastfeeding for the first 6 months is very important to improve an infant's health and reduce childhood illness  Exclusive breastfeeding for the first 6 months  The American Academy of Pediatrics recommends exclusive breastfeeding for the first six months and continued breastfeeding with supplementation of solids for the next 6 months       Early initiation of breastfeeding  Women who feed their infants within the first hour of birth is referred to as Opal  initiation of breastfeeding” and ensures that the  receives colostrum  Colostrum is rich in antibodies and essential nutrients  Rooming-In  May stabilize 's breathing and heart rate  Reduce crying  Improve ability for  to maintain temperature and blood sugar levels  Early stimulation of baby's immune system  Calming effects  Easier and faster bonding   Rooming-in promotes getting to know your   Rooming-in makes breastfeeding easier  Women who room-in with their newborns make more milk and produce a good milk supply earlier  Becomes easier to recognize baby's feeding cues  Infants have longer breastfeeding sessions  Women who room-in with their newborns are more likely to exclusively breastfeed compared to women who are  from their newborns  Skin-to-Skin  Skin to skin contact should happen regardless of a woman's feeding preference or the mode of delivery  After the delivery of your baby, it's important that a healthy mother and her baby have uninterrupted skin-to-skin contact  Skin to skin contact should occur immediately after birth for a least one-two hours and until after the first feeding for breastfeeding mothers  Skin-to-skin contact means placing dried, unclothes newborns on their mother's bare chest, with warm blankets covering the baby's back  All routine procedures such as maternal and  assessments can take place during skin-to-skin contact or can be delayed until after the sensitive period immediately after birth  We are proud to offer extensive educational and support services to encourage mothers to breastfeed  This service offers information, education, and support for women who want to breast feed  Mothers can leave a message or breastfeeding question on the line anytime of the day  Nurses will respond within 72 hours  The Breast Feeding Answer Line is [phone] ([phone])   Please DO NOT leave urgent or emergent messages on this message line  Please DO contact your physician DIRECTLY if you have any urgent questions or concerns  In the event of a suspected emergency medical condition please CALL 911 or [address]      Attaching your baby at the Breast (English): [email or web address] [email or web address]-[email or web address]-[email or web address]    Attaching your baby at the Breast (Divehi):  [email or web address]-items/[email or web address]        Coronavirus (COVID-19) pregnancy FAQs: Medical experts answer your questions  From [employer] cy  com/0_coronavirus-covid-19-pregnancy-faq-medical-fgmfdmr-ykyzah-nw_80151304  bc (courtesy of [facility])  As of 3/18/20  By Katharine Estrada 37  Medically reviewed by Society for Maternal-Fetal Medicine       We asked parents-to-be to send us their most pressing questions about coronavirus (COVID-19)  Among them: Is it still safe to deliver in a hospital? What if my ob-gyn has the virus? We sent those questions to the top docs at the Society for Maternal-Fetal Medicine  Here are their answers  The coronavirus (COVID-19) pandemic has been declared a national emergency in the [facility] by [employer]  Moms-to-be like you are concerned about everything from getting medicines to managing disruptions at work  But above and beyond any worry about lifestyle changes is a focus on your health and the impact of COVID-19 on your pregnancy  We asked obstetrics doctors who handle the most complicated pregnancy issues to answer your questions about the coronavirus  Here are their responses, provided by Dr Tasia Robbins and her colleagues at the Society for [place] [address]  Am I at more risk for COVID-19 if I'm pregnant? We don't know   Pregnancy does change your immune system in ways that might make you more susceptible to viral respiratory infections like COVID-19  If you become infected, you might also be at higher risk for more severe illness compared to the general population  Although this does not appear to be the case with COVID-19, based on limited data so far, a higher risk has been true for pregnant women with other coronavirus infections (SARS-CoV and MERS-CoV) and other viral respiratory infections, such as flu  It's important to take preventive actions to avoid infection, such as washing your hands often and avoiding people who are sick  How might coronavirus affect my pregnancy? Again, we don't know  Women with other coronavirus infections (such as SARS-CoV) did not have miscarriage or stillbirth at higher rates than the general population  We know that having other respiratory viral infections during pregnancy, such as flu, has been associated with problems like low birth weight and  birth  Also, having a high fever early in pregnancy may increase the risk of certain birth defects  Could I transmit coronavirus to my baby during pregnancy or delivery? We don't know whether you could transmit COVID-19 to your baby before or during delivery  However, among the few case studies of infants born to mothers with COVID-23 published in peer-reviewed literature, none of the infants tested positive for the virus  Also, there was no virus detected in samples of amniotic fluid or breast milk  There have been a few reports of newborns as young as a few days old with infection, suggesting that a mother can transmit the infection to her infant through close contact after delivery  There have been no reports of mother-to-baby transmission for other coronaviruses (MERS-CoV and SARS-CoV), although only limited information is available  Is it safe for me to deliver at a hospital where there have been COVID-19 cases? Yes  We know that COVID-19 is a very scary virus   The good news is that hospitals are taking great precautions to keep patients and healthcare providers safe  When a patient is even suspected to have COVID-19, they are placed in a negative pressure room  (Think of these rooms as vacuums that suck and filter the air so it's safe for the other people in the hospital)  This makes it possible for you to deliver at the hospital without putting you or your baby at risk  Hospitals are also implementing stricter visiting policies to keep patients safe  It's worth calling your hospital to check if there are any new regulations to be aware of  What plans should I make now in case the hospital system is overwhelmed when it's time for me to deliver? This is a great question to talk with your doctor or midwife about when you're 34 to 36 weeks pregnant  Every hospital is making different plans for dealing with this scenario  I work in healthcare  Should I ask my doctor to excuse me from work until the baby is born? What if I work in a school, the travel [employer], or some other high-risk setting? Healthcare facilities should take care to limit the exposure of pregnant employees to patients with confirmed or suspected COVID-19, just as they would with other infectious cases  If you continue working, be sure to follow the CDC's risk assessment and infection control guidelines  If you work in a school, travel [employer], or other high-risk setting, talk with your employer about what it's doing to protect employees and minimize infection risks  Wash your hands often  What if my OB gets COVID-19? If your doctor or midwife tests positive for COVID-19, they will need to be quarantined until they recover and are no longer at risk of transmitting the virus  In this case, you'll be assigned to another OB in your doctor's practice (or you may choose another practitioner yourself)  Ask your new OB or your doctor's office if you should self-quarantine or be tested for the virus   (It will depend on when you last saw your provider and when that person tested positive )    Should we hold off on trying to conceive because of COVID-19? At this time, there's no reason to hold off on trying to get pregnant, but the data we have is really limited  For example, we don't think the virus causes birth defects or increases your risk of miscarriage  But we don't know whether you could transmit COVID-19 to your baby before or during delivery  We also don't know if the virus lives in semen or can be sexually transmitted  We have a babymoon scheduled in the next few months - should we cancel? If you're planning to travel internationally or on a cruise, you should strongly consider canceling  At this time, the virus has reached more than 140 countries, and there are travel bans to [place], most of [place], and [place]  Places where large numbers of people gather are at highest risk, especially airports and cruise ships  If you're planning travel in the [place] , note that any travel setting increases your risk of exposure, and there may be travel bans even in [facility] by the time you're scheduled to go  Even if you're state is not blocked, you'll definitely want to avoid traveling to communities where the virus is spreading  To find out where these places are, check The [place] Times map based on CDC data  For the most current advice to help you avoid exposure, check the CDC's COVID-19 travel page  Will the hospital separate me from my  and keep the baby in quarantine? If you test positive for COVID-19 or have been exposed but have no symptoms, the CDC, [employer] of Obstetricians and Gynecologists, and the Society for [employer] all recommend that you be  from your baby to decrease the risk of transmission to the baby  This would last until you are no longer at risk of transmitting the virus   If you do not have COVID-19 and have not been exposed to the virus, the hospital will not separate you from your   My hospital is restricting visitors and only allowing one support person  If my support person leaves after the delivery, will they be allowed to come back? Every hospital has different policies  Contact your hospital or labor and delivery unit a week or so before delivery to get the most up-to-date restrictions  In general, if your support person needs to leave, they would be allowed back unless they knew they were exposed to COVID-19 after leaving your company  Supriya understands that the coronavirus (COVID-19) pandemic is an evolving story and that your questions will change over time  We'll continue asking moms and dads in our Community what they want to know, and we'll get the answers from experts to keep them - and you - informed and supported  My mom was planning to fly here to help me care for my new baby after delivery  Should I tell her not to come? Yes  If your mom is over 61 or has any serious chronic medical conditions (such as heart disease, lung disease, or diabetes), she is at higher risk of serious illness from COVID-19 and should avoid air travel  And remember that any travel setting increases a person's risk of exposure  So, it may be risky to have her around the baby after she has been traveling  For the most current advice on traveling, check the CDC's COVID-19 travel page  Supriya understands that the coronavirus pandemic is an evolving story and that your questions will change over time  We'll continue asking moms and dads in our Community what they want to know, and we'll get the answers from experts to keep them - and you - informed and supported

## 2023-06-21 NOTE — LETTER
Dentist Letter            06/21/23          Mita Calzada  1995  [address] [facility] [address]               We have had several requests from local dentist requesting permission to perform procedures on our patients who are pregnant  We wish to respond with this letter regarding some of the more routine procedures that we have been asked about  The following procedures may be performed on our obstetric patients:   1  Administration of local anesthesia   2  Administration of antibiotics such as PCN, Ampicillin, and Erythromycin  3  Administration of pain medications such as Tylenol, Tylenol with codeine, and if needed Percocet  4  Shielded X-rays    Should you have any questions, please do not hesitate to contact at [phone]          Sincerely,    [facility] Team  [phone]

## 2023-06-21 NOTE — LETTER
[address] Letter    06/21/23        Mita Calzada  1995  [address] [facility] [address]       Roro Marroquin is a patient and under our care in our office  Mita Calzada's Estimated Date of Delivery: 1/6/24  Any questions or concerns feel free to contact our office           Respectfully,    [address] [facility]  [id number] [facility]/Naye Farah 15  [address] [facility]/[place]itra  [phone]   [facility]/[address]  [phone]

## 2023-06-21 NOTE — LETTER
Work Letter    06/21/23      Mita Zheng  1995  [facility]    Dear Mouna Ballard,      Your employee is a patient at [address] [place] [address]   We recommend that all pregnant women:    1  Have a well-ventilated workspace  2  Wear low-heeled shoes  3  Work no more than 40 hours per week  4  Have a 15 minute break every 2 hours and at least 30 minutes for a meal break  5  Use good body mechanics by bending at your knees to avoid back strain and lift no more than 20 pounds without assistance  Will need assistance with lifting over 20 lbs  6  Have ready access to bathrooms and water  She may continue to work until her due date unless medical complications arise  We anticipate she may return to work in 6-8 weeks after delivery       Sincerely,  Erasmo Ramirez 118 OB/GYN  [facility], [address] [facility]  Sandra Sherman  6   OFFICE:  [phone]    OR  [address] [facility], [id number] [place] [address]  OFFICE:  [phone]

## 2023-06-21 NOTE — LETTER
Proof of Pregnancy Letter      06/21/23            Mita Calzada  1995  [facility]      Digna Nava is a patient at our facility  Digna Nava Estimated Date of Delivery: 1/6/24       Any questions or concerns, please feel free to contact our office        Sincerely,      [facility]  [address], [address] [facility], [address]   Office:  [phone]

## 2023-06-21 NOTE — PROGRESS NOTES
OB INTAKE INTERVIEW  Pt presents for OB intake with her   Richard Kinney  offered pt declined  Pt will use  for translation  [id number]  OB History    Para Term  AB Living   3 2 2     2   SAB IAB Ectopic Multiple Live Births         0 2      # Outcome Date GA Lbr Clemente/2nd Weight Sex Delivery Anes PTL Lv   3 Current            2 Term 21 40w0d / 00:26 3030 g (6 lb 10 9 oz) M Vag-Spont EPI N YO   1 Term 18     Vag-Spont   YO     Hx of  delivery prior to 36 weeks 6 days:  No   If yes, place a referral for cervical surveillance at 16 weeks  Last Menstrual Period:    Patient's last menstrual period was 2023 (exact date)  Ultrasound date: 2023  9 weeks 2 days     Estimated Date of Delivery: 24   by LMP  H&P visit scheduled  2023 @ 0800  with RYAN Duke      Last pap smear: 2020  Findings; lab pap smear results: no abnormalities    Current Issues:  Constipation :   Yes, denied blood  RN educated pt on dietary changes and fluid intake   Headaches :   No  Cramping:  No  Spotting :   No  PICA cravings :  No  FOB Involved:   Yes  Planned pregnancy:  Yes  I have these concerns about this prenatal patient:   Sickle cell trait - per pt FOB tested with previous pregnancy   Ordered referral for [facility] Dentistry     Interview education  • [facility] Pregnancy Essentials reviewed and discussed   • Baby and [address] Handout  • [facility] MFM Handout  • Discussed genetic testing - pt is not interested at this time   • Prenatal lab work: Scripts printed and given to pt  • Influenza vaccine given today: N/A  • Discussed Tdap vaccine  Immunizations:   Immunization History   Administered Date(s) Administered   • Tdap 05/10/2021     Depression Screening Follow-up Plan: Patient's depression screening was negative with an Bary Reef score of  0  Clinically patient does not have depression  No treatment is required    Nurse/Family Partnership- referral placed:  N/A   If yes, place referral for nurse family partnership  BMI Counseling: Body mass index is 21 32 kg/m²  Tobacco Cessation Counseling: non-smoker  The numerous health risks of tobacco consumption were discussed  Infection Screening: Does the pt have a hx of MRSA? No  If yes- please follow MRSA protocol and obtain a nasal swab for MRSA culture  The patient was oriented to our practice and all questions were answered    Interviewed by: Trudi Berkowitz RN 06/21/23

## 2023-06-22 ENCOUNTER — PATIENT OUTREACH (OUTPATIENT)
Dept: OBGYN CLINIC | Facility: CLINIC | Age: 28
End: 2023-06-22

## 2023-06-22 NOTE — PROGRESS NOTES
BRENDAN HANKINS spoke with 33 y/o-M-G3:P2- Irish/english speaking for prenatal assessment  Pt resides with spouse and 2 kids  Pt reported pregnancy was intended and both are happy  Pt is a   Pt has MA and not sure if she will call [address]  Pt denies any mental health or domestic violence hx  Pt denies transportation issues  Pt claimed her spouse and his extended family are supportive  Pt denies any question at this time  BRENDAN HANKINS explained her role and gave contact information  Pt was encouraged to call at any time needed

## 2023-06-30 ENCOUNTER — ROUTINE PRENATAL (OUTPATIENT)
Dept: OBGYN CLINIC | Facility: CLINIC | Age: 28
End: 2023-06-30

## 2023-06-30 VITALS — WEIGHT: 122.36 LBS | DIASTOLIC BLOOD PRESSURE: 68 MMHG | SYSTOLIC BLOOD PRESSURE: 110 MMHG | BODY MASS INDEX: 21 KG/M2

## 2023-06-30 DIAGNOSIS — Z34.91 PRENATAL CARE IN FIRST TRIMESTER: ICD-10-CM

## 2023-06-30 DIAGNOSIS — Z3A.12 12 WEEKS GESTATION OF PREGNANCY: Primary | ICD-10-CM

## 2023-06-30 PROBLEM — R82.71 GBS BACTERIURIA: Status: ACTIVE | Noted: 2023-06-30

## 2023-06-30 NOTE — PROGRESS NOTES
Prenatal H&P  [facility]    Subjective:  Patient is a [id number] here for initial prenatal H&P  This is an intended pregnancy  Patient reports that her LMP was 23  Patient is currently doing well  She is here with her   Her previous pregnancies have been uncomplicated  She currently is unemployed, she is hoping to start work with [employer] soon  She has a good support system at home  She does not have a known family history of inheritable conditions such as physical or intellectual disabilities, birth defects, blood disorders, heart or neural tube defects  She denies recent travel  She denies use of nicotine, EtOH or recreational drug use  OB History    Para Term  AB Living   3 2 2     2   SAB IAB Ectopic Multiple Live Births         0 2      # Outcome Date GA Lbr Clemente/2nd Weight Sex Delivery Anes PTL Lv   3 Current            2 Term 21 40w0d / 00:26 3030 g (6 lb 10 9 oz) M Vag-Spont EPI N YO   1 Term 18     Vag-Spont   YO         Objective:   /68   Wt 55 5 kg (122 lb 5 7 oz)   LMP 2023 (Exact Date)   BMI 21 00 kg/m²     General:   alert and oriented, in no acute distress, appears stated age and cooperative   Heart: regular rate and rhythm, S1, S2 normal, no murmur, click, rub or gallop   Lungs: clear to auscultation bilaterally   Abdomen: soft, non-tender, without masses or organomegaly       Assessment and Plan:  1  LMP 23 with an ÁNGEL 2024    2  TVUS on 23 showed EGA 8w6d with an ÁNGEL consistent with LMP  3  Pap smear NILM 2020   4  <10,000 GBS on initial urine culture  5  Prenatal labs complete  6  Postpartum: patient plans on  breastfeeding  Different methods of contraception were discussed with patient, including progesterone only oral pills, depo provera, nexplanon, mirena, and paragard  Patient would like to use no contraception  7  Delivery consent form to be signed at 28 weeks    8  Scheduled with [facility] for ultrasound next week  9  Labor expectations discussed with patient, including appointment schedule, nutrition, weight gain, sexual intercourse, and nausea and vomiting  10  RTC in 4 weeks   Continue PNV QD

## 2023-07-06 ENCOUNTER — TELEPHONE (OUTPATIENT)
Facility: HOSPITAL | Age: 28
End: 2023-07-06

## 2023-07-06 NOTE — TELEPHONE ENCOUNTER
Received notification from [address] [place] [address] saying PT cancelled her NT w/MFM due to financial issues. Her medical assistance has been cancelled and she is not able to see MFM anymore.

## 2023-07-27 PROBLEM — Z3A.16 16 WEEKS GESTATION OF PREGNANCY: Status: ACTIVE | Noted: 2023-05-28

## 2023-07-27 NOTE — PROGRESS NOTES
[facility]  Name: Gasper Gaspar  MRN: [id number]  : 1995      ASSESSMENT/PLAN: IUP @ 16w6d   Problem List Items Addressed This Visit        Other    16 weeks gestation of pregnancy - Primary     -Prenatal labs, GBS bacteruria ( see prob list ) , other labs  wnl ; GC: collected today  -Cervical cancer screenin2020 NILM, Next due 2023   -Ultrasounds and aneuploidy screening:Cancelled initial appt due to insurance coverage issues. Level II is now scheduled for 23. pt is self pay and may not get NIPS or other additional testing. Social work consult placed.   -Delivery plan is . Infant feeding plan is breastfeeding  -Contraception plan: Declined, continue discussion at next visit  -Vaccinations:declined vaccination in previous pregnancy, offer TDap at 28 wks  - labor precautions advised. Return to office in 4 weeks           Relevant Orders    Chlamydia/GC amplified DNA by PCR (Completed)    Sickle cell trait ([address])     Partner unable to be tested due to lack of insurance/cost of test.  - screen in [place] screens for sickle cell disease. Pediatrician should be advised. -Needs UCx each trimester  -2nd tri UCx negative           GBS bacteriuria     Will need Penicillin G treatment during labor            SUBJECTIVE   32 y.o. [id number] 16w6d here for PN visit. She has  obstetric complaints, including pelvic pain, contractions, vaginal bleeding, loss of fluid, or decreased fetal movement.        OBJECTIVE:  Vitals:    23 0833   BP: 100/66   Pulse: 72   Resp: 18     TW.544 kg (1 lb 3.2 oz)    FHT: 150bpm        Marialuisa Amor MD  OB/GYN PGY-2  2023  5:57 PM

## 2023-07-27 NOTE — ASSESSMENT & PLAN NOTE
-Prenatal labs, GBS bacteruria ( see prob list ) , other labs  wnl ; GC: collected today  -Cervical cancer screenin2020 NILM, Next due 2023   -Ultrasounds and aneuploidy screening:Cancelled initial appt due to insurance coverage issues. Level II is now scheduled for 23. pt is self pay and may not get NIPS or other additional testing. Social work consult placed.   -Delivery plan is . Infant feeding plan is breastfeeding  -Contraception plan: Declined, continue discussion at next visit  -Vaccinations:declined vaccination in previous pregnancy, offer TDap at 28 wks  - labor precautions advised.  Return to office in 4 weeks

## 2023-07-28 ENCOUNTER — ROUTINE PRENATAL (OUTPATIENT)
Dept: OBGYN CLINIC | Facility: CLINIC | Age: 28
End: 2023-07-28

## 2023-07-28 VITALS
HEART RATE: 72 BPM | WEIGHT: 126.6 LBS | RESPIRATION RATE: 18 BRPM | SYSTOLIC BLOOD PRESSURE: 100 MMHG | DIASTOLIC BLOOD PRESSURE: 66 MMHG | HEIGHT: 64 IN | BODY MASS INDEX: 21.61 KG/M2

## 2023-07-28 DIAGNOSIS — R82.71 GBS BACTERIURIA: ICD-10-CM

## 2023-07-28 DIAGNOSIS — Z3A.16 16 WEEKS GESTATION OF PREGNANCY: Primary | ICD-10-CM

## 2023-07-28 DIAGNOSIS — D57.3 SICKLE CELL TRAIT (HCC): ICD-10-CM

## 2023-07-28 PROCEDURE — 87491 CHLMYD TRACH DNA AMP PROBE: CPT

## 2023-07-28 PROCEDURE — 87591 N.GONORRHOEAE DNA AMP PROB: CPT

## 2023-07-28 PROCEDURE — 99213 OFFICE O/P EST LOW 20 MIN: CPT | Performed by: OBSTETRICS & GYNECOLOGY

## 2023-08-01 LAB
C TRACH DNA SPEC QL NAA+PROBE: NEGATIVE
N GONORRHOEA DNA SPEC QL NAA+PROBE: NEGATIVE

## 2023-08-18 PROBLEM — N92.6 IRREGULAR MENSTRUAL BLEEDING: Status: RESOLVED | Noted: 2022-08-12 | Resolved: 2023-08-18

## 2023-08-21 ENCOUNTER — ROUTINE PRENATAL (OUTPATIENT)
Dept: PERINATAL CARE | Facility: OTHER | Age: 28
End: 2023-08-21

## 2023-08-21 VITALS
HEIGHT: 64 IN | WEIGHT: 130.4 LBS | BODY MASS INDEX: 22.26 KG/M2 | SYSTOLIC BLOOD PRESSURE: 112 MMHG | HEART RATE: 85 BPM | DIASTOLIC BLOOD PRESSURE: 64 MMHG

## 2023-08-21 DIAGNOSIS — Z32.01 PREGNANCY TEST POSITIVE: ICD-10-CM

## 2023-08-21 DIAGNOSIS — Z36.3 ENCOUNTER FOR ANTENATAL SCREENING FOR MALFORMATIONS: Primary | ICD-10-CM

## 2023-08-21 DIAGNOSIS — Z3A.20 20 WEEKS GESTATION OF PREGNANCY: ICD-10-CM

## 2023-08-21 DIAGNOSIS — D57.3 SICKLE CELL TRAIT (HCC): ICD-10-CM

## 2023-08-21 DIAGNOSIS — Z36.86 ENCOUNTER FOR ANTENATAL SCREENING FOR CERVICAL LENGTH: ICD-10-CM

## 2023-08-21 PROCEDURE — 99242 OFF/OP CONSLTJ NEW/EST SF 20: CPT | Performed by: NURSE PRACTITIONER

## 2023-08-21 PROCEDURE — 76805 OB US >/= 14 WKS SNGL FETUS: CPT | Performed by: OBSTETRICS & GYNECOLOGY

## 2023-08-21 PROCEDURE — 76817 TRANSVAGINAL US OBSTETRIC: CPT | Performed by: OBSTETRICS & GYNECOLOGY

## 2023-08-21 NOTE — PROGRESS NOTES
Jair Danielle      Dear Dr. Shanae Wall,      Thank you for requesting a  consultation on your patient  for the following indications: Anatomic survey    History  Medications: Prenatal vitamins  Allergies to medications: No known drug allergies  Past medical history: Sickle cell trait and renal calculi  Past surgical history:  surgery for removal of renal calculi  Past obstetrical history: [id number]. Between 2018 and 2021 she had 2 term vaginal deliveries of male neonates weighing between 6 pounds 10 ounces and approximately 8 pounds. She denies pregnancy complications. Social history: Denies current use of alcohol, tobacco or drugs of abuse. First generation family history: Noncontributory      Mita Sanabria  has no complaints today. She is here at 20w2d  for detailed anatomic survey. She reports fetal movements and does not report any vaginal bleeding or signs of labor. Problem list:  1. Sickle cell trait      Ultrasound findings:  A viable hathaway intrauterine pregnancy is seen on today's ultrasound, measuring consistent with established EDC. The fetal anatomic survey is complete, and no fetal anomalies are suspected. Amniotic fluid and placenta are within normal limits. Transvaginal cervical length is within normal limits (5.08 cm), indicating low risk for  birth. Pregnancy ultrasound has limitations and is unable to detect all forms of fetal congenital abnormalities. Specific counseling was provided on the following problems:  1. Partner testing for sickle cell trait was offered today but declined due to insurance issues. He was instructed to let us know if he obtains his insurance and would like to have testing done   2. Optional genetic testing was inadvertently not discussed at today's visit. A message was sent to her via my chart to call if she is interested in discussing this option as she could not be reached by phone.   3.  Carriers of the sickle cell trait have a higher risk for developing a UTI / pyelonephritis in pregnancy. Recommend a urine cx q trimester. Follow up recommended:   No further ultrasounds are recommended at this time. Fetal kickcounting was recommended. Routine prenatal care is advised. Please refer her back to our office as clinically indicated. Split-shared decision-making between [employer] and myself was utilized, with the majority of the time spent by Rene Kiser. Medical decision-making for this encounter was low (diagnosis low, data limited and risk low)  . Procedures that were completed today were charged separately. I reviewed her ultrasound pictures and recommended the medical decision making transcribed in the care of this patient.       Roddy Ramesh MD

## 2023-08-21 NOTE — LETTER
2023     Gayle Lozano MD  [address] [employer]  [address] [address]    Patient: Melina Starr   YOB: 1995   Date of Visit: 2023       Dear Dr. Pauline Javed: Thank you for referring Melina Starr to me for evaluation. Below are my notes for this consultation. If you have questions, please do not hesitate to call me. I look forward to following your patient along with you. Sincerely,        Bentley Leach MD        CC: No Recipients    Bentley Leach MD  2023  6:13 PM  Sign when Signing Kishore Vernon      Dear Dr. Pauline Javed,      Thank you for requesting a  consultation on your patient  for the following indications: Anatomic survey    History  Medications: Prenatal vitamins  Allergies to medications: No known drug allergies  Past medical history: Sickle cell trait and renal calculi  Past surgical history:  surgery for removal of renal calculi  Past obstetrical history: [id number]. Between 2018 and 2021 she had 2 term vaginal deliveries of male neonates weighing between 6 pounds 10 ounces and approximately 8 pounds. She denies pregnancy complications. Social history: Denies current use of alcohol, tobacco or drugs of abuse. First generation family history: Noncontributory      Mita Ordoñez  has no complaints today. She is here at 20w2d  for detailed anatomic survey. She reports fetal movements and does not report any vaginal bleeding or signs of labor. Problem list:  1. Sickle cell trait      Ultrasound findings:  A viable hathaway intrauterine pregnancy is seen on today's ultrasound, measuring consistent with established EDC. The fetal anatomic survey is complete, and no fetal anomalies are suspected. Amniotic fluid and placenta are within normal limits. Transvaginal cervical length is within normal limits (5.08 cm), indicating low risk for  birth.      Pregnancy ultrasound has limitations and is unable to detect all forms of fetal congenital abnormalities. Specific counseling was provided on the following problems:  1. Partner testing for sickle cell trait was offered today but declined due to insurance issues. He was instructed to let us know if he obtains his insurance and would like to have testing done   2. Optional genetic testing was inadvertently not discussed at today's visit. A message was sent to her via my chart to call if she is interested in discussing this option as she could not be reached by phone. 3.  Carriers of the sickle cell trait have a higher risk for developing a UTI / pyelonephritis in pregnancy. Recommend a urine cx q trimester. Follow up recommended:   No further ultrasounds are recommended at this time. Fetal kickcounting was recommended. Routine prenatal care is advised. Please refer her back to our office as clinically indicated. Split-shared decision-making between [employer] and myself was utilized, with the majority of the time spent by Reji Mcintosh. Medical decision-making for this encounter was low (diagnosis low, data limited and risk low)  . Procedures that were completed today were charged separately. I reviewed her ultrasound pictures and recommended the medical decision making transcribed in the care of this patient.       Jovanny Trinidad MD

## 2023-08-21 NOTE — PROGRESS NOTES
Ultrasound Probe Disinfection    A transvaginal ultrasound was performed. Prior to use, disinfection was performed with High Level Disinfection Process ([employer]). Probe serial number F3: [id number] was used.       Miriam Medrano RDMS, RVT  08/21/23  10:19 AM

## 2023-08-27 PROBLEM — Z3A.21 21 WEEKS GESTATION OF PREGNANCY: Status: ACTIVE | Noted: 2023-05-28

## 2023-08-28 ENCOUNTER — ROUTINE PRENATAL (OUTPATIENT)
Dept: OBGYN CLINIC | Facility: CLINIC | Age: 28
End: 2023-08-28

## 2023-08-28 VITALS
BODY MASS INDEX: 22.36 KG/M2 | HEIGHT: 64 IN | DIASTOLIC BLOOD PRESSURE: 67 MMHG | SYSTOLIC BLOOD PRESSURE: 103 MMHG | WEIGHT: 131 LBS | HEART RATE: 80 BPM

## 2023-08-28 DIAGNOSIS — O99.612 CONSTIPATION DURING PREGNANCY IN SECOND TRIMESTER: ICD-10-CM

## 2023-08-28 DIAGNOSIS — Z3A.21 21 WEEKS GESTATION OF PREGNANCY: ICD-10-CM

## 2023-08-28 DIAGNOSIS — K59.00 CONSTIPATION DURING PREGNANCY IN SECOND TRIMESTER: ICD-10-CM

## 2023-08-28 DIAGNOSIS — Z34.92 PRENATAL CARE IN SECOND TRIMESTER: Primary | ICD-10-CM

## 2023-08-28 PROCEDURE — PNV: Performed by: OBSTETRICS & GYNECOLOGY

## 2023-09-25 ENCOUNTER — ROUTINE PRENATAL (OUTPATIENT)
Dept: OBGYN CLINIC | Facility: CLINIC | Age: 28
End: 2023-09-25

## 2023-09-25 VITALS
SYSTOLIC BLOOD PRESSURE: 101 MMHG | HEART RATE: 72 BPM | DIASTOLIC BLOOD PRESSURE: 62 MMHG | WEIGHT: 135 LBS | BODY MASS INDEX: 23.05 KG/M2 | HEIGHT: 64 IN

## 2023-09-25 DIAGNOSIS — K59.00 CONSTIPATION DURING PREGNANCY IN SECOND TRIMESTER: ICD-10-CM

## 2023-09-25 DIAGNOSIS — O99.612 CONSTIPATION DURING PREGNANCY IN SECOND TRIMESTER: ICD-10-CM

## 2023-09-25 DIAGNOSIS — R82.71 GBS BACTERIURIA: ICD-10-CM

## 2023-09-25 DIAGNOSIS — Z3A.25 25 WEEKS GESTATION OF PREGNANCY: Primary | ICD-10-CM

## 2023-09-25 PROCEDURE — PNV: Performed by: OBSTETRICS & GYNECOLOGY

## 2023-09-25 RX ORDER — DOCUSATE SODIUM 100 MG/1
100 CAPSULE, LIQUID FILLED ORAL 2 TIMES DAILY
Qty: 60 CAPSULE | Refills: 1 | Status: SHIPPED | OUTPATIENT
Start: 2023-09-25

## 2023-09-25 NOTE — PROGRESS NOTES
[facility]   PRENATAL VISIT  Ronel Thompson  [id number]  1995        A/P:  Problem List        Genitourinary    Kidney stones       Other    Sickle cell trait ([address])    Overview     Partner unable to be tested due to lack of insurance/cost of test.  -[place] screen in [place] screens for sickle cell disease. Pediatrician should be advised. -Needs UCx each trimester  -2nd tri UCx negative         Pain in female genitalia on intercourse    Overview     Patient experiences insertional and deep pain with intercourse and since she became sexually active. 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 25 weeks gestation of pregnancy    Overview     -Prenatal labs [x] , GBS bacteruria , all other labs wnl   -Gonorrhea/chlamydia screening [x]  -Cervical cancer screenin2020 NILM, Next due 2023   -Ultrasounds and aneuploidy screening:Level II completed 23 [x] wnl, no further US needed  -MSAFP screening- declined. Patient now has insurance but does not desire testing at this time.  -Delivery plan is . Analgesia plan is likely epidural. Infant feeding plan is breastfeeding  -Contraception plan: previously had paragard IUD and was displeased with irregular bleeding. Would like POPs with eventual transition to OCPs  -Vaccinations: Offer TDap at 28 wks [ ]  -Delivery Consent: to be signed at 29 wks[ ]  - labor precautions advised. Return to office in 3 weeks           GBS bacteriuria    Overview     <10,000 GBS on initial OB urine culture. Constipation during pregnancy in second trimester    Current Assessment & Plan     Script sent for colace BID  Also recommended OTC miralax                S: 32 y.o. [id number] 25w2d here for PN visit. She denies contractions. She denies leakage of fluid and vaginal bleeding. She has felt good fetal movement.   Continuing to have constipation issues despite drinking smoothies, has not yet tried any medications. O:  Vitals:    09/25/23 0800   BP: 101/62   Pulse: 72     Physical Exam   GEN: The patient was alert and oriented x3, pleasant well-appearing female in no acute distress.    CV: Regular rate  PULM: Non-labored respirations  MSK: Normal gait  Skin: Warm, dry  Neuro: No focal deficits  Psych: Normal affect and judgement, cooperative      Fetal Heart Rate: 125       D/w Dr. Shirley Aquino MD  OB/GYN PGY-3  9/25/2023  8:37 AM

## 2023-10-09 ENCOUNTER — APPOINTMENT (OUTPATIENT)
Dept: LAB | Facility: HOSPITAL | Age: 28
End: 2023-10-09
Payer: COMMERCIAL

## 2023-10-09 DIAGNOSIS — Z3A.25 25 WEEKS GESTATION OF PREGNANCY: ICD-10-CM

## 2023-10-09 DIAGNOSIS — O99.012 ANEMIA AFFECTING PREGNANCY IN SECOND TRIMESTER: Primary | ICD-10-CM

## 2023-10-09 PROBLEM — O99.019 ANEMIA AFFECTING PREGNANCY: Status: ACTIVE | Noted: 2023-10-09

## 2023-10-09 LAB
ERYTHROCYTE [DISTWIDTH] IN BLOOD BY AUTOMATED COUNT: 13.9 % (ref 11.6–15.1)
FERRITIN SERPL-MCNC: 6 NG/ML (ref 11–307)
GLUCOSE 1H P 50 G GLC PO SERPL-MCNC: 124 MG/DL (ref 40–134)
HCT VFR BLD AUTO: 30 % (ref 34.8–46.1)
HGB BLD-MCNC: 9.9 G/DL (ref 11.5–15.4)
MCH RBC QN AUTO: 25.4 PG (ref 26.8–34.3)
MCHC RBC AUTO-ENTMCNC: 33 G/DL (ref 31.4–37.4)
MCV RBC AUTO: 77 FL (ref 82–98)
PLATELET # BLD AUTO: 192 THOUSANDS/UL (ref 149–390)
PMV BLD AUTO: 11.8 FL (ref 8.9–12.7)
RBC # BLD AUTO: 3.9 MILLION/UL (ref 3.81–5.12)
TREPONEMA PALLIDUM IGG+IGM AB [PRESENCE] IN SERUM OR PLASMA BY IMMUNOASSAY: NORMAL
WBC # BLD AUTO: 7.13 THOUSAND/UL (ref 4.31–10.16)

## 2023-10-09 PROCEDURE — 85027 COMPLETE CBC AUTOMATED: CPT

## 2023-10-09 PROCEDURE — 82728 ASSAY OF FERRITIN: CPT

## 2023-10-09 PROCEDURE — 86780 TREPONEMA PALLIDUM: CPT

## 2023-10-09 PROCEDURE — 36415 COLL VENOUS BLD VENIPUNCTURE: CPT

## 2023-10-09 PROCEDURE — 82950 GLUCOSE TEST: CPT

## 2023-10-09 RX ORDER — SODIUM CHLORIDE 9 MG/ML
20 INJECTION, SOLUTION INTRAVENOUS ONCE
Status: CANCELLED | OUTPATIENT
Start: 2023-11-01

## 2023-10-13 PROBLEM — Z3A.28 28 WEEKS GESTATION OF PREGNANCY: Status: ACTIVE | Noted: 2023-05-28

## 2023-10-13 NOTE — PROGRESS NOTES
[address] [facility]   PRENATAL VISIT  Skip Olivarez  [id number]  1995        A/P:  Problem List          Genitourinary    Kidney stones       Other    Sickle cell trait ([address])    Overview     Partner unable to be tested due to lack of insurance/cost of test.  -[place] screen in [place] screens for sickle cell disease. Pediatrician should be advised. -Needs UCx each trimester  -2nd tri UCx negative         Pain in female genitalia on intercourse    Overview     Patient experiences insertional and deep pain with intercourse and since she became sexually active. 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 28 weeks gestation of pregnancy    Overview     -Prenatal labs [x] , GBS bacteruria , all other labs wnl   -Gonorrhea/chlamydia screening [x]  -Cervical cancer screenin2020 NILM, Next due 2023   -Ultrasounds and aneuploidy screening:Level II completed 23 [x] wnl, no further US needed  -MSAFP screening- declined. Patient now has insurance but does not desire testing at this time. -28 week labs wnl except for anemia  -Delivery plan is . Analgesia plan is likely epidural. Infant feeding plan is breastfeeding  -Contraception plan: previously had paragard IUD and was displeased with irregular bleeding. Would like POPs with eventual transition to OCPs  -Vaccinations: Offer TDap at 28 wks [ ]  -Delivery Consent: to be signed at 29 wks[ ]  - labor precautions advised. Return to office in 2 weeks           GBS bacteriuria    Overview     <10,000 GBS on initial OB urine culture.           Constipation during pregnancy in second trimester    Current Assessment & Plan     Has not been using the miralax regularly but will try to do so going forward         Anemia affecting pregnancy    Overview     Hgb 9.9 on 10/9 with ferritin of 6  Will skip PO iron due to patient's significant issue with constipation  Venofer infusions ordered                S: 32 y.o. [id number] 28w2d here for PN visit. She denies contractions. She denies leakage of fluid and vaginal bleeding. She has felt good fetal movement. O:  Vitals:    10/16/23 0700   BP: 106/72   Pulse: 74     Physical Exam  GEN: The patient was alert and oriented x3, pleasant well-appearing female in no acute distress.    CV: Regular rate  PULM: Non-labored respirations  MSK: Normal gait  Skin: Warm, dry  Neuro: No focal deficits  Psych: Normal affect and judgement, cooperative    Fetal Heart Rate: 131       D/w Dr. Derrick Clements MD  OB/GYN PGY-3  10/16/2023  8:21 AM

## 2023-10-16 ENCOUNTER — ROUTINE PRENATAL (OUTPATIENT)
Dept: OBGYN CLINIC | Facility: CLINIC | Age: 28
End: 2023-10-16

## 2023-10-16 VITALS
HEIGHT: 64 IN | WEIGHT: 140 LBS | BODY MASS INDEX: 23.9 KG/M2 | SYSTOLIC BLOOD PRESSURE: 106 MMHG | HEART RATE: 74 BPM | DIASTOLIC BLOOD PRESSURE: 72 MMHG

## 2023-10-16 DIAGNOSIS — K59.00 CONSTIPATION DURING PREGNANCY IN SECOND TRIMESTER: ICD-10-CM

## 2023-10-16 DIAGNOSIS — D57.3 SICKLE CELL TRAIT (HCC): ICD-10-CM

## 2023-10-16 DIAGNOSIS — Z3A.28 28 WEEKS GESTATION OF PREGNANCY: ICD-10-CM

## 2023-10-16 DIAGNOSIS — O99.612 CONSTIPATION DURING PREGNANCY IN SECOND TRIMESTER: ICD-10-CM

## 2023-10-16 DIAGNOSIS — Z34.93 PRENATAL CARE IN THIRD TRIMESTER: Primary | ICD-10-CM

## 2023-10-16 DIAGNOSIS — O99.013 ANEMIA AFFECTING PREGNANCY IN THIRD TRIMESTER: ICD-10-CM

## 2023-10-16 DIAGNOSIS — Z23 ENCOUNTER FOR IMMUNIZATION: ICD-10-CM

## 2023-10-16 PROCEDURE — 90715 TDAP VACCINE 7 YRS/> IM: CPT

## 2023-10-16 PROCEDURE — PNV

## 2023-10-16 PROCEDURE — 90471 IMMUNIZATION ADMIN: CPT

## 2023-10-16 NOTE — PROGRESS NOTES
[employer] # [id number](Irish)  28 week education packet provided to patient on 10/16/23. Included in packet:   Third Trimester paperwork  Delivery consent   Birthing room support person rules and acknowledgment  Birth Plan   Welcome information  Birth certificate worksheet   Consent for Photographers  Perineal/ Vaginal massage   Pediatric practices and locations    Breast feeding / Has pump  TDAP given today   Declined Flu vaccine

## 2023-11-01 ENCOUNTER — HOSPITAL ENCOUNTER (OUTPATIENT)
Dept: INFUSION CENTER | Facility: HOSPITAL | Age: 28
Discharge: HOME | End: 2023-11-01
Payer: COMMERCIAL

## 2023-11-01 VITALS
OXYGEN SATURATION: 100 % | RESPIRATION RATE: 18 BRPM | SYSTOLIC BLOOD PRESSURE: 97 MMHG | TEMPERATURE: 97.8 F | HEART RATE: 72 BPM | DIASTOLIC BLOOD PRESSURE: 65 MMHG

## 2023-11-01 DIAGNOSIS — O99.012 ANEMIA AFFECTING PREGNANCY IN SECOND TRIMESTER: Primary | ICD-10-CM

## 2023-11-01 PROCEDURE — 96365 THER/PROPH/DIAG IV INF INIT: CPT

## 2023-11-01 RX ORDER — SODIUM CHLORIDE 9 MG/ML
20 INJECTION, SOLUTION INTRAVENOUS ONCE
Status: COMPLETED | OUTPATIENT
Start: 2023-11-01 | End: 2023-11-01

## 2023-11-01 RX ORDER — SODIUM CHLORIDE 9 MG/ML
20 INJECTION, SOLUTION INTRAVENOUS ONCE
Status: CANCELLED | OUTPATIENT
Start: 2023-11-08

## 2023-11-01 RX ADMIN — IRON SUCROSE 200 MG: 20 INJECTION, SOLUTION INTRAVENOUS at 09:39

## 2023-11-01 RX ADMIN — SODIUM CHLORIDE 20 ML/HR: 0.9 INJECTION, SOLUTION INTRAVENOUS at 09:39

## 2023-11-01 NOTE — PROGRESS NOTES
Pt tolerated first time venofer, #1/10 well without complications. Discussed any side effects at home to report to ordering dr. Neil Mendoza next appt, AVS provided.

## 2023-11-08 ENCOUNTER — HOSPITAL ENCOUNTER (OUTPATIENT)
Dept: INFUSION CENTER | Facility: HOSPITAL | Age: 28
Discharge: HOME | End: 2023-11-08
Payer: COMMERCIAL

## 2023-11-08 VITALS
DIASTOLIC BLOOD PRESSURE: 67 MMHG | RESPIRATION RATE: 20 BRPM | TEMPERATURE: 97.8 F | HEART RATE: 82 BPM | SYSTOLIC BLOOD PRESSURE: 98 MMHG

## 2023-11-08 DIAGNOSIS — O99.012 ANEMIA AFFECTING PREGNANCY IN SECOND TRIMESTER: Primary | ICD-10-CM

## 2023-11-08 PROCEDURE — 96365 THER/PROPH/DIAG IV INF INIT: CPT

## 2023-11-08 RX ORDER — SODIUM CHLORIDE 9 MG/ML
20 INJECTION, SOLUTION INTRAVENOUS ONCE
Status: CANCELLED | OUTPATIENT
Start: 2023-11-15

## 2023-11-08 RX ORDER — SODIUM CHLORIDE 9 MG/ML
20 INJECTION, SOLUTION INTRAVENOUS ONCE
Status: COMPLETED | OUTPATIENT
Start: 2023-11-08 | End: 2023-11-08

## 2023-11-08 RX ADMIN — IRON SUCROSE 200 MG: 20 INJECTION, SOLUTION INTRAVENOUS at 14:12

## 2023-11-08 RX ADMIN — SODIUM CHLORIDE 20 ML/HR: 0.9 INJECTION, SOLUTION INTRAVENOUS at 14:09

## 2023-11-13 PROBLEM — Z3A.32 32 WEEKS GESTATION OF PREGNANCY: Status: ACTIVE | Noted: 2023-05-28

## 2023-11-13 NOTE — PROGRESS NOTES
[address]   PRENATAL VISIT  Ashleigh Small  [id number]  1995        ASSESSMENT/PLAN:  Problem List       Sickle cell trait ([address])    Overview     Partner unable to be tested due to lack of insurance/cost of test.  - screen in [place] screens for sickle cell disease. Pediatrician should be advised. -Needs UCx each trimester  -2nd tri UCx negative   - 3rd tri Urine cx ordered         Kidney stones    Pain in female genitalia on intercourse    Overview     Patient experiences insertional and deep pain with intercourse and since she became sexually active. 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 32 weeks gestation of pregnancy    Overview     -Prenatal labs [x] , GBS bacteruria , all other labs wnl   -Gonorrhea/chlamydia screening [x]  -Cervical cancer screenin2020 NILM, Next due 2023   -Ultrasounds and aneuploidy screening:Level II completed 23 [x] wnl, no further US needed  -MSAFP screening- declined. Patient now has insurance but does not desire testing at this time. -28 week labs wnl except for anemia  -Delivery plan is . Analgesia plan is likely epidural. Infant feeding plan is breastfeeding  -Contraception plan: previously had paragard IUD and was displeased with irregular bleeding. Would like POPs with eventual transition to OCPs  -Vaccinations: s/p TDap at 28 wks (10/16)  -Delivery Consent: signed on 10/16/23  - RhoGAM not indicated           GBS bacteriuria    Overview     <10,000 GBS on initial OB urine culture.           Constipation during pregnancy in second trimester    Anemia affecting pregnancy    Overview     Hgb 9.9 on 10/9 with ferritin of 6  Venofer infusions                   If you are experiencing painful contractions, loss of fluids, vaginal bleeding, or decreased fetal movement, please call ask to speak to OBGYN doctor on call prior to proceeding to [facility] 2nd floor of [facility] [place] or [facility] 3rd floor of [facility]). We have a resident doctor on call at both hospitals 24 hours a day. Subjective: 25 y.o. [id number] 32w2d here for prenatal visit. She reports occasional contractions. She denies leakage of fluid and vaginal bleeding. She endorses good fetal movement. Discussed today:   labor precautions  Kick counts reviewed  Venofer reviewed - feeling much better  Constipation - colace refilled  She would like a letter saying that she is okay to have her teeth cleaned, dental note provided  FMLA paperwork given to office staff  Birth plan reviewed as well, she would like an epidural    Objective:  Pre- Vitals      Flowsheet Row Most Recent Value   Prenatal Assessment    Fetal Heart Rate 135   Fundal Height (cm) 33 cm   Prenatal Vitals    Blood Pressure 105/69   Weight - Scale 64.3 kg (141 lb 12.8 oz)   Urine Albumin/Glucose    Dilation/Effacement/Station    Vaginal Drainage    Edema              Pregnancy Plan:  Pregnancy: Smith     Delivery Plans  Planned delivery method: Vaginal  Planned anesthesia: None     Post-Delivery Plans  Feeding intentions: Breast Milk  Planned birth control: None      General: Well appearing, no distress  Respiratory: Unlabored breathing  Cardiovascular: Regular rate. Abdomen: Soft, gravid, nontender  Fundal Height: Appropriate for gestational age. Extremities: Warm and well perfused. Non tender. D/w Dr. Liana Castaneda.  [address], [address] [place] [address] Gynecology PGY-4  2023  3:25 PM

## 2023-11-14 ENCOUNTER — ROUTINE PRENATAL (OUTPATIENT)
Dept: OBGYN CLINIC | Facility: CLINIC | Age: 28
End: 2023-11-14

## 2023-11-14 VITALS
BODY MASS INDEX: 24.21 KG/M2 | HEART RATE: 85 BPM | SYSTOLIC BLOOD PRESSURE: 105 MMHG | WEIGHT: 141.8 LBS | DIASTOLIC BLOOD PRESSURE: 69 MMHG | HEIGHT: 64 IN | RESPIRATION RATE: 18 BRPM

## 2023-11-14 DIAGNOSIS — K59.00 CONSTIPATION DURING PREGNANCY IN SECOND TRIMESTER: ICD-10-CM

## 2023-11-14 DIAGNOSIS — O99.612 CONSTIPATION DURING PREGNANCY IN SECOND TRIMESTER: ICD-10-CM

## 2023-11-14 DIAGNOSIS — D57.3 SICKLE CELL TRAIT (HCC): Primary | ICD-10-CM

## 2023-11-14 PROCEDURE — 99213 OFFICE O/P EST LOW 20 MIN: CPT | Performed by: OBSTETRICS & GYNECOLOGY

## 2023-11-14 RX ORDER — DOCUSATE SODIUM 100 MG/1
100 CAPSULE, LIQUID FILLED ORAL 2 TIMES DAILY
Qty: 60 CAPSULE | Refills: 1 | Status: SHIPPED | OUTPATIENT
Start: 2023-11-14

## 2023-11-15 ENCOUNTER — HOSPITAL ENCOUNTER (OUTPATIENT)
Dept: INFUSION CENTER | Facility: HOSPITAL | Age: 28
Discharge: HOME | End: 2023-11-15
Payer: COMMERCIAL

## 2023-11-15 VITALS
RESPIRATION RATE: 18 BRPM | OXYGEN SATURATION: 99 % | TEMPERATURE: 97.8 F | SYSTOLIC BLOOD PRESSURE: 95 MMHG | DIASTOLIC BLOOD PRESSURE: 68 MMHG | HEART RATE: 80 BPM

## 2023-11-15 DIAGNOSIS — O99.012 ANEMIA AFFECTING PREGNANCY IN SECOND TRIMESTER: Primary | ICD-10-CM

## 2023-11-15 DIAGNOSIS — Z3A.32 32 WEEKS GESTATION OF PREGNANCY: ICD-10-CM

## 2023-11-15 PROCEDURE — 96365 THER/PROPH/DIAG IV INF INIT: CPT

## 2023-11-15 RX ORDER — SODIUM CHLORIDE 9 MG/ML
20 INJECTION, SOLUTION INTRAVENOUS ONCE
Status: CANCELLED | OUTPATIENT
Start: 2023-11-22

## 2023-11-15 RX ORDER — SODIUM CHLORIDE 9 MG/ML
20 INJECTION, SOLUTION INTRAVENOUS ONCE
Status: COMPLETED | OUTPATIENT
Start: 2023-11-15 | End: 2023-11-15

## 2023-11-15 RX ADMIN — IRON SUCROSE 200 MG: 20 INJECTION, SOLUTION INTRAVENOUS at 14:23

## 2023-11-15 RX ADMIN — SODIUM CHLORIDE 20 ML/HR: 0.9 INJECTION, SOLUTION INTRAVENOUS at 14:18

## 2023-11-15 NOTE — PROGRESS NOTES
Pt tolerated treatment today with no adverse reactions. Uses My Chart for apt reminders. Left unit ambulatory with a steady gait.

## 2023-11-22 ENCOUNTER — HOSPITAL ENCOUNTER (OUTPATIENT)
Dept: INFUSION CENTER | Facility: HOSPITAL | Age: 28
Discharge: HOME | End: 2023-11-22
Payer: COMMERCIAL

## 2023-11-22 VITALS
HEART RATE: 76 BPM | TEMPERATURE: 97.4 F | RESPIRATION RATE: 18 BRPM | DIASTOLIC BLOOD PRESSURE: 69 MMHG | SYSTOLIC BLOOD PRESSURE: 103 MMHG

## 2023-11-22 DIAGNOSIS — O99.012 ANEMIA AFFECTING PREGNANCY IN SECOND TRIMESTER: Primary | ICD-10-CM

## 2023-11-22 DIAGNOSIS — Z3A.32 32 WEEKS GESTATION OF PREGNANCY: ICD-10-CM

## 2023-11-22 PROCEDURE — 96365 THER/PROPH/DIAG IV INF INIT: CPT

## 2023-11-22 RX ORDER — SODIUM CHLORIDE 9 MG/ML
20 INJECTION, SOLUTION INTRAVENOUS ONCE
Status: CANCELLED | OUTPATIENT
Start: 2023-11-29

## 2023-11-22 RX ORDER — SODIUM CHLORIDE 9 MG/ML
20 INJECTION, SOLUTION INTRAVENOUS ONCE
Status: COMPLETED | OUTPATIENT
Start: 2023-11-22 | End: 2023-11-22

## 2023-11-22 RX ADMIN — IRON SUCROSE 200 MG: 20 INJECTION, SOLUTION INTRAVENOUS at 14:37

## 2023-11-22 RX ADMIN — SODIUM CHLORIDE 20 ML/HR: 9 INJECTION, SOLUTION INTRAVENOUS at 14:37

## 2023-11-27 ENCOUNTER — ROUTINE PRENATAL (OUTPATIENT)
Dept: OBGYN CLINIC | Facility: CLINIC | Age: 28
End: 2023-11-27

## 2023-11-27 VITALS
HEIGHT: 64 IN | DIASTOLIC BLOOD PRESSURE: 69 MMHG | HEART RATE: 87 BPM | SYSTOLIC BLOOD PRESSURE: 104 MMHG | BODY MASS INDEX: 25.03 KG/M2 | WEIGHT: 146.6 LBS

## 2023-11-27 DIAGNOSIS — Z34.93 PRENATAL CARE IN THIRD TRIMESTER: ICD-10-CM

## 2023-11-27 DIAGNOSIS — Z3A.34 34 WEEKS GESTATION OF PREGNANCY: Primary | ICD-10-CM

## 2023-11-27 PROCEDURE — PNV: Performed by: NURSE PRACTITIONER

## 2023-11-29 ENCOUNTER — HOSPITAL ENCOUNTER (OUTPATIENT)
Dept: INFUSION CENTER | Facility: HOSPITAL | Age: 28
Discharge: HOME | End: 2023-11-29
Payer: COMMERCIAL

## 2023-11-29 VITALS
RESPIRATION RATE: 18 BRPM | TEMPERATURE: 97.9 F | DIASTOLIC BLOOD PRESSURE: 69 MMHG | SYSTOLIC BLOOD PRESSURE: 101 MMHG | OXYGEN SATURATION: 99 % | HEART RATE: 77 BPM

## 2023-11-29 DIAGNOSIS — Z3A.34 34 WEEKS GESTATION OF PREGNANCY: ICD-10-CM

## 2023-11-29 DIAGNOSIS — O99.012 ANEMIA AFFECTING PREGNANCY IN SECOND TRIMESTER: Primary | ICD-10-CM

## 2023-11-29 PROCEDURE — 96365 THER/PROPH/DIAG IV INF INIT: CPT

## 2023-11-29 RX ORDER — SODIUM CHLORIDE 9 MG/ML
20 INJECTION, SOLUTION INTRAVENOUS ONCE
Status: CANCELLED | OUTPATIENT
Start: 2023-12-06

## 2023-11-29 RX ORDER — SODIUM CHLORIDE 9 MG/ML
20 INJECTION, SOLUTION INTRAVENOUS ONCE
Status: COMPLETED | OUTPATIENT
Start: 2023-11-29 | End: 2023-11-29

## 2023-11-29 RX ADMIN — SODIUM CHLORIDE 20 ML/HR: 9 INJECTION, SOLUTION INTRAVENOUS at 14:16

## 2023-11-29 RX ADMIN — IRON SUCROSE 200 MG: 20 INJECTION, SOLUTION INTRAVENOUS at 14:19

## 2023-12-06 DIAGNOSIS — O99.012 ANEMIA AFFECTING PREGNANCY IN SECOND TRIMESTER: ICD-10-CM

## 2023-12-06 DIAGNOSIS — Z3A.34 34 WEEKS GESTATION OF PREGNANCY: Primary | ICD-10-CM

## 2023-12-06 RX ORDER — SODIUM CHLORIDE 9 MG/ML
20 INJECTION, SOLUTION INTRAVENOUS ONCE
Status: CANCELLED | OUTPATIENT
Start: 2023-12-08

## 2023-12-08 ENCOUNTER — HOSPITAL ENCOUNTER (OUTPATIENT)
Dept: INFUSION CENTER | Facility: HOSPITAL | Age: 28
End: 2023-12-08
Payer: COMMERCIAL

## 2023-12-08 VITALS
OXYGEN SATURATION: 98 % | TEMPERATURE: 97.8 F | DIASTOLIC BLOOD PRESSURE: 73 MMHG | RESPIRATION RATE: 18 BRPM | SYSTOLIC BLOOD PRESSURE: 111 MMHG | HEART RATE: 84 BPM

## 2023-12-08 DIAGNOSIS — O99.012 ANEMIA AFFECTING PREGNANCY IN SECOND TRIMESTER: Primary | ICD-10-CM

## 2023-12-08 DIAGNOSIS — Z3A.34 34 WEEKS GESTATION OF PREGNANCY: ICD-10-CM

## 2023-12-08 PROCEDURE — 96365 THER/PROPH/DIAG IV INF INIT: CPT

## 2023-12-08 RX ORDER — SODIUM CHLORIDE 9 MG/ML
20 INJECTION, SOLUTION INTRAVENOUS ONCE
OUTPATIENT
Start: 2023-12-15

## 2023-12-08 RX ORDER — SODIUM CHLORIDE 9 MG/ML
20 INJECTION, SOLUTION INTRAVENOUS ONCE
Status: COMPLETED | OUTPATIENT
Start: 2023-12-08 | End: 2023-12-08

## 2023-12-08 RX ADMIN — IRON SUCROSE 200 MG: 20 INJECTION, SOLUTION INTRAVENOUS at 14:08

## 2023-12-08 RX ADMIN — SODIUM CHLORIDE 20 ML/HR: 0.9 INJECTION, SOLUTION INTRAVENOUS at 14:08

## 2023-12-08 NOTE — PROGRESS NOTES
Patient tolerated venofer without incident. Next appt confirmed, AVS declined. Patient left clinic ambulatory.

## 2023-12-12 PROBLEM — Z3A.36 36 WEEKS GESTATION OF PREGNANCY: Status: ACTIVE | Noted: 2023-05-28

## 2023-12-12 NOTE — PROGRESS NOTES
[facility] VISIT  Adrienne Salazar  [id number]  1995    [facility] [id number]    ASSESSMENT/PLAN:  Problem List       Sickle cell trait ([address])    Overview     Partner unable to be tested due to lack of insurance/cost of test.  -[place] screen in [place] screens for sickle cell disease. Pediatrician should be advised. -Needs UCx each trimester  -2nd tri UCx negative   - 3rd tri Urine cx ordered         Kidney stones    Pain in female genitalia on intercourse    Overview     Patient experiences insertional and deep pain with intercourse and since she became sexually active. 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           36 weeks gestation of pregnancy    Overview     Overview:  Labs  Pap smear NILM ; GC/CT negative  Prenatal panel complete  Blood type B+, rhogam not indicated   28w labs complete, anemia only   GBS culture not indicated, +GBS bacteriuria in early pregnancy   Vaccines:  Flu vaccine: declined 23  Covid vaccine: recommended   Tdap vaccine: received 10/16/23  Genetic screening: declined   Contraception: desires POPs  Feeding plan: Breastfeeding   Birth plan:  with likely epidural @ [facility]   Delivery consent:  signed at [address]:   Level II completed 23 wnl, no further US needed         Current Assessment & Plan     Overview:  Labs  Pap smear NILM ; GC/CT negative  Prenatal panel complete  Blood type B+, rhogam not indicated   28w labs complete, anemia only   GBS culture not indicated, +GBS bacteriuria in early pregnancy   Vaccines:  Flu vaccine: declined 23  Covid vaccine: recommended   Tdap vaccine: received 10/16/23  Genetic screening: declined   Contraception: desires POPs  Feeding plan: Breastfeeding   Birth plan:  with likely epidural @ [facility]   Delivery consent:  signed at [address]:   Level II completed 23 wnl, no further US needed         GBS bacteriuria    Overview     <10,000 GBS on initial OB urine culture. Constipation during pregnancy in second trimester    Anemia affecting pregnancy    Overview     Hgb 9.9 on 10/9 with ferritin of 6  Venofer infusions                   If you are experiencing painful contractions, loss of fluids, vaginal bleeding, or decreased fetal movement, please call ask to speak to OBGYN doctor on call prior to proceeding to Labor & Delivery [facility] 2nd floor of [address] [place] [address] or [place] 3rd floor of [facility]). We have a resident doctor on call at both hospitals 24 hours a day. Subjective: 25 y.o. [id number] 36w4d here for prenatal visit. She denies contractions. She denies leakage of fluid and vaginal bleeding. She endorses good fetal movement. 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 We discussed possibility for revision when she is done with childbearing with Dr. Ariel Mendoza at [address] [place] [address] of the sickle cell trait have a higher risk for developing a UTI / pyelonephritis in pregnancy. Recommend a urine cx q trimester   Position scan: VTX  Declined SVE today        Objective:  Pre- Vitals      Flowsheet Row Most Recent Value   Prenatal Assessment    Fetal Heart Rate 125   Fundal Height (cm) 35 cm   Movement Present   Presentation Vertex   Prenatal Vitals    Blood Pressure 94/65   Weight - Scale 66.4 kg (146 lb 6.4 oz)   Urine Albumin/Glucose    Dilation/Effacement/Station    Vaginal Drainage    Edema              Pregnancy Plan:  Pregnancy: Smith     Delivery Plans  Planned delivery method: Vaginal  Planned anesthesia: None     Post-Delivery Plans  Feeding intentions: Breast Milk  Planned birth control: None      General: Well appearing, no distress  Respiratory: Unlabored breathing  Cardiovascular: Regular rate.   Abdomen: Soft, gravid, nontender  Fundal Height: Appropriate for gestational age. Extremities: Warm and well perfused. Non tender. D/w Dr. Francis Guo.  Danielle, [facility] Gynecology PGY-4  12/13/2023  2:01 PM      12-14weeks: COVID vaccination visitor policy, genetic screening classes, ASA  16-18 weeks: sequential screening, level II ultrasound order, flu vaccine, order MSAFP,  26-28 weeks: 28 week labs (CBC, RPR, 1hr GTT), Rh status/rhogam, FKC, flu vaccine  28-32 weeks: delivery counseling, sign Ma31, tdap, flu vaccine  32 weeks: tdap, flu vaccine, check in card, rediscuss contraception, birth plan  36 weeks: collect GBS/PCN allergy, flu vaccine, SVE

## 2023-12-12 NOTE — ASSESSMENT & PLAN NOTE
Overview:  Labs  Pap smear NILM ; GC/CT negative  Prenatal panel complete  Blood type B+, rhogam not indicated   28w labs complete, anemia only   GBS culture not indicated, +GBS bacteriuria in early pregnancy   Vaccines:  Flu vaccine: declined 23  Covid vaccine: recommended   Tdap vaccine: received 10/16/23  Genetic screening: declined   Contraception: desires POPs  Feeding plan: Breastfeeding   Birth plan:  with [facility]   Delivery consent:  signed at [address]:   Level II completed 23 wnl, no further US needed

## 2023-12-13 ENCOUNTER — ROUTINE PRENATAL (OUTPATIENT)
Dept: OBGYN CLINIC | Facility: CLINIC | Age: 28
End: 2023-12-13

## 2023-12-13 VITALS
WEIGHT: 146.4 LBS | HEIGHT: 64 IN | HEART RATE: 87 BPM | SYSTOLIC BLOOD PRESSURE: 94 MMHG | RESPIRATION RATE: 18 BRPM | BODY MASS INDEX: 25 KG/M2 | DIASTOLIC BLOOD PRESSURE: 65 MMHG

## 2023-12-13 DIAGNOSIS — R82.71 GBS BACTERIURIA: ICD-10-CM

## 2023-12-13 DIAGNOSIS — D57.3 SICKLE CELL TRAIT (HCC): ICD-10-CM

## 2023-12-13 DIAGNOSIS — Z3A.36 36 WEEKS GESTATION OF PREGNANCY: Primary | ICD-10-CM

## 2023-12-13 PROCEDURE — 87086 URINE CULTURE/COLONY COUNT: CPT | Performed by: OBSTETRICS & GYNECOLOGY

## 2023-12-13 PROCEDURE — 87147 CULTURE TYPE IMMUNOLOGIC: CPT | Performed by: OBSTETRICS & GYNECOLOGY

## 2023-12-13 PROCEDURE — PNV: Performed by: OBSTETRICS & GYNECOLOGY

## 2023-12-13 NOTE — PATIENT INSTRUCTIONS
Female circumcision she does have pain with sex and is interested in a revision.  Will refer to Dr. Josh Crum at [facility]

## 2023-12-14 LAB — BACTERIA UR CULT: ABNORMAL

## 2023-12-15 ENCOUNTER — HOSPITAL ENCOUNTER (OUTPATIENT)
Dept: INFUSION CENTER | Facility: HOSPITAL | Age: 28
End: 2023-12-15
Payer: COMMERCIAL

## 2023-12-15 VITALS
TEMPERATURE: 97.8 F | SYSTOLIC BLOOD PRESSURE: 120 MMHG | HEART RATE: 93 BPM | RESPIRATION RATE: 18 BRPM | DIASTOLIC BLOOD PRESSURE: 77 MMHG

## 2023-12-15 DIAGNOSIS — Z3A.36 36 WEEKS GESTATION OF PREGNANCY: ICD-10-CM

## 2023-12-15 DIAGNOSIS — O99.012 ANEMIA AFFECTING PREGNANCY IN SECOND TRIMESTER: Primary | ICD-10-CM

## 2023-12-15 PROCEDURE — 96365 THER/PROPH/DIAG IV INF INIT: CPT

## 2023-12-15 RX ORDER — SODIUM CHLORIDE 9 MG/ML
20 INJECTION, SOLUTION INTRAVENOUS ONCE
Status: CANCELLED | OUTPATIENT
Start: 2023-12-22

## 2023-12-15 RX ORDER — SODIUM CHLORIDE 9 MG/ML
20 INJECTION, SOLUTION INTRAVENOUS ONCE
Status: COMPLETED | OUTPATIENT
Start: 2023-12-15 | End: 2023-12-15

## 2023-12-15 RX ADMIN — SODIUM CHLORIDE 20 ML/HR: 0.9 INJECTION, SOLUTION INTRAVENOUS at 14:31

## 2023-12-15 RX ADMIN — IRON SUCROSE 200 MG: 20 INJECTION, SOLUTION INTRAVENOUS at 14:31

## 2023-12-20 NOTE — PROGRESS NOTES
[facility]   PRENATAL VISIT  Mita Calzada  [id number]  1995        ASSESSMENT/PLAN:  Problem List       Sickle cell trait (HCC)    Overview     Partner unable to be tested due to lack of insurance/cost of test.  - screen in [place] screens for sickle cell disease. Pediatrician should be advised.   -Needs UCx each trimester  -2nd tri UCx negative   - 3rd tri Urine cx ordered         Kidney stones    Pain in female genitalia on intercourse    Overview     Patient experiences insertional and deep pain with intercourse and since she became sexually active.  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          37 weeks gestation of pregnancy    Overview     Overview:  Labs  Pap smear NILM ; GC/CT negative  Prenatal panel complete  Blood type B+, rhogam not indicated   28w labs complete, anemia only   GBS culture not indicated, +GBS bacteriuria in early pregnancy   Vaccines:  Flu vaccine: declined 23  Covid vaccine: recommended   Tdap vaccine: received 10/16/23  Genetic screening: declined   Contraception: desires POPs  Feeding plan: Breastfeeding   Birth plan:  with likely epidural @ [place]   Delivery consent:  signed at 28w   Ultrasounds:   Level II completed 23 wnl, no further US needed         GBS bacteriuria    Overview     <10,000 GBS on initial OB urine culture.          Constipation during pregnancy in second trimester    Anemia affecting pregnancy    Overview     Hgb 9.9 on 10/9 with ferritin of 6  Venofer infusions           Other Visit Diagnoses       Hemorrhoids, unspecified hemorrhoid type    -  Primary                  If you are experiencing painful contractions, loss of fluids, vaginal bleeding, or decreased fetal movement, please call ask to speak to OBGYN doctor on call prior to proceeding to Labor & Delivery ([address] floor of [facility] [place] or [address]). We have a resident doctor on  call at both hospitals 24 hours a day.     Subjective: 28 y.o.  37w4d here for prenatal visit. She denies contractions. She denies leakage of fluid and vaginal bleeding. She endorses good fetal movement.     Discussed today:  SVE closed/thick. Poorly tolerated  Desires spontaneous labor  Hemorrhoid - has been trying anusol and tucks pads. Managing constipation as well. She is interested in consulting with a surgeon for removal postpartum. Colorectal consult placed  RTO in 1 week for PNV    Objective:  Pre-Lu[place] Vitals      Flowsheet Row Most Recent Value   Prenatal Assessment    Fetal Heart Rate 140   Movement Present   Prenatal Vitals    Blood Pressure 119/82   Weight - Scale 67.2 kg (148 lb 3.2 oz)   Urine Albumin/Glucose    Dilation/Effacement/Station    Vaginal Drainage    Edema              Pregnancy Plan:  Pregnancy: Smith  Fetal sex: Female     Delivery Plans  Planned delivery method: Vaginal  Planned anesthesia: None     Post-Delivery Plans  Feeding intentions: Breast Milk  Planned birth control: None      General: Well appearing, no distress  Respiratory: Unlabored breathing  Cardiovascular: Regular rate.  Abdomen: Soft, gravid, nontender  Fundal Height: Appropriate for gestational age.  Extremities: Warm and well perfused.  Non tender.        D/w Dr. Giuliano Santos, DO  Obstetrics & Gynecology PGY-4  2023  9:56 AM

## 2023-12-22 ENCOUNTER — ROUTINE PRENATAL (OUTPATIENT)
Dept: OBGYN CLINIC | Facility: CLINIC | Age: 28
End: 2023-12-22

## 2023-12-22 ENCOUNTER — HOSPITAL ENCOUNTER (OUTPATIENT)
Dept: INFUSION CENTER | Facility: HOSPITAL | Age: 28
End: 2023-12-22
Payer: COMMERCIAL

## 2023-12-22 VITALS
HEART RATE: 80 BPM | HEIGHT: 64 IN | SYSTOLIC BLOOD PRESSURE: 119 MMHG | RESPIRATION RATE: 18 BRPM | WEIGHT: 148.2 LBS | DIASTOLIC BLOOD PRESSURE: 82 MMHG | BODY MASS INDEX: 25.3 KG/M2

## 2023-12-22 VITALS
RESPIRATION RATE: 18 BRPM | TEMPERATURE: 97.8 F | HEART RATE: 84 BPM | DIASTOLIC BLOOD PRESSURE: 70 MMHG | OXYGEN SATURATION: 98 % | SYSTOLIC BLOOD PRESSURE: 102 MMHG

## 2023-12-22 DIAGNOSIS — O99.012 ANEMIA AFFECTING PREGNANCY IN SECOND TRIMESTER: Primary | ICD-10-CM

## 2023-12-22 DIAGNOSIS — K64.9 HEMORRHOIDS, UNSPECIFIED HEMORRHOID TYPE: Primary | ICD-10-CM

## 2023-12-22 DIAGNOSIS — Z3A.37 37 WEEKS GESTATION OF PREGNANCY: ICD-10-CM

## 2023-12-22 RX ORDER — SODIUM CHLORIDE 9 MG/ML
20 INJECTION, SOLUTION INTRAVENOUS ONCE
Status: COMPLETED | OUTPATIENT
Start: 2023-12-22 | End: 2023-12-22

## 2023-12-22 RX ORDER — SODIUM CHLORIDE 9 MG/ML
20 INJECTION, SOLUTION INTRAVENOUS ONCE
Status: CANCELLED | OUTPATIENT
Start: 2023-12-29

## 2023-12-22 RX ADMIN — IRON SUCROSE 200 MG: 20 INJECTION, SOLUTION INTRAVENOUS at 14:32

## 2023-12-22 RX ADMIN — SODIUM CHLORIDE 20 ML/HR: 0.9 INJECTION, SOLUTION INTRAVENOUS at 14:26

## 2023-12-22 NOTE — PROGRESS NOTES
Pt tolerated treatment today with no adverse reactions. Declined AVS, Next apt comfirmed. Left unit ambulatory with a steady gait.

## 2023-12-29 ENCOUNTER — ROUTINE PRENATAL (OUTPATIENT)
Dept: OBGYN CLINIC | Facility: CLINIC | Age: 28
End: 2023-12-29

## 2023-12-29 ENCOUNTER — HOSPITAL ENCOUNTER (OUTPATIENT)
Dept: INFUSION CENTER | Facility: HOSPITAL | Age: 28
End: 2023-12-29
Payer: COMMERCIAL

## 2023-12-29 VITALS
HEART RATE: 147 BPM | WEIGHT: 146.6 LBS | BODY MASS INDEX: 25.03 KG/M2 | SYSTOLIC BLOOD PRESSURE: 123 MMHG | DIASTOLIC BLOOD PRESSURE: 76 MMHG | HEIGHT: 64 IN

## 2023-12-29 VITALS
OXYGEN SATURATION: 99 % | SYSTOLIC BLOOD PRESSURE: 111 MMHG | RESPIRATION RATE: 18 BRPM | HEART RATE: 82 BPM | TEMPERATURE: 97.8 F | DIASTOLIC BLOOD PRESSURE: 71 MMHG

## 2023-12-29 DIAGNOSIS — Z3A.38 38 WEEKS GESTATION OF PREGNANCY: ICD-10-CM

## 2023-12-29 DIAGNOSIS — Z3A.38 38 WEEKS GESTATION OF PREGNANCY: Primary | ICD-10-CM

## 2023-12-29 DIAGNOSIS — Z34.93 PRENATAL CARE IN THIRD TRIMESTER: ICD-10-CM

## 2023-12-29 DIAGNOSIS — O99.012 ANEMIA AFFECTING PREGNANCY IN SECOND TRIMESTER: Primary | ICD-10-CM

## 2023-12-29 PROCEDURE — 96365 THER/PROPH/DIAG IV INF INIT: CPT

## 2023-12-29 RX ORDER — SODIUM CHLORIDE 9 MG/ML
20 INJECTION, SOLUTION INTRAVENOUS ONCE
Status: CANCELLED | OUTPATIENT
Start: 2024-01-05

## 2023-12-29 RX ORDER — SODIUM CHLORIDE 9 MG/ML
20 INJECTION, SOLUTION INTRAVENOUS ONCE
Status: COMPLETED | OUTPATIENT
Start: 2023-12-29 | End: 2023-12-29

## 2023-12-29 RX ADMIN — SODIUM CHLORIDE 20 ML/HR: 0.9 INJECTION, SOLUTION INTRAVENOUS at 14:14

## 2023-12-29 RX ADMIN — IRON SUCROSE 200 MG: 20 INJECTION, SOLUTION INTRAVENOUS at 14:18

## 2023-12-29 NOTE — PATIENT INSTRUCTIONS
LABOR PRECAUTIONS  Call our office for any of the following:    * I need to call immediately I if I have even a small amount of LIQUID  leaking from my vagina, with or without contractions.   * I need to call if I am BLEEDING an amount equal to or more than a period.  A small amount of bloody vaginal discharge is normal at the end of the pregnancy.   * I need to call if I am having CONTRACTIONS  every five minutes for at least an hour.  I will need a watch in order to time them.  I should time them from the beginning of one contraction until the beginning of the next one.   * I need to call BEFORE  I go to the hospital.   * I need to have a plan for TRANSPORTATION  to get to the hospital when I am in labor.  Labor is generally not an emergency which requires an ambulance.          FETAL KICK COUNTS    In the third trimester (after 28 weeks gestation) you should be performing fetal kick counts every day.  Your baby should move at least 10 times in 2 hours during an active time, once a day.    Choose atime of day when your baby is most active.  Try to do this around the same time each day.  Get into a comfortable position and then write down the time your baby first moves.  Count each movement until the baby moves 10 times.  These movements include kicks, punches, nudges, flutters, or rolls.  This can take anywhere from 5 minutes to 2 hours.  Write down the time you feel the baby's 10th movement.    If 2 hours has passed and your baby has not moved at least 10 times, you should CALL THE OFFICE RIGHT AWAY.          PERINEAL / VAGINAL MASSAGE    What can I do now to decrease my chances of tearing during delivery?  Massaging around the vaginal opening by you (or your partner), either antepartum (before birth) or during the second stage of labor, can reduce the likelihood of perineal tearing during childbirth.  Likewise, the use of warm packs held on the perineum during the pushing stage of labor can reduce the severity  of your tear.  This will happen during the pushing stage of labor.  At home, you can also help reduce the chances of injury that may occur during the birth of your child through perineal massage.    When should I do this?  Starting around or shortly after 34 weeks of pregnancy, you or your partner should provide 5-10 minutes of vaginal massage 1-4 times per week.    How?  Use either almond, coconut, or olive oil and water mixture on 1 or 2 fingers (depending on comfort).  Insert finger(s) 3-5cm into the vagina.  Apply sweeping downward/sideward pressure from 3 to 9 o'clock for 5-10 minutes, 1-4 times per week.        GROUP B STREP    Group B Strep (GBS) is a common vaginal bacteria.  Approximately 25% of women normally have GBS bacteria present in the vagina.  It is NOT a sexually-transmitted infection.  In fact, it is not an infection AT ALL!  It is just a normal vaginal bacteria for many women.    However, the GBS bacteria can be dangerous to a  baby if the baby is exposed to that particular bacteria during labor and birth AND develops an infection from it.  The likelihood of a  GBS infection for a woman who has GBS bacteria in the vagina is about 1%-2%.  But if it does occur, a baby could become severely ill.    For this reason, we do a vaginal culture (Q-tip swab of the vagina and rectum) for ALL pregnant women at approximately 36 weeks of pregnancy.  If the culture shows that there is GBS bacteria present, it is NOT a reason to panic!  Because in this situation we will give this woman antibiotics through her IV while she is in labor.  When a mother is treated with antibiotics during labor and delivery, her baby ALMOST NEVER becomes infected with GBS bacteria.

## 2023-12-29 NOTE — PROGRESS NOTES
Pt tolerated venofer infusion without difficulty.  No s/s reaction noted.  Next appt confirmed.  AVS declined.  Left ambulatory in stable condition.

## 2023-12-29 NOTE — PROGRESS NOTES
Mita Calzada presents today for routine OB visit at 38w6d. She is a  with a history of two full term .     Blood Pressure: 123/76  Wt=66.5 kg (146 lb 9.6 oz); Body mass index is 25.16 kg/m².; TWG=9.616 kg (21 lb 3.2 oz)  Fetal Heart Rate: 140; Fundal Height (cm): 37 cm  SVE today: Cervical Dilation: Closed /Cervical Effacement: 0 /Fetal Station: -3  Abdomen: gravid, soft, non-tender.  She reports occasional contractions.  Denies regular uterine contractions.  Denies vaginal bleeding or leaking of fluid.  Reports adequate fetal movement of at least 10 movements in 2 hours once daily.    Reviewed labor precautions and fetal kick counts as well as pre-eclampsia warning signs.  Reviewed perineal massage for decreasing risk of perineal lacerations during delivery.  Advised to continue medications and return in 1 week.      Current Outpatient Medications   Medication Instructions    docusate sodium (COLACE) 100 mg, Oral, 2 times daily    Prenatal Vit-Fe Fumarate-FA (PRENATAL PO) Take by mouth         Pregnancy Problems (from 23 to present)       Problem Noted Resolved    Anemia affecting pregnancy 10/9/2023 by Missy Barnett MD No    Overview Addendum 2023  2:35 PM by Morenita Santos DO     Hgb 9.9 on 10/9 with ferritin of 6  Venofer infusions          GBS bacteriuria 2023 by RYAN Masters No    Overview Signed 2023  8:47 AM by RYAN Masters     <10,000 GBS on initial OB urine culture.          38 weeks gestation of pregnancy 2023 by RYAN Romo No    Overview Addendum 2023  3:46 PM by RYAN Masters     Overview:  Labs  Pap smear NILM ; GC/CT negative  Prenatal panel complete  Blood type B+, rhogam not indicated   28w labs complete, anemia only   GBS culture not indicated, +GBS bacteriuria in early pregnancy   Vaccines:  Flu vaccine: declined 23  Covid vaccine: recommended   Tdap vaccine: received 10/16/23  Genetic screening:  declined   Contraception: desires POPs  Feeding plan: Breastfeeding   Birth plan:  with likely epidural @ [place]   Delivery consent:  signed at 28w   Ultrasounds:   Level II completed 23 wnl, no further US needed         Sickle cell trait (HCC) 2020 by Adenike Parmar MD No    Overview Addendum 2023  3:25 PM by Morenita Santos,      Partner unable to be tested due to lack of insurance/cost of test.  - screen in [place] screens for sickle cell disease. Pediatrician should be advised.   -Needs UCx each trimester  -2nd tri UCx negative   - 3rd tri Urine cx ordered

## 2023-12-29 NOTE — PLAN OF CARE
Problem: Potential for Falls  Goal: Patient will remain free of falls  Description: INTERVENTIONS:  - Educate patient/family on patient safety including physical limitations  - Instruct patient to call for assistance with activity   - Consult OT/PT to assist with strengthening/mobility   - Keep Call bell within reach  - Keep bed low and locked with side rails adjusted as appropriate  - Keep care items and personal belongings within reach  - Initiate and maintain comfort rounds  - Make Fall Risk Sign visible to staff  - Offer Toileting every  Hours, in advance of need  - Initiate/Maintain alarm  - Obtain necessary fall risk management equipment:   - Apply yellow socks and bracelet for high fall risk patients  - Consider moving patient to room near nurses station  Outcome: Progressing     Problem: Knowledge Deficit  Goal: Patient/family/caregiver demonstrates understanding of disease process, treatment plan, medications, and discharge instructions  Description: Complete learning assessment and assess knowledge base.  Interventions:  - Provide teaching at level of understanding  - Provide teaching via preferred learning methods  Outcome: Progressing

## 2024-01-05 ENCOUNTER — HOSPITAL ENCOUNTER (OUTPATIENT)
Dept: INFUSION CENTER | Facility: HOSPITAL | Age: 29
End: 2024-01-05
Payer: COMMERCIAL

## 2024-01-05 ENCOUNTER — ROUTINE PRENATAL (OUTPATIENT)
Dept: OBGYN CLINIC | Facility: CLINIC | Age: 29
End: 2024-01-05

## 2024-01-05 VITALS
WEIGHT: 147.8 LBS | DIASTOLIC BLOOD PRESSURE: 85 MMHG | HEART RATE: 91 BPM | SYSTOLIC BLOOD PRESSURE: 123 MMHG | HEIGHT: 64 IN | RESPIRATION RATE: 18 BRPM | BODY MASS INDEX: 25.23 KG/M2

## 2024-01-05 VITALS
RESPIRATION RATE: 18 BRPM | HEART RATE: 82 BPM | SYSTOLIC BLOOD PRESSURE: 103 MMHG | TEMPERATURE: 97.3 F | DIASTOLIC BLOOD PRESSURE: 73 MMHG

## 2024-01-05 DIAGNOSIS — Z34.93 PRENATAL CARE IN THIRD TRIMESTER: Primary | ICD-10-CM

## 2024-01-05 DIAGNOSIS — D50.9 IRON DEFICIENCY ANEMIA: ICD-10-CM

## 2024-01-05 DIAGNOSIS — Z3A.39 39 WEEKS GESTATION OF PREGNANCY: ICD-10-CM

## 2024-01-05 DIAGNOSIS — D50.9 IRON DEFICIENCY ANEMIA, UNSPECIFIED IRON DEFICIENCY ANEMIA TYPE: ICD-10-CM

## 2024-01-05 DIAGNOSIS — D50.9 IRON DEFICIENCY ANEMIA, UNSPECIFIED IRON DEFICIENCY ANEMIA TYPE: Primary | ICD-10-CM

## 2024-01-05 DIAGNOSIS — R82.71 GBS BACTERIURIA: ICD-10-CM

## 2024-01-05 PROCEDURE — PNV: Performed by: OBSTETRICS & GYNECOLOGY

## 2024-01-05 PROCEDURE — 96365 THER/PROPH/DIAG IV INF INIT: CPT

## 2024-01-05 RX ORDER — SODIUM CHLORIDE 9 MG/ML
20 INJECTION, SOLUTION INTRAVENOUS ONCE
Status: COMPLETED | OUTPATIENT
Start: 2024-01-05 | End: 2024-01-05

## 2024-01-05 RX ORDER — SODIUM CHLORIDE 9 MG/ML
20 INJECTION, SOLUTION INTRAVENOUS ONCE
Status: CANCELLED | OUTPATIENT
Start: 2024-01-05

## 2024-01-05 RX ADMIN — IRON SUCROSE 200 MG: 20 INJECTION, SOLUTION INTRAVENOUS at 14:23

## 2024-01-05 RX ADMIN — SODIUM CHLORIDE 20 ML/HR: 9 INJECTION, SOLUTION INTRAVENOUS at 14:23

## 2024-01-05 NOTE — ASSESSMENT & PLAN NOTE
Progestin only pills for postpartum contraception  Labor precautions discussed  Declined induction at this time; hoping for natural labor  If undelivered by time of next appointment in 1 week, will do NST and ABIOLA  IOL recommended if undelivered at 41w

## 2024-01-05 NOTE — PROGRESS NOTES
[facility] Obstetric Visit    Mita Calzada is a  who presents today for routine OB visit at 39w6d.    S:  She reports non complaints today.    Denies uterine contractions.  Denies vaginal bleeding or leaking of fluid.  Reports adequate fetal movement daily.    O:  Blood Pressure: 123/85  Wt=67 kg (147 lb 12.8 oz); Body mass index is 25.37 kg/m².; TWG= 10.2 kg (22 lb 6.4 oz)  Fetal Heart Rate: 143; Fundal Height (cm): 37 cm    Pulm: Non-labored breathing.  Abdomen: gravid, soft, non-tender.  SVE today: unable to tolerate      Problem List Items Addressed This Visit          Other    39 weeks gestation of pregnancy     Progestin only pills for postpartum contraception  Labor precautions discussed  Declined induction at this time; hoping for natural labor  If undelivered by time of next appointment in 1 week, will do NST and ABIOLA  IOL recommended if undelivered at 41w           GBS bacteriuria     To be treated with PCN intrapartum         Iron deficiency anemia     Completing parenteral iron infusions          Other Visit Diagnoses       Prenatal care in third trimester    -  Primary            Patient discussed with Dr. Nobles.    Rahel Jimenez MD  PGY-IV, OB/GYN  2024, 11:50 AM

## 2024-01-05 NOTE — PLAN OF CARE
Problem: Potential for Falls  Goal: Patient will remain free of falls  Description: INTERVENTIONS:  - Educate patient/family on patient safety including physical limitations  - Instruct patient to call for assistance with activity   - Consult OT/PT to assist with strengthening/mobility   - Apply yellow socks and bracelet for high fall risk patients  - Consider moving patient to room near nurses station  Outcome: Progressing

## 2024-01-05 NOTE — PROGRESS NOTES
Mita Calzada  tolerated IV venofer treatment well with no complications.        IV venofer therapy plan complete. No further appointments. AVS declined.

## 2024-01-06 ENCOUNTER — HOSPITAL ENCOUNTER (INPATIENT)
Facility: HOSPITAL | Age: 29
LOS: 2 days | Discharge: HOME | End: 2024-01-08
Attending: OBSTETRICS & GYNECOLOGY | Admitting: STUDENT IN AN ORGANIZED HEALTH CARE EDUCATION/TRAINING PROGRAM
Payer: COMMERCIAL

## 2024-01-06 ENCOUNTER — ANESTHESIA EVENT (INPATIENT)
Dept: ANESTHESIOLOGY | Facility: HOSPITAL | Age: 29
End: 2024-01-06
Payer: COMMERCIAL

## 2024-01-06 ENCOUNTER — NURSE TRIAGE (OUTPATIENT)
Dept: OTHER | Facility: OTHER | Age: 29
End: 2024-01-06

## 2024-01-06 ENCOUNTER — ANESTHESIA (INPATIENT)
Dept: ANESTHESIOLOGY | Facility: HOSPITAL | Age: 29
End: 2024-01-06
Payer: COMMERCIAL

## 2024-01-06 DIAGNOSIS — Z3A.39 39 WEEKS GESTATION OF PREGNANCY: Primary | ICD-10-CM

## 2024-01-06 PROBLEM — Z3A.40 40 WEEKS GESTATION OF PREGNANCY: Status: ACTIVE | Noted: 2023-05-28

## 2024-01-06 LAB
ABO GROUP BLD: NORMAL
BASE EXCESS BLDCOA CALC-SCNC: -3.5 MMOL/L (ref 3–11)
BASE EXCESS BLDCOV CALC-SCNC: -1.6 MMOL/L (ref 1–9)
BLD GP AB SCN SERPL QL: NEGATIVE
ERYTHROCYTE [DISTWIDTH] IN BLOOD BY AUTOMATED COUNT: 18.8 % (ref 11.6–15.1)
HCO3 BLDCOA-SCNC: 24.4 MMOL/L (ref 17.3–27.3)
HCO3 BLDCOV-SCNC: 23.7 MMOL/L (ref 12.2–28.6)
HCT VFR BLD AUTO: 39.5 % (ref 34.8–46.1)
HGB BLD-MCNC: 13.4 G/DL (ref 11.5–15.4)
HOLD SPECIMEN: NORMAL
MCH RBC QN AUTO: 26.1 PG (ref 26.8–34.3)
MCHC RBC AUTO-ENTMCNC: 33.9 G/DL (ref 31.4–37.4)
MCV RBC AUTO: 77 FL (ref 82–98)
O2 CT VFR BLDCOA CALC: 7.4 ML/DL
OXYHGB MFR BLDCOA: 33.6 %
OXYHGB MFR BLDCOV: 66 %
PCO2 BLDCOA: 55.2 MM[HG] (ref 30–60)
PCO2 BLDCOV: 42.3 MM HG (ref 27–43)
PH BLDCOA: 7.26 [PH] (ref 7.23–7.43)
PH BLDCOV: 7.37 [PH] (ref 7.19–7.49)
PLATELET # BLD AUTO: 166 THOUSANDS/UL (ref 149–390)
PMV BLD AUTO: 11.4 FL (ref 8.9–12.7)
PO2 BLDCOA: 17.7 MM HG (ref 5–25)
PO2 BLDCOV: 29.1 MM HG (ref 15–45)
RBC # BLD AUTO: 5.14 MILLION/UL (ref 3.81–5.12)
RH BLD: POSITIVE
SAO2 % BLDCOV: 15.1 ML/DL
SPECIMEN EXPIRATION DATE: NORMAL
WBC # BLD AUTO: 7.58 THOUSAND/UL (ref 4.31–10.16)

## 2024-01-06 PROCEDURE — 86803 HEPATITIS C AB TEST: CPT | Performed by: OBSTETRICS & GYNECOLOGY

## 2024-01-06 PROCEDURE — 85027 COMPLETE CBC AUTOMATED: CPT | Performed by: OBSTETRICS & GYNECOLOGY

## 2024-01-06 PROCEDURE — 10907ZC DRAINAGE OF AMNIOTIC FLUID, THERAPEUTIC FROM PRODUCTS OF CONCEPTION, VIA NATURAL OR ARTIFICIAL OPENING: ICD-10-PCS | Performed by: STUDENT IN AN ORGANIZED HEALTH CARE EDUCATION/TRAINING PROGRAM

## 2024-01-06 PROCEDURE — 0KQM0ZZ REPAIR PERINEUM MUSCLE, OPEN APPROACH: ICD-10-PCS | Performed by: STUDENT IN AN ORGANIZED HEALTH CARE EDUCATION/TRAINING PROGRAM

## 2024-01-06 PROCEDURE — 59400 OBSTETRICAL CARE: CPT | Performed by: STUDENT IN AN ORGANIZED HEALTH CARE EDUCATION/TRAINING PROGRAM

## 2024-01-06 PROCEDURE — 86780 TREPONEMA PALLIDUM: CPT | Performed by: OBSTETRICS & GYNECOLOGY

## 2024-01-06 PROCEDURE — 86850 RBC ANTIBODY SCREEN: CPT | Performed by: OBSTETRICS & GYNECOLOGY

## 2024-01-06 PROCEDURE — 82805 BLOOD GASES W/O2 SATURATION: CPT | Performed by: STUDENT IN AN ORGANIZED HEALTH CARE EDUCATION/TRAINING PROGRAM

## 2024-01-06 PROCEDURE — 86901 BLOOD TYPING SEROLOGIC RH(D): CPT | Performed by: OBSTETRICS & GYNECOLOGY

## 2024-01-06 PROCEDURE — 99213 OFFICE O/P EST LOW 20 MIN: CPT

## 2024-01-06 PROCEDURE — G0463 HOSPITAL OUTPT CLINIC VISIT: HCPCS

## 2024-01-06 PROCEDURE — 86900 BLOOD TYPING SEROLOGIC ABO: CPT | Performed by: OBSTETRICS & GYNECOLOGY

## 2024-01-06 PROCEDURE — NC001 PR NO CHARGE: Performed by: STUDENT IN AN ORGANIZED HEALTH CARE EDUCATION/TRAINING PROGRAM

## 2024-01-06 RX ORDER — IBUPROFEN 600 MG/1
600 TABLET ORAL EVERY 6 HOURS
Status: DISCONTINUED | OUTPATIENT
Start: 2024-01-06 | End: 2024-01-08 | Stop reason: HOSPADM

## 2024-01-06 RX ORDER — OXYTOCIN/RINGER'S LACTATE 30/500 ML
1-30 PLASTIC BAG, INJECTION (ML) INTRAVENOUS
Status: DISCONTINUED | OUTPATIENT
Start: 2024-01-06 | End: 2024-01-06

## 2024-01-06 RX ORDER — BENZOCAINE/MENTHOL 6 MG-10 MG
1 LOZENGE MUCOUS MEMBRANE DAILY PRN
Status: DISCONTINUED | OUTPATIENT
Start: 2024-01-06 | End: 2024-01-08 | Stop reason: HOSPADM

## 2024-01-06 RX ORDER — ACETAMINOPHEN 325 MG/1
650 TABLET ORAL EVERY 4 HOURS PRN
Status: DISCONTINUED | OUTPATIENT
Start: 2024-01-06 | End: 2024-01-08 | Stop reason: HOSPADM

## 2024-01-06 RX ORDER — DOCUSATE SODIUM 100 MG/1
100 CAPSULE, LIQUID FILLED ORAL 2 TIMES DAILY
Status: DISCONTINUED | OUTPATIENT
Start: 2024-01-06 | End: 2024-01-08 | Stop reason: HOSPADM

## 2024-01-06 RX ORDER — OXYTOCIN/RINGER'S LACTATE 30/500 ML
250 PLASTIC BAG, INJECTION (ML) INTRAVENOUS
Status: ACTIVE | OUTPATIENT
Start: 2024-01-06 | End: 2024-01-06

## 2024-01-06 RX ORDER — CALCIUM CARBONATE 500 MG/1
1000 TABLET, CHEWABLE ORAL DAILY PRN
Status: DISCONTINUED | OUTPATIENT
Start: 2024-01-06 | End: 2024-01-08 | Stop reason: HOSPADM

## 2024-01-06 RX ORDER — ONDANSETRON 2 MG/ML
4 INJECTION INTRAMUSCULAR; INTRAVENOUS EVERY 8 HOURS PRN
Status: DISCONTINUED | OUTPATIENT
Start: 2024-01-06 | End: 2024-01-08 | Stop reason: HOSPADM

## 2024-01-06 RX ORDER — BUPIVACAINE HYDROCHLORIDE 2.5 MG/ML
30 INJECTION, SOLUTION EPIDURAL; INFILTRATION; INTRACAUDAL ONCE AS NEEDED
Status: DISCONTINUED | OUTPATIENT
Start: 2024-01-06 | End: 2024-01-06

## 2024-01-06 RX ORDER — SODIUM CHLORIDE, SODIUM LACTATE, POTASSIUM CHLORIDE, CALCIUM CHLORIDE 600; 310; 30; 20 MG/100ML; MG/100ML; MG/100ML; MG/100ML
125 INJECTION, SOLUTION INTRAVENOUS CONTINUOUS
Status: DISCONTINUED | OUTPATIENT
Start: 2024-01-06 | End: 2024-01-06

## 2024-01-06 RX ORDER — LIDOCAINE HYDROCHLORIDE AND EPINEPHRINE 15; 5 MG/ML; UG/ML
INJECTION, SOLUTION EPIDURAL
Status: COMPLETED | OUTPATIENT
Start: 2024-01-06 | End: 2024-01-06

## 2024-01-06 RX ORDER — OXYTOCIN/RINGER'S LACTATE 30/500 ML
250 PLASTIC BAG, INJECTION (ML) INTRAVENOUS ONCE
Status: DISCONTINUED | OUTPATIENT
Start: 2024-01-06 | End: 2024-01-08 | Stop reason: HOSPADM

## 2024-01-06 RX ADMIN — LIDOCAINE HYDROCHLORIDE AND EPINEPHRINE 3 ML: 15; 5 INJECTION, SOLUTION EPIDURAL at 15:58

## 2024-01-06 RX ADMIN — LIDOCAINE HYDROCHLORIDE AND EPINEPHRINE 2 ML: 15; 5 INJECTION, SOLUTION EPIDURAL at 16:01

## 2024-01-06 RX ADMIN — PENICILLIN G POTASSIUM 2.5 MILLION UNITS: 20000000 INJECTION, POWDER, FOR SOLUTION INTRAVENOUS at 18:39

## 2024-01-06 RX ADMIN — ROPIVACAINE HYDROCHLORIDE: 2 INJECTION, SOLUTION EPIDURAL; INFILTRATION at 16:13

## 2024-01-06 RX ADMIN — PENICILLIN G POTASSIUM 5 MILLION UNITS: 20000000 INJECTION, POWDER, FOR SOLUTION INTRAVENOUS at 13:47

## 2024-01-06 RX ADMIN — HYDROCORTISONE 1 APPLICATION: 1 CREAM TOPICAL at 22:39

## 2024-01-06 RX ADMIN — IBUPROFEN 600 MG: 600 TABLET ORAL at 22:38

## 2024-01-06 RX ADMIN — SODIUM CHLORIDE, SODIUM LACTATE, POTASSIUM CHLORIDE, AND CALCIUM CHLORIDE 125 ML/HR: .6; .31; .03; .02 INJECTION, SOLUTION INTRAVENOUS at 15:51

## 2024-01-06 RX ADMIN — Medication 2 MILLI-UNITS/MIN: at 16:44

## 2024-01-06 RX ADMIN — SODIUM CHLORIDE, SODIUM LACTATE, POTASSIUM CHLORIDE, AND CALCIUM CHLORIDE 125 ML/HR: .6; .31; .03; .02 INJECTION, SOLUTION INTRAVENOUS at 13:44

## 2024-01-06 RX ADMIN — BENZOCAINE AND LEVOMENTHOL 1 APPLICATION: 200; 5 SPRAY TOPICAL at 22:39

## 2024-01-06 RX ADMIN — WITCH HAZEL 1 PAD: 500 SOLUTION RECTAL; TOPICAL at 22:39

## 2024-01-06 RX ADMIN — DOCUSATE SODIUM 100 MG: 100 CAPSULE, LIQUID FILLED ORAL at 22:39

## 2024-01-06 RX ADMIN — SODIUM CHLORIDE, SODIUM LACTATE, POTASSIUM CHLORIDE, AND CALCIUM CHLORIDE 999 ML/HR: .6; .31; .03; .02 INJECTION, SOLUTION INTRAVENOUS at 18:27

## 2024-01-06 NOTE — TELEPHONE ENCOUNTER
"Answer Assessment - Initial Assessment Questions  1. ONSET: \"When did the symptoms begin?\"         Last night   2. CONTRACTIONS: \"Describe the contractions that you are having.\" (e.g., duration, frequency, regularity, severity)      Every 5 min   3. ÁNGEL: \"What date are you expecting to deliver?\"      Today   4. PARITY: \"Have you had a baby before?\" If Yes, ask: \"How long did the labor last?\"      3rd pregnany     6. OTHER SYMPTOMS: \"Do you have any other symptoms?\" (e.g., leaking fluid from vagina, vaginal bleeding, fever, hand/facial swelling)      no    Protocols used: Pregnancy - Labor-ADULT-AH    " No

## 2024-01-06 NOTE — ANESTHESIA PREPROCEDURE EVALUATION
Procedure:  LABOR ANALGESIA    Relevant Problems   /RENAL   (+) Kidney stones      GYN   (+) 40 weeks gestation of pregnancy      HEMATOLOGY   (+) Iron deficiency anemia        Physical Exam    Airway    Mallampati score: I  TM Distance: >3 FB  Neck ROM: full     Dental       Cardiovascular  Cardiovascular exam normal    Pulmonary  Pulmonary exam normal     Other Findings  post-pubertal.      Anesthesia Plan  ASA Score- 2     Anesthesia Type- epidural with ASA Monitors.         Additional Monitors:     Airway Plan:            Plan Factors-Exercise tolerance (METS): >4 METS.    Chart reviewed. EKG reviewed. Imaging results reviewed. Existing labs reviewed. Patient summary reviewed.                  Induction- intravenous.    Postoperative Plan-     Informed Consent- Anesthetic plan and risks discussed with patient.  I personally reviewed this patient with the CRNA. Discussed and agreed on the Anesthesia Plan with the CRNA..

## 2024-01-06 NOTE — ASSESSMENT & PLAN NOTE
Partner unable to be tested due to lack of insurance/cost of test.  [place] screen in [place] screens for sickle cell disease.   Pediatrician should be advised.

## 2024-01-06 NOTE — OB LABOR/OXYTOCIN SAFETY PROGRESS
Oxytocin Safety Progress Check Note - Mita Calzada 28 y.o. female MRN: [id number]    Unit/Bed#: -01 Encounter: [id number]    Dose (keyur-units/min) Oxytocin: 0 keyur-units/min (per Dr. Meadows)  Contraction Frequency (minutes): 2-3  Contraction Intensity: Moderate  Uterine Activity Characteristics: Irregular  Cervical Dilation: 8        Cervical Effacement: 90  Fetal Station: 0  Baseline Rate (FHR): 125 bpm  Fetal Heart Rate (FHT): 149 BPM  FHR Category: II               Vital Signs:   Vitals:    01/06/24 1754   BP: 110/71   Pulse: 68   Resp:    Temp:    SpO2:        Notes/comments:   1 minute deceleration with cliff in the 60s. Moderate variability and accelerations present. Resolved with repositioning. Cervical exam as above. Plan to reassess in 2 hours or sooner if clinically indicated.     Ivon Meadows MD 1/6/2024 6:37 PM

## 2024-01-06 NOTE — ASSESSMENT & PLAN NOTE
Routine postpartum care  Encourage ambulation  Encourage familial bonding  Lactation support as needed  Pain: Motrin/Tylenol around the clock  Postpartum Contraceptive plan: POPs  Appropriate bowel and bladder function

## 2024-01-06 NOTE — ANESTHESIA PROCEDURE NOTES
Epidural Block    Patient location during procedure: OB/L&D  Start time: 1/6/2024 3:57 PM  Reason for block: procedure for pain  Staffing  Performed by: TELLO Pretty MD  Authorized by: TELLO Pretty MD    Preanesthetic Checklist  Completed: patient identified, IV checked, site marked, risks and benefits discussed, surgical consent, monitors and equipment checked, pre-op evaluation and timeout performed  Epidural  Patient position: sitting  Prep: ChloraPrep  Sedation Level: no sedation  Patient monitoring: frequent blood pressure checks, continuous pulse oximetry and heart rate  Approach: midline  Location: lumbar, L4-5  Injection technique: CHOLO air  Needle  Needle type: Tuohy   Needle gauge: 17 G  Needle insertion depth: 5 cm  Catheter type: multi-orifice  Catheter size: 20 G  Catheter at skin depth: 10 cm  Catheter securement method: stabilization device and clear occlusive dressing  Test dose: negativelidocaine-epinephrine (XYLOCAINE-MPF/EPINEPHRINE) 1.5 %-1:200,000 injection 3 mL - Epidural   3 mL - 1/6/2024 3:58:00 PM   2 mL - 1/6/2024 4:01:00 PM  Assessment  Sensory level: T10  Number of attempts: 2negative aspiration for CSF, negative aspiration for heme and no paresthesia on injection  patient tolerated the procedure well with no immediate complications  Additional Notes  FIRST ATTEMPT AT L3-4. TIGHT SPACE. SECOND ATTEMPT AT L4-5. EASY CATHETER PASS.

## 2024-01-06 NOTE — TELEPHONE ENCOUNTER
"Regardin weeks preg / Contractions every 5 min  ----- Message from Suzette Sweet sent at 2024 10:59 AM EST -----  \" Today is my wife's due date and her contractions are 5 minutes apart\"    "

## 2024-01-06 NOTE — TELEPHONE ENCOUNTER
Reason for Disposition  • [1] History of prior delivery (multipara) AND [2] contractions < 10 minutes apart AND [3] present 1 hour    Protocols used: Pregnancy - Labor-ADULT-

## 2024-01-06 NOTE — OB LABOR/OXYTOCIN SAFETY PROGRESS
Oxytocin Safety Progress Check Note - Mita Calzada 28 y.o. female MRN: [id number]    Unit/Bed#: -01 Encounter: [id number]    Dose (keyur-units/min) Oxytocin: 4 keyur-units/min  Contraction Frequency (minutes): 4-5  Contraction Intensity: Moderate  Uterine Activity Characteristics: Irregular  Cervical Dilation: 6-7        Cervical Effacement: 80  Fetal Station: -1  Baseline Rate (FHR): 125 bpm  Fetal Heart Rate (FHT): 149 BPM  FHR Category: II               Vital Signs:   Vitals:    01/06/24 1729   BP: 114/77   Pulse: 61   Resp:    Temp:    SpO2:        Notes/comments:   Late decelerations. Moderate variability and accelerations present. Cervical exam as above. Artificial amniotomy performed for clear fluid. Plan to reassess in 2 hours or sooner if clinically indicated. Dr. Nguyen aware     Ivon Meadows MD 1/6/2024 6:01 PM

## 2024-01-06 NOTE — PROGRESS NOTES
"H&P - Obstetrics   Mita Calzada 28 y.o. female MRN: [id number]  Unit/Bed#: -01 Encounter: [id number]    Assessment and Plan:  28 y.o.  at 40w0d who is being admitted for induction at term in the setting of nonreactive FHT. By issue:  * 40 weeks gestation of pregnancy  Assessment & Plan  Cephalic by ultrasound. Clinical EFW by [facility]: 6lbs  GBS positive status  Plan for Induction with guillory balloon/vaginal cytotec  Analgesia and/or epidural at patient request  Follow up CBC, Syphilis screening, blood type & antibody screen  Method of contraception: POPs    Iron deficiency anemia  Assessment & Plan  S/p venofer infusions  Follow up admission hgb    GBS bacteriuria  Assessment & Plan  Penicillin prophylaxis    Sickle cell trait (HCC)  Assessment & Plan  Partner unable to be tested due to lack of insurance/cost of test.  [place] screen in [place] screens for sickle cell disease. Pediatrician should be advised.         Plan of care discussed with Dr. Nguyen.    This patient will be an INPATIENT and I certify the anticipated length of stay is >2 Midnights.    History of Present Illness     Chief Complaint: \"Contractions\"    History of Present Illness:  Mita Calzada is a 28 y.o.  with an ÁNGEL of 2024, by Last Menstrual Period at 40w0d weeks gestation who presents with uterine contractions. They started yesterday and now feeling them q5 min, rating them 8/10. Denies any other complaints. No chest pain or SOB. Reports that other than anemia, this has been a healthy pregnancy.    Leakage of fluid: denies  Vaginal Bleeding: denies  Fetal movement: present    ROS otherwise negative.     Obstetrician: ROSANNE    Pregnancy complications:   Iron deficiency anemia  GBS Bacteriuria  Sickle cell trait     OB History    Para Term  AB Living   3 2 2     2   SAB IAB Ectopic Multiple Live Births         0 2      # Outcome Date GA Lbr [place]/2nd Weight Sex Delivery Anes PTL Lv   3 Current          " "  2 Term 06/07/21 40w0d / 00:26 3030 g (6 lb 10.9 oz) M Vag-Spont EPI N YO   1 Term 12/08/18     Vag-Spont   YO       Baby complications/comments: EFW 374g at 20 weeks     Review of Systems  12-point ROS negative unless stated in HPI.    Historical Information   Past Medical History:   Diagnosis Date    History of kidney stones     Irregular menstrual bleeding 8/12/2022    Had Paraguard IUD placed in July on 2021 and has noticed irregular and increased bleeding since that time.     IUD (intrauterine device) in place 7/30/2021    Paragard inserted 7/16/21     Past Surgical History:   Procedure Laterality Date    KIDNEY STONE SURGERY  2018    in [place]      Social History   Social History     Substance and Sexual Activity   Alcohol Use Never     Social History     Substance and Sexual Activity   Drug Use Never     Social History     Tobacco Use   Smoking Status Never   Smokeless Tobacco Never     Family History:   Family History   Problem Relation Age of Onset    Tuberculosis Mother     Heart attack Father 56    No Known Problems Sister     No Known Problems Brother     No Known Problems Brother     No Known Problems Brother     No Known Problems Maternal Grandmother     No Known Problems Maternal Grandfather     No Known Problems Paternal Grandmother     No Known Problems Son     No Known Problems Son     Cancer Neg Hx        Meds/Allergies      Medications Prior to Admission   Medication    docusate sodium (COLACE) 100 mg capsule    Prenatal Vit-Fe Fumarate-FA (PRENATAL PO)      No Known Allergies    Objective:  Vitals: Blood pressure 122/83, pulse 77, temperature 98.1 °F (36.7 °C), temperature source Oral, resp. rate 18, height 5' 5\" (1.651 m), weight 67.1 kg (148 lb), last menstrual period 04/01/2023, SpO2 98%, not currently breastfeeding.Body mass index is 24.63 kg/m².    Physical Exam  GEN: alert and oriented x 3, no apparent distress, appears well  CARDIAC: regular rate, warm and well perfused  PULMONARY: " normal effort  ABDOMEN: gravid, soft, no tenderness  GENITOURINARY: normal external female genitalia, SVE 0.5/30/-4   EXTREMITIES: nontender, no edema  FETAL ASSESSMENT:  Fetal heart rate: 125/moderate variability/15x15 accelerations/ audible variable deceleration; Category II  K[place]il: Q5    Prenatal Labs:   Blood type: B+  Antibody screen: negative  HIV: non-reactive  Hepatitis B surface antigen: non-reactive  Hepatitis C antibody: pending  Syphilis screening: negative  Gonorrhea/Chlamydia: negative/negative  Rubella: Immune  Varicella: Unknown  Urine culture: GBS bacteriuria  1 hour GTT: 124 mg/dL  Group B strep: positive  Last Hemoglobin: 13.4 g/dL  Last Platelet count: 166 K    Invasive Devices       Peripheral Intravenous Line  Duration             Peripheral IV 01/06/24 Right;Ventral (anterior) Forearm <1 day                    Ivon Meadows MD   PGY-I, OBGYN  1/6/2024  2:29 PM

## 2024-01-06 NOTE — PLAN OF CARE
Problem: PAIN - ADULT  Goal: Verbalizes/displays adequate comfort level or baseline comfort level  Description: Interventions:  - Encourage patient to monitor pain and request assistance  - Assess pain using appropriate pain scale  - Administer analgesics based on type and severity of pain and evaluate response  - Implement non-pharmacological measures as appropriate and evaluate response  - Consider cultural and social influences on pain and pain management  - Notify physician/advanced practitioner if interventions unsuccessful or patient reports new pain  Outcome: Progressing     Problem: INFECTION - ADULT  Goal: Absence or prevention of progression during hospitalization  Description: INTERVENTIONS:  - Assess and monitor for signs and symptoms of infection  - Monitor lab/diagnostic results  - Monitor all insertion sites, i.e. indwelling lines, tubes, and drains  - Monitor endotracheal if appropriate and nasal secretions for changes in amount and color  - [place] appropriate cooling/warming therapies per order  - Administer medications as ordered  - Instruct and encourage patient and family to use good hand hygiene technique  - Identify and instruct in appropriate isolation precautions for identified infection/condition  Outcome: Progressing  Goal: Absence of fever/infection during neutropenic period  Description: INTERVENTIONS:  - Monitor WBC    Outcome: Progressing     Problem: SAFETY ADULT  Goal: Patient will remain free of falls  Description: INTERVENTIONS:  - Educate patient/family on patient safety including physical limitations  - Instruct patient to call for assistance with activity   - Consult OT/PT to assist with strengthening/mobility   - Keep Call bell within reach  - Keep bed low and locked with side rails adjusted as appropriate  - Keep care items and personal belongings within reach  - Initiate and maintain comfort rounds  - Make Fall Risk Sign visible to staff  - Apply yellow socks and bracelet  for high fall risk patients  - Consider moving patient to room near nurses station  Outcome: Progressing  Goal: Maintain or return to baseline ADL function  Description: INTERVENTIONS:  -  Assess patient's ability to carry out ADLs; assess patient's baseline for ADL function and identify physical deficits which impact ability to perform ADLs (bathing, care of mouth/teeth, toileting, grooming, dressing, etc.)  - Assess/evaluate cause of self-care deficits   - Assess range of motion  - Assess patient's mobility; develop plan if impaired  - Assess patient's need for assistive devices and provide as appropriate  - Encourage maximum independence but intervene and supervise when necessary  - Involve family in performance of ADLs  - Assess for home care needs following discharge   - Consider OT consult to assist with ADL evaluation and planning for discharge  - Provide patient education as appropriate  Outcome: Progressing  Goal: Maintains/Returns to pre admission functional level  Description: INTERVENTIONS:  - Perform AM-PAC 6 Click Basic Mobility/ Daily Activity assessment daily.  - Set and communicate daily mobility goal to care team and patient/family/caregiver.   - Collaborate with rehabilitation services on mobility goals if consulted  - Out of bed for toileting  - Record patient progress and toleration of activity level   Outcome: Progressing     Problem: DISCHARGE PLANNING  Goal: Discharge to home or other facility with appropriate resources  Description: INTERVENTIONS:  - Identify barriers to discharge w/patient and caregiver  - Arrange for needed discharge resources and transportation as appropriate  - Identify discharge learning needs (meds, wound care, etc.)  - Arrange for interpretive services to assist at discharge as needed  - Refer to Case Management Department for coordinating discharge planning if the patient needs post-hospital services based on physician/advanced practitioner order or complex needs  related to functional status, cognitive ability, or social support system  Outcome: Progressing     Problem: Knowledge Deficit  Goal: Patient/family/caregiver demonstrates understanding of disease process, treatment plan, medications, and discharge instructions  Description: Complete learning assessment and assess knowledge base.  Interventions:  - Provide teaching at level of understanding  - Provide teaching via preferred learning methods  Outcome: Progressing  Goal: Verbalizes understanding of labor plan  Description: Assess patient/family/caregiver's baseline knowledge level and ability to understand information.  Provide education via patient/family/caregiver's preferred learning method at appropriate level of understanding.     1. Provide teaching at level of understanding.  2. Provide teaching via preferred learning method(s).  Outcome: Progressing     Problem: Labor & Delivery  Goal: Manages discomfort  Description: Assess and monitor for signs and symptoms of discomfort.  Assess patient's pain level regularly and per hospital policy.  Administer medications as ordered. Support use of nonpharmacological methods to help control pain such as distraction, imagery, relaxation, and application of heat and cold.  Collaborate with interdisciplinary team and patient to determine appropriate pain management plan.    1. Include patient in decisions related to comfort.  2. Offer non-pharmacological pain management interventions.  3. Report ineffective pain management to physician.  Outcome: Progressing  Goal: Patient vital signs are stable  Description: 1. Assess vital signs - vaginal delivery.  Outcome: Progressing

## 2024-01-07 RX ADMIN — ACETAMINOPHEN 650 MG: 325 TABLET, FILM COATED ORAL at 04:05

## 2024-01-07 RX ADMIN — DOCUSATE SODIUM 100 MG: 100 CAPSULE, LIQUID FILLED ORAL at 08:44

## 2024-01-07 RX ADMIN — IBUPROFEN 600 MG: 600 TABLET ORAL at 04:05

## 2024-01-07 RX ADMIN — DOCUSATE SODIUM 100 MG: 100 CAPSULE, LIQUID FILLED ORAL at 18:10

## 2024-01-07 RX ADMIN — IBUPROFEN 600 MG: 600 TABLET ORAL at 14:34

## 2024-01-07 NOTE — ANESTHESIA POSTPROCEDURE EVALUATION
Post-Op Assessment Note    CV Status:  Stable    Pain management: adequate      Post-op block assessment: site cleaned, no complications and catheter intact   Mental Status:  Alert and awake   Hydration Status:  Euvolemic   PONV Controlled:  Controlled   Airway Patency:  Patent     Post Op Vitals Reviewed: Yes      Staff: CRNA             BP   125/68   Temp      Pulse  80   Resp      SpO2

## 2024-01-07 NOTE — DISCHARGE SUMMARY
Obstetrics Discharge Summary  Mita Calzada 28 y.o. female MRN: [id number]  Unit/Bed#: -01 Encounter: [id number]    Admission Date: 2024     Discharge Date: 24    Admitting Attending: Wendy  Delivery Attending: Wendy   Discharging Attending: Otilio    Admitting Diagnoses:   1. Pregnancy at 40 weeks  2. Iron deficiency anemia  3. GBS bacteriuria  4. Sickle Cell trait        Discharge Diagnoses:   1. Same as above  2. Delivery of term     Procedures: spontaneous vaginal delivery    Anesthesia: epidural    Hospital course: Mita Calzada is now a 28 y.o.  who was initially admitted at 40w0d for eIOL. She got an epidural. Pitocin was started for induction. Artificial amniotomy was performed for clear fluid. She progressed to complete cervical dilation and began pushing.    On 24 she delivered a viable female  40w0d via normal spontaneous vaginal delivery. She sustained a second degree perineal laceration during delivery  which was adequately repaired. 's birth weight was 6lbs 10.5oz; Apgars were 9 (1 min) and 9 (5 min).  was admitted to the  nursery. Patient tolerated the procedure well.     Her post-delivery course was uncomplicated. Her postpartum pain was well controlled with oral analgesics. Maternal blood type is B positive so RhoGAM was not indicated.    On day of discharge, she was ambulating and able to reasonably perform all ADLs. She was voiding and had appropriate bowel function. Pain was well controlled. She was discharged home on postpartum day #2 without complications. Patient was instructed to follow up with her OBGYN as an outpatient and was given appropriate warnings to call provider if she develops signs of infection or uncontrolled pain.    Complications: none apparent    Condition at discharge: stable     Provisions for Follow-Up Care:  Please see after visit summary for information related to follow-up care and any pertinent  home health orders.      Disposition: Home    Planned Readmission: No    Discharge Medications:   Please see AVS for a complete list of discharge medications.    Discharge instructions :   Please see AVS for complete discharge instructions.    Kostas Cifuentes DO  PGY-1 Family Medicine

## 2024-01-07 NOTE — L&D DELIVERY NOTE
OBGYN Vaginal Delivery Summary  Mita Calzada 28 y.o. female MRN: [id number]  Unit/Bed#: -01 Encounter: [id number]    Predelivery Diagnosis:  1. Pregnancy at 40 weeks  2. Iron deficiency anemia  3. GBS bacteriuria  4. Sickle Cell trait     Postdelivery Diagnosis:  1. Same as above  2. Delivery of term     Procedure: spontaneous vaginal delivery, repair of second degree perineal laceration(s)    Attending: Wendy    Assistant: Ashley Meadows    Anesthesia: Epidural    QBL: 50mL  Admission Hgb: 13.4 g/dL  Admission platelets: 166 thousands/uL    Complications: none apparent    Specimens: cord blood, arterial and venous cord blood gases, placenta to storage    Findings:   1. Viable female at 193, with APGARS of 9 and 9 at 1 and 5 minutes respectively. Weight pending at time of dictation.  2. Spontaneous delivery of intact placenta at . Normal insertion 3 vessel umbilical cord  3. Second degree laceration repaired with 3-0 vicryl rapide  4. Blood gases:  Umbilical Cord Venous Blood Gas:  Results from last 7 days   Lab Units 24   PH COV  7.367   PCO2 COV mm HG 42.3   HCO3 COV mmol/L 23.7   BASE EXC COV mmol/L -1.6*   O2 CT CD VB mL/dL 15.1   O2 HGB, VENOUS CORD % 66.0     Umbilical Cord Arterial Blood Gas:  Results from last 7 days   Lab Units 24   PH COA  7.263   PCO2 COA  55.2   PO2 COA mm HG 17.7   HCO3 COA mmol/L 24.4   BASE EXC COA mmol/L -3.5*   O2 CONTENT CORD ART ml/dl 7.4   O2 HGB, ARTERIAL CORD % 33.6       Brief history and labor course:  Mita Calzada is a 28 y.o.  at 40wk0d. She presented to labor and delivery for contractions. She was then admitted for eIOL. Pitocin was started for induction. Artificial amniotomy was performed for clear fluid. She progressed to complete cervical dilation and began pushing.     Description of procedure  After pushing for 12 minutes, the patient delivered a viable female  at 193 on 24, weight pending at  time of dictation, apgars of 9 (1 min) and 9 (5 min). The fetal vertex delivered spontaneously. Baby restituted  to BRUCE. There was a tight nuchal cord that was delivered  through. The anterior (left) shoulder delivered atraumatically with maternal expulsive forces and the assistance of gentle downward traction. The posterior shoulder delivered with maternal expulsive forces and the assistance of gentle upward traction. The remainder of the fetus then delivered spontaneously.     Upon delivery the infant was placed on the mother's abdomen and delayed cord clamping was performed. The umbilical cord was then doubly clamped and cut. The infant was noted to cry spontaneously and was moving all extremities appropriately. There was no evidence for injury. Awaiting nurse resuscitators evaluated the . Arterial and venous cord blood gases and cord blood were collected for analysis and were promptly sent to the lab. In the immediate post-partum, IV pitocin was administered, and the uterus was noted to contract down well with massage and pitocin. The placenta delivered with uterine massage at 1938 and was noted to be intact and had normal insertion of a 3 vessel cord. The placenta was sent to storage.    The vagina, cervix, perineum, and rectum were inspected. Second degree perineal laceration(s) were noted. Repair was completed with 3-0 Vicryl rapide.    At the conclusion of the procedure, all needle, sponge, and instrument counts were noted to be correct.      Dr. Nguyen was present and participated in all key portions of the case.    Disposition:  Patient tolerated the procedure well and was recovering in labor and delivery room.  is admitted to  nursery.      Ivon Meadows MD  2024  7:57 PM

## 2024-01-07 NOTE — PLAN OF CARE
Problem: PAIN - ADULT  Goal: Verbalizes/displays adequate comfort level or baseline comfort level  Description: Interventions:  - Encourage patient to monitor pain and request assistance  - Assess pain using appropriate pain scale  - Administer analgesics based on type and severity of pain and evaluate response  - Implement non-pharmacological measures as appropriate and evaluate response  - Consider cultural and social influences on pain and pain management  - Notify physician/advanced practitioner if interventions unsuccessful or patient reports new pain  Outcome: Progressing     Problem: INFECTION - ADULT  Goal: Absence or prevention of progression during hospitalization  Description: INTERVENTIONS:  - Assess and monitor for signs and symptoms of infection  - Monitor lab/diagnostic results  - Monitor all insertion sites, i.e. indwelling lines, tubes, and drains  - Monitor endotracheal if appropriate and nasal secretions for changes in amount and color  - [place] appropriate cooling/warming therapies per order  - Administer medications as ordered  - Instruct and encourage patient and family to use good hand hygiene technique  - Identify and instruct in appropriate isolation precautions for identified infection/condition  Outcome: Progressing  Goal: Absence of fever/infection during neutropenic period  Description: INTERVENTIONS:  - Monitor WBC    Outcome: Progressing     Problem: SAFETY ADULT  Goal: Patient will remain free of falls  Description: INTERVENTIONS:  - Educate patient/family on patient safety including physical limitations  - Instruct patient to call for assistance with activity   - Consult OT/PT to assist with strengthening/mobility   - Keep Call bell within reach  - Keep bed low and locked with side rails adjusted as appropriate  - Keep care items and personal belongings within reach  - Initiate and maintain comfort rounds  - Make Fall Risk Sign visible to staff  - Apply yellow socks and bracelet  for high fall risk patients  - Consider moving patient to room near nurses station  Outcome: Progressing  Goal: Maintain or return to baseline ADL function  Description: INTERVENTIONS:  -  Assess patient's ability to carry out ADLs; assess patient's baseline for ADL function and identify physical deficits which impact ability to perform ADLs (bathing, care of mouth/teeth, toileting, grooming, dressing, etc.)  - Assess/evaluate cause of self-care deficits   - Assess range of motion  - Assess patient's mobility; develop plan if impaired  - Assess patient's need for assistive devices and provide as appropriate  - Encourage maximum independence but intervene and supervise when necessary  - Involve family in performance of ADLs  - Assess for home care needs following discharge   - Consider OT consult to assist with ADL evaluation and planning for discharge  - Provide patient education as appropriate  Outcome: Progressing  Goal: Maintains/Returns to pre admission functional level  Description: INTERVENTIONS:  - Perform AM-PAC 6 Click Basic Mobility/ Daily Activity assessment daily.  - Set and communicate daily mobility goal to care team and patient/family/caregiver.   - Collaborate with rehabilitation services on mobility goals if consulted  - Out of bed for toileting  - Record patient progress and toleration of activity level   Outcome: Progressing     Problem: DISCHARGE PLANNING  Goal: Discharge to home or other facility with appropriate resources  Description: INTERVENTIONS:  - Identify barriers to discharge w/patient and caregiver  - Arrange for needed discharge resources and transportation as appropriate  - Identify discharge learning needs (meds, wound care, etc.)  - Arrange for interpretive services to assist at discharge as needed  - Refer to Case Management Department for coordinating discharge planning if the patient needs post-hospital services based on physician/advanced practitioner order or complex needs  related to functional status, cognitive ability, or social support system  Outcome: Progressing     Problem: Knowledge Deficit  Goal: Patient/family/caregiver demonstrates understanding of disease process, treatment plan, medications, and discharge instructions  Description: Complete learning assessment and assess knowledge base.  Interventions:  - Provide teaching at level of understanding  - Provide teaching via preferred learning methods  Outcome: Progressing  Goal: Verbalizes understanding of labor plan  Description: Assess patient/family/caregiver's baseline knowledge level and ability to understand information.  Provide education via patient/family/caregiver's preferred learning method at appropriate level of understanding.     1. Provide teaching at level of understanding.  2. Provide teaching via preferred learning method(s).  Outcome: Progressing     Problem: Labor & Delivery  Goal: Manages discomfort  Description: Assess and monitor for signs and symptoms of discomfort.  Assess patient's pain level regularly and per hospital policy.  Administer medications as ordered. Support use of nonpharmacological methods to help control pain such as distraction, imagery, relaxation, and application of heat and cold.  Collaborate with interdisciplinary team and patient to determine appropriate pain management plan.    1. Include patient in decisions related to comfort.  2. Offer non-pharmacological pain management interventions.  3. Report ineffective pain management to physician.  Outcome: Progressing  Goal: Patient vital signs are stable  Description: 1. Assess vital signs - vaginal delivery.  Outcome: Progressing

## 2024-01-07 NOTE — PROGRESS NOTES
Obstetrics Progress Note  Mita Calzada 28 y.o. female MRN: [id number]  Unit/Bed#: -01 Encounter: [id number]    Assessment/Plan:  Postpartum Day #1 s/p . Stable. Baby in room. By issue:  *  (spontaneous vaginal delivery)  Assessment & Plan  Routine postpartum care  Encourage ambulation  Encourage familial bonding  Lactation support as needed  Pain: Motrin/Tylenol around the clock  Postpartum Contraceptive plan: POPs  Appropriate bowel and bladder function    Sickle cell trait (HCC)  Assessment & Plan  Partner unable to be tested due to lack of insurance/cost of test.   screen in [place] screens for sickle cell disease.   Pediatrician should be advised.       Anticipate discharge PPD #1 vs #2.    Subjective/Objective   Chief Complaint:   Post delivery     Subjective:   Pain: controlled. Tolerating PO: yes. Voiding: yes. Flatus: yes. Ambulating: yes. Chest pain: no. Shortness of breath: no. Leg pain: no. Lochia: within normal limits. Infant feeding: breast.    Objective:   Vitals:   Temp:  [97.6 °F (36.4 °C)-98.2 °F (36.8 °C)] 98.1 °F (36.7 °C)  HR:  [54-85] 57  Resp:  [18] 18  BP: ()/(56-92) 102/67       Intake/Output Summary (Last 24 hours) at 2024 0743  Last data filed at 2024 0330  Gross per 24 hour   Intake --   Output 1450 ml   Net -1450 ml       Lab Results   Component Value Date    WBC 7.58 2024    HGB 13.4 2024    HCT 39.5 2024    MCV 77 (L) 2024     2024       Physical Exam:   General: alert and oriented x3, in no apparent distress  Cardiovascular: regular rate  Pulmonary: normal effort  Abdomen: Soft, non-tender, non-distended, no rebound or guarding. Uterine fundus firm and non-tender, at the umbilicus   Extremities: Non tender     Ivon Meadows MD  PGY-I, OBGYN  2024, 7:43 AM

## 2024-01-07 NOTE — OB LABOR/OXYTOCIN SAFETY PROGRESS
Labor Progress Note - Mita Calzada 28 y.o. female MRN: [id number]    Unit/Bed#: -01 Encounter: [id number]    Dose (keyur-units/min) Oxytocin: 0 keyur-units/min (per Dr. Meadows)  Contraction Frequency (minutes): 2-3  Contraction Intensity: Moderate  Uterine Activity Characteristics: Irregular  Cervical Dilation: 9        Cervical Effacement: 100  Fetal Station: 0  Baseline Rate (FHR): 125 bpm  Fetal Heart Rate (FHT): 149 BPM  FHR Category: II               Vital Signs:   Vitals:    01/06/24 1902   BP: 109/68   Pulse: 65   Resp:    Temp:    SpO2:        Notes/comments:    5 minute decel resolved with repositioning. Moderate variability and accelerations present. Cervical exam as above. Plan to reassess in 2 hours or sooner if clinically indicated. Dr. Nguyen aware.     Ivon Meadows MD 1/6/2024 7:12 PM

## 2024-01-08 VITALS
WEIGHT: 148 LBS | RESPIRATION RATE: 16 BRPM | OXYGEN SATURATION: 100 % | HEART RATE: 62 BPM | BODY MASS INDEX: 24.66 KG/M2 | TEMPERATURE: 98.4 F | HEIGHT: 65 IN | DIASTOLIC BLOOD PRESSURE: 68 MMHG | SYSTOLIC BLOOD PRESSURE: 102 MMHG

## 2024-01-08 LAB
HCV AB SER QL: NORMAL
TREPONEMA PALLIDUM IGG+IGM AB [PRESENCE] IN SERUM OR PLASMA BY IMMUNOASSAY: NORMAL

## 2024-01-08 RX ORDER — ACETAMINOPHEN AND CODEINE PHOSPHATE 120; 12 MG/5ML; MG/5ML
1 SOLUTION ORAL DAILY
Qty: 28 TABLET | Refills: 1 | Status: SHIPPED | OUTPATIENT
Start: 2024-01-08

## 2024-01-08 RX ORDER — ACETAMINOPHEN 325 MG/1
650 TABLET ORAL EVERY 4 HOURS PRN
Start: 2024-01-08

## 2024-01-08 RX ORDER — IBUPROFEN 600 MG/1
600 TABLET ORAL EVERY 6 HOURS
Start: 2024-01-08

## 2024-01-08 RX ADMIN — IBUPROFEN 600 MG: 600 TABLET ORAL at 00:56

## 2024-01-08 RX ADMIN — DOCUSATE SODIUM 100 MG: 100 CAPSULE, LIQUID FILLED ORAL at 09:25

## 2024-01-08 RX ADMIN — IBUPROFEN 600 MG: 600 TABLET ORAL at 09:25

## 2024-01-08 NOTE — LACTATION NOTE
This note was copied from a baby's chart.  CONSULT - LACTATION  Baby Girl Calzada (Aissatou) 2 days female MRN: [id number]    [facility] NURS[facility] Room / Bed: (N)/(N) Encounter: [id number]    Maternal Information     MOTHER:  Mita Calzada  Maternal Age: 28 y.o.   OB History: # 1 - Date: 18, Sex: None, Weight: None, GA: None, Delivery: Vaginal, Spontaneous, Apgar1: None, Apgar5: None, Living: Living, Birth Comments: None    # 2 - Date: 21, Sex: Male, Weight: 3030 g (6 lb 10.9 oz), GA: 40w0d, Delivery: Vaginal, Spontaneous, Apgar1: 9, Apgar5: 9, Living: Living, Birth Comments: None    # 3 - Date: 24, Sex: Female, Weight: 3020 g (6 lb 10.5 oz), GA: 40w0d, Delivery: Vaginal, Spontaneous, Apgar1: 9, Apgar5: 9, Living: Living, Birth Comments: None   Previouse breast reduction surgery? No    Lactation history:   Has patient previously breast fed: Yes   How long had patient previously breast fed: 2nd child- 3 months   Previous breast feeding complications: Low milk supply     Past Surgical History:   Procedure Laterality Date    KIDNEY STONE SURGERY  2018    in [place]         Birth information:  YOB: 2026   Time of birth: 7:32 PM   Sex: female   Delivery type: Vaginal, Spontaneous   Birth Weight: 3020 g (6 lb 10.5 oz)   Percent of Weight Change: -1%     Gestational Age: 40w0d   [unfilled]    Assessment     Breast and nipple assessment:  bilateral clonical breasts with large areolas. Nipple everts with stimulation.     [place] Assessment: normal assessment    Feeding assessment:  did not assess.      24 1000   Lactation Consultation   Reason for Consult 20;20 minutes;15 min   Maternal Information   Has mother  before? Yes   How long did the the mother previously breastfeed? 2nd child- 3 months   Previous Maternal Breastfeeding Challenges Low milk supply   Infant to breast within first hour of birth? Yes   Breasts/Nipples   Left  Breast Engorged;Filling;Tender;Soft   Right Breast Engorged;Filling;Soft   Left Nipple Everted   Right Nipple Everted   Intervention Hand expression;Breast pump   Breastfeeding Status Yes   Breastfeeding Progress Not yet established   Other OB Lactation Tools   Feeding Devices Bottle;Pump;Syringe   Breast Pump   Pump 3  (Gave storkpump pamphlet will let us konw what pump they want before discharge.)   Pump Review/Education Setup, frequency, and cleaning;Milk storage   Initiated by TOBI Guo   Date Initiated 01/08/24   Patient Follow-Up   Lactation Consult Status 2   Follow-Up Type Inpatient;Call as needed   Other OB Lactation Documentation    Additional Problem Noted Mom trying to latch baby. Mom states baby latched for 2 minutes. Giving 25 mLs of formula. Mom is leaking colostrum, engorged breasts. Set up to pump. Cycled through a pumping session with mom. 18 mLs of colostrum. Set up mixed feeding plan with mom and dad. Reviewed breast milk storage guidelines.  (RSB and D/C booklets reviewed.)        Feeding recommendations:   mixed feeding plan.   Dad translates for mother. Mom trying to latch baby every feeding. Mom states baby does not latch long. Prior to arrival in room, Mom states baby latched for 2 minutes. Dad just gave 25 mLs of formula. Education on risk of early supplementation and babies bellies. Mom is leaking colostrum, engorged breasts. Hand expression with lightest touch is very painful. Set mom up to pump. Cycled through a pumping session with mom. Collected 18 mLs of colostrum after pumping for 20 minutes. Hand expression demonstrated with teach back. Less painful per patient. Set up mixed feeding plan with mom and dad. Reviewed breast milk storage guidelines. Encouraged baby and me appointment.     Feeding Plan:  1. Meet early feeding cues  2. Bring baby to breast skin to skin  3. Tuck chin deeply into the breast to assist with deeper latch  4. Align nipple to nose, chin deep into breast,  (move baby not breast) and bring baby to breast when mouth is wide and deep latch is achieved.  5. Use breast compressions to stimulate suck  6. introduce breast first for feeding  7. to feed infant expressed breast milk after breast  8. feed infant formula  9.  to pump after as many feedings at the breast as reasonable  10. Follow up with outpatient lactation as soon as possible     Information on hand expression given. Discussed benefits of knowing how to manually express breast including stimulating milk supply, softening nipple for latch and evacuating breast in the event of engorgement.    Met with mother. Provided mother with Ready, Set, Baby booklet which contained information on:  Hand expression with access to QR codes to review hand expression.  Positioning and latch reviewed as well as showing images of other feeding positions.  Discussed the properties of a good latch in any position.   Feeding on cue and what that means for recognizing infant's hunger, s/s that baby is getting enough milk and some s/s that breastfeeding dyad may need further help  Skin to Skin contact an benefits to mom and baby  Avoidance of pacifiers for the first month discussed.   Gave information on common concerns, what to expect the first few weeks after delivery, preparing for other caregivers, and how partners can help. Resources for support also provided.    Met with mother to go over discharge breastfeeding booklet including the feeding log. Emphasized 8 or more (12) feedings in a 24 hour period, what to expect for the number of diapers per day of life and the progression of properties of the  stooling pattern.    List of reasons to call a lactation consultant.  Feeding logs  Feeding cues  Hand expression  Baby's Second day (cluster feeding)  Breastfeeding and Your Lifestyle (Medications, Alcohol, Caffeine, Smoking, Street Drugs, Methadone)  First Two Weeks Survival Guide for Breastfeeding  Breast Changes  Physical  Therapy  Storage and Handling of Breast milk  How to Keep Your Breast Pump Kit Clean  The Employed Breastfeeding Mother  Mixed feeding  Bottle feeding like breastfeeding (paced bottle feeding)  astfeeding and your lifestyle, storage and preparation of breast milk, how to keep you breast pump clean, the employed breastfeeding mother and paced bottle feeding handouts.     Booklet included Breastfeeding Resources for after discharge including access to the number for the [facility].      Patricia Chance 1/8/2024 10:52 AM

## 2024-01-08 NOTE — PROGRESS NOTES
"Progress Note - OB/GYN  Mita Calzada 28 y.o. female MRN: [id number]  Unit/Bed#: -01 Encounter: [id number]    Assessment and Plan     Mita Calzada is a patient of: [facility]. She is PPD# 2 s/p  spontaneous vaginal delivery  Recovering well and is stable       Sickle cell trait (HCC)  Assessment & Plan  Partner unable to be tested due to lack of insurance/cost of test.   screen in [place] screens for sickle cell disease.   Pediatrician should be advised.     *  (spontaneous vaginal delivery)  Assessment & Plan  Routine postpartum care  Encourage ambulation  Encourage familial bonding  Lactation support as needed  Pain: Motrin/Tylenol around the clock  Postpartum Contraceptive plan: POPs  Appropriate bowel and bladder function        Disposition    - Anticipate discharge home on PPD# 2      Subjective/Objective     Chief Complaint: Postpartum State     Subjective:    Mita Calzada is PPD/POD#2 s/p  spontaneous vaginal delivery. She has no current complaints.  Pain is well controlled.  Patient is currently voiding.  She is ambulating.  Patient is currently passing flatus and has had no bowel movement. She is tolerating PO, and denies nausea or vomitting. Patient denies fever, chills, chest pain, shortness of breath, or calf tenderness. Lochia is normal. She is  Breastfeeding. She is recovering well and is stable.       Vitals:   /59 (BP Location: Left arm)   Pulse 62   Temp 97.6 °F (36.4 °C) (Oral)   Resp 18   Ht 5' 5\" (1.651 m)   Wt 67.1 kg (148 lb)   LMP 2023 (Exact Date)   SpO2 100%   Breastfeeding Yes   BMI 24.63 kg/m²     No intake or output data in the 24 hours ending 24 0723    Invasive Devices       None                   Physical Exam:   GEN: Mita Calzada appears well, alert and oriented x 3, pleasant and cooperative   CARDIO: RRR, no murmurs or rubs  RESP:  CTAB, no wheezes or rales  ABDOMEN: soft, no tenderness, no distention, fundus @ " -3,   EXTREMITIES: SCDs on, non tender, no erythema or swelling, b/l Yovany's sign negative      Labs:     Hemoglobin   Date Value Ref Range Status   01/06/2024 13.4 11.5 - 15.4 g/dL Final   10/09/2023 9.9 (L) 11.5 - 15.4 g/dL Final     WBC   Date Value Ref Range Status   01/06/2024 7.58 4.31 - 10.16 Thousand/uL Final   10/09/2023 7.13 4.31 - 10.16 Thousand/uL Final     Platelets   Date Value Ref Range Status   01/06/2024 166 149 - 390 Thousands/uL Final   10/09/2023 192 149 - 390 Thousands/uL Final     Creatinine   Date Value Ref Range Status   06/16/2023 0.43 (L) 0.60 - 1.30 mg/dL Final     Comment:     Standardized to IDMS reference method   06/16/2021 0.70 0.60 - 1.30 mg/dL Final     Comment:     Standardized to IDMS reference method     AST   Date Value Ref Range Status   06/16/2023 15 5 - 45 U/L Final     Comment:     Specimen collection should occur prior to Sulfasalazine administration due to the potential for falsely depressed results.    06/16/2021 17 5 - 45 U/L Final     Comment:     Specimen collection should occur prior to Sulfasalazine administration due to the potential for falsely depressed results.      ALT   Date Value Ref Range Status   06/16/2023 16 12 - 78 U/L Final     Comment:     Specimen collection should occur prior to Sulfasalazine and/or Sulfapyridine administration due to the potential for falsely depressed results.    06/16/2021 20 12 - 78 U/L Final     Comment:     Specimen collection should occur prior to Sulfasalazine administration due to the potential for falsely depressed results.           Kostas Cifuentes,   1/8/2024  7:23 AM

## 2024-01-08 NOTE — PLAN OF CARE
Problem: PAIN - ADULT  Goal: Verbalizes/displays adequate comfort level or baseline comfort level  Description: Interventions:  - Encourage patient to monitor pain and request assistance  - Assess pain using appropriate pain scale  - Administer analgesics based on type and severity of pain and evaluate response  - Implement non-pharmacological measures as appropriate and evaluate response  - Consider cultural and social influences on pain and pain management  - Notify physician/advanced practitioner if interventions unsuccessful or patient reports new pain  Outcome: Adequate for Discharge     Problem: INFECTION - ADULT  Goal: Absence or prevention of progression during hospitalization  Description: INTERVENTIONS:  - Assess and monitor for signs and symptoms of infection  - Monitor lab/diagnostic results  - Monitor all insertion sites, i.e. indwelling lines, tubes, and drains  - Monitor endotracheal if appropriate and nasal secretions for changes in amount and color  - [place] appropriate cooling/warming therapies per order  - Administer medications as ordered  - Instruct and encourage patient and family to use good hand hygiene technique  - Identify and instruct in appropriate isolation precautions for identified infection/condition  Outcome: Adequate for Discharge  Goal: Absence of fever/infection during neutropenic period  Description: INTERVENTIONS:  - Monitor WBC    Outcome: Adequate for Discharge     Problem: SAFETY ADULT  Goal: Patient will remain free of falls  Description: INTERVENTIONS:  - Educate patient/family on patient safety including physical limitations  - Instruct patient to call for assistance with activity   - Consult OT/PT to assist with strengthening/mobility   - Keep Call bell within reach  - Keep bed low and locked with side rails adjusted as appropriate  - Keep care items and personal belongings within reach  - Initiate and maintain comfort rounds  - Make Fall Risk Sign visible to  staff  - Apply yellow socks and bracelet for high fall risk patients  - Consider moving patient to room near nurses station  Outcome: Adequate for Discharge  Goal: Maintain or return to baseline ADL function  Description: INTERVENTIONS:  -  Assess patient's ability to carry out ADLs; assess patient's baseline for ADL function and identify physical deficits which impact ability to perform ADLs (bathing, care of mouth/teeth, toileting, grooming, dressing, etc.)  - Assess/evaluate cause of self-care deficits   - Assess range of motion  - Assess patient's mobility; develop plan if impaired  - Assess patient's need for assistive devices and provide as appropriate  - Encourage maximum independence but intervene and supervise when necessary  - Involve family in performance of ADLs  - Assess for home care needs following discharge   - Consider OT consult to assist with ADL evaluation and planning for discharge  - Provide patient education as appropriate  Outcome: Adequate for Discharge  Goal: Maintains/Returns to pre admission functional level  Description: INTERVENTIONS:  - Perform AM-PAC 6 Click Basic Mobility/ Daily Activity assessment daily.  - Set and communicate daily mobility goal to care team and patient/family/caregiver.   - Collaborate with rehabilitation services on mobility goals if consulted  - Out of bed for toileting  - Record patient progress and toleration of activity level   Outcome: Adequate for Discharge     Problem: DISCHARGE PLANNING  Goal: Discharge to home or other facility with appropriate resources  Description: INTERVENTIONS:  - Identify barriers to discharge w/patient and caregiver  - Arrange for needed discharge resources and transportation as appropriate  - Identify discharge learning needs (meds, wound care, etc.)  - Arrange for interpretive services to assist at discharge as needed  - Refer to Case Management Department for coordinating discharge planning if the patient needs post-hospital  services based on physician/advanced practitioner order or complex needs related to functional status, cognitive ability, or social support system  Outcome: Adequate for Discharge     Problem: POSTPARTUM  Goal: Experiences normal postpartum course  Description: INTERVENTIONS:  - Monitor maternal vital signs  - Assess uterine involution and lochia  Outcome: Adequate for Discharge  Goal: Appropriate maternal -  bonding  Description: INTERVENTIONS:  - Identify family support  - Assess for appropriate maternal/infant bonding   -Encourage maternal/infant bonding opportunities  - Referral to  or  as needed  Outcome: Adequate for Discharge  Goal: Establishment of infant feeding pattern  Description: INTERVENTIONS:  - Assess breast/bottle feeding  - Refer to lactation as needed  Outcome: Adequate for Discharge  Goal: Incision(s), wounds(s) or drain site(s) healing without S/S of infection  Description: INTERVENTIONS  - Assess and document dressing, incision, wound bed, drain sites and surrounding tissue  - Provide patient and family education  - Perform skin care/dressing changes every  Outcome: Adequate for Discharge

## 2024-01-08 NOTE — PLAN OF CARE
Problem: PAIN - ADULT  Goal: Verbalizes/displays adequate comfort level or baseline comfort level  Description: Interventions:  - Encourage patient to monitor pain and request assistance  - Assess pain using appropriate pain scale  - Administer analgesics based on type and severity of pain and evaluate response  - Implement non-pharmacological measures as appropriate and evaluate response  - Consider cultural and social influences on pain and pain management  - Notify physician/advanced practitioner if interventions unsuccessful or patient reports new pain  Outcome: Progressing     Problem: INFECTION - ADULT  Goal: Absence or prevention of progression during hospitalization  Description: INTERVENTIONS:  - Assess and monitor for signs and symptoms of infection  - Monitor lab/diagnostic results  - Monitor all insertion sites, i.e. indwelling lines, tubes, and drains  - Monitor endotracheal if appropriate and nasal secretions for changes in amount and color  - [place] appropriate cooling/warming therapies per order  - Administer medications as ordered  - Instruct and encourage patient and family to use good hand hygiene technique  - Identify and instruct in appropriate isolation precautions for identified infection/condition  Outcome: Progressing

## 2024-01-13 LAB — PLACENTA IN STORAGE: NORMAL

## 2024-01-16 ENCOUNTER — TELEPHONE (OUTPATIENT)
Dept: OBGYN CLINIC | Facility: CLINIC | Age: 29
End: 2024-01-16

## 2024-01-16 NOTE — TELEPHONE ENCOUNTER
Pt called back and appt scheduled    Transition of Care Post Partum phone call: Congratulations.    Date of delivery: 2024  Weeks gestation: full term 40.0 weeks  Type of delivery: vaginal delivery  Sex of child: female  Name of child: Kathleen  Birth weight: 3020 gm   6lbs 10.5ounces  Vaginal bleeding status: moderate  Urinary status: none  Bowel movement: Yes  Incision status: NA   Pain control: acetaminophen   feeding: Using breast pump   Any baby blues: No  Scheduled for follow-up appointment: 2024

## 2024-01-16 NOTE — TELEPHONE ENCOUNTER
Attempted to call pt to schedule postpartum appt. Pt did not answer, unable to leave message. Pt has active [employer]. Will send message through there.

## 2024-02-05 ENCOUNTER — POSTPARTUM VISIT (OUTPATIENT)
Dept: OBGYN CLINIC | Facility: CLINIC | Age: 29
End: 2024-02-05

## 2024-02-05 VITALS
WEIGHT: 135 LBS | SYSTOLIC BLOOD PRESSURE: 107 MMHG | BODY MASS INDEX: 22.49 KG/M2 | DIASTOLIC BLOOD PRESSURE: 67 MMHG | HEIGHT: 65 IN

## 2024-02-05 DIAGNOSIS — K59.00 CONSTIPATION DURING PREGNANCY IN SECOND TRIMESTER: ICD-10-CM

## 2024-02-05 DIAGNOSIS — O99.612 CONSTIPATION DURING PREGNANCY IN SECOND TRIMESTER: ICD-10-CM

## 2024-02-05 PROCEDURE — 99024 POSTOP FOLLOW-UP VISIT: CPT | Performed by: OBSTETRICS & GYNECOLOGY

## 2024-02-05 RX ORDER — DOCUSATE SODIUM 100 MG/1
100 CAPSULE, LIQUID FILLED ORAL 2 TIMES DAILY
Qty: 60 CAPSULE | Refills: 1 | Status: SHIPPED | OUTPATIENT
Start: 2024-02-05

## 2024-02-05 NOTE — PROGRESS NOTES
Assessment/Plan     Normal postpartum exam.     1. Contraception: oral progesterone-only contraceptive  2. Lactation consult, [facility] information discussed.   3. Increase activity as tolerated, may resume all normal activity.  4. Anticipated return to work: 6 - 12 weeks post partum.  5. Next pap smear due .      Chanell Calzada is a 28 y.o. female who presents for a postpartum visit. Her pregnancy was uncomplicated. She delivered via  on 24, 6 lb 10.5oz, female . Her delivery course was uncomplicated.  Bleeding no bleeding. Bowel function is normal. Bladder function is normal. Desired contraception method is oral progesterone-only contraceptive.     Postpartum depression screening: negative. EPDS : 2    Baby's course has been good.   Baby is feeding by breast.    Last Pap : 20 NILM HPV neg   Gestational Diabetes: no      Current Outpatient Medications:     acetaminophen (TYLENOL) 325 mg tablet, Take 2 tablets (650 mg total) by mouth every 4 (four) hours as needed for mild pain, Disp: , Rfl:     docusate sodium (COLACE) 100 mg capsule, Take 1 capsule (100 mg total) by mouth 2 (two) times a day, Disp: 60 capsule, Rfl: 1    ibuprofen (MOTRIN) 600 mg tablet, Take 1 tablet (600 mg total) by mouth every 6 (six) hours, Disp: , Rfl:     norethindrone (Ortho Micronor) 0.35 MG tablet, Take 1 tablet (0.35 mg total) by mouth daily, Disp: 28 tablet, Rfl: 1    Prenatal Vit-Fe Fumarate-FA (PRENATAL PO), Take by mouth (Patient not taking: Reported on 2023), Disp: , Rfl:     No Known Allergies    Review of Systems  Constitutional: no fever, feels well  Breasts: no complaints of breast pain, breast lump, or nipple discharge  Gastrointestinal: no complaints nausea, vomiting  Genitourinary: as noted in HPI.  Neurological: no complaints of headache    Objective      LMP 2023 (Exact Date)     Physical Exam  Constitutional:       Appearance: Normal appearance.   HENT:      Head:  Normocephalic and atraumatic.   Eyes:      Extraocular Movements: Extraocular movements intact.   Cardiovascular:      Pulses: Normal pulses.   Pulmonary:      Effort: Pulmonary effort is normal.   Musculoskeletal:         General: Normal range of motion.   Neurological:      Mental Status: She is alert. Mental status is at baseline.   Skin:     General: Skin is warm and dry.   Psychiatric:         Mood and Affect: Mood normal.         Behavior: Behavior normal.

## 2024-09-20 ENCOUNTER — OFFICE VISIT (OUTPATIENT)
Dept: OBGYN CLINIC | Facility: CLINIC | Age: 29
End: 2024-09-20

## 2024-09-20 VITALS
BODY MASS INDEX: 22.47 KG/M2 | HEART RATE: 66 BPM | SYSTOLIC BLOOD PRESSURE: 112 MMHG | HEIGHT: 65 IN | DIASTOLIC BLOOD PRESSURE: 77 MMHG

## 2024-09-20 DIAGNOSIS — Z30.013 ENCOUNTER FOR INITIAL PRESCRIPTION OF INJECTABLE CONTRACEPTIVE: Primary | ICD-10-CM

## 2024-09-20 LAB
SL AMB POCT URINE HCG: POSITIVE
SL AMB POCT URINE HCG: POSITIVE

## 2024-09-20 PROCEDURE — 99213 OFFICE O/P EST LOW 20 MIN: CPT | Performed by: OBSTETRICS & GYNECOLOGY

## 2024-09-20 PROCEDURE — 81025 URINE PREGNANCY TEST: CPT | Performed by: OBSTETRICS & GYNECOLOGY

## 2024-09-20 RX ORDER — MEDROXYPROGESTERONE ACETATE 150 MG/ML
150 INJECTION, SUSPENSION INTRAMUSCULAR
Qty: 1 ML | Refills: 3 | Status: SHIPPED | OUTPATIENT
Start: 2024-09-20 | End: 2024-09-24 | Stop reason: SDUPTHER

## 2024-09-20 NOTE — PROGRESS NOTES
Pt returned for depo injection, x2 positive UPT, depo not administered pt has a follow up appt on tues 09/24.

## 2024-09-20 NOTE — PROGRESS NOTES
Ambulatory Visit  Name: Mita Calzada      : 1995      MRN: [id number]  Encounter Provider: [facility] [place] [place]  Encounter Date: 2024   Encounter department: [facility]    Assessment & Plan  Encounter for initial prescription of injectable contraceptive  Patient is 28-year-old female G3, P3 presenting for initial prescription of Depo.  Patient was previously taking progesterone only oral contraceptive pills following delivery of her baby in January.  Patient is now no longer breast-feeding and wishes to be on a injectable form of birth control that she will not have take a pill for daily.  Discussed with patient side effects include irregular bleeding and weight gain.  Patient is amenable to this.    Plan:  Sent Depo to pharmacy  Patient to return with Depo for injection in clinic today  Urine pregnancy test to be completed at nurse visit  Orders:    medroxyPROGESTERone (DEPO-PROVERA) 150 mg/mL injection; Inject 1 mL (150 mg total) into a muscle every 3 (three) months    Addendum: upon return for depo shot, urine pregnancy test positive x2. Patient then reports she was pregnant and had D&C , lmp , [facility]o was not administered and patient was scheduled follow up appointment with Dr. Vasques .     History of Present Illness     Mita Calzada is a 28 y.o. female who presents for birth control counseling. Patient had an uncomplicated  on 2024 and was seen in office 24 for postpartum follow up. At that time, patient was utilizing POPs for birth control and breast feeding.  Patient stating she no longer wishes to take a pill every day.  She denies ever having heavy periods and never had a problem with her weight previously.  Patient last took POPs 5 days ago, last intercourse 3 weeks ago.  She is very interested in receiving Depo.  Benefits and  "risks discussed with patient including risk of weight gain and side effects of irregular bleeding.        Review of Systems   Constitutional:  Negative for chills and fever.   HENT:  Negative for ear pain and sore throat.    Eyes:  Negative for pain and visual disturbance.   Respiratory:  Negative for cough and shortness of breath.    Cardiovascular:  Negative for chest pain and palpitations.   Gastrointestinal:  Negative for abdominal pain and vomiting.   Genitourinary:  Negative for dysuria and hematuria.   Musculoskeletal:  Negative for arthralgias and back pain.   Skin:  Negative for color change and rash.   Neurological:  Negative for seizures and syncope.   All other systems reviewed and are negative.          Objective     /77 (BP Location: Right arm, Patient Position: Sitting)   Pulse 66   Ht 5' 5\" (1.651 m)   LMP 09/10/2024 (Exact Date)   BMI 22.47 kg/m²     Physical Exam  Vitals and nursing note reviewed.   Constitutional:       Appearance: Normal appearance.   HENT:      Nose: Nose normal.      Mouth/Throat:      Mouth: Mucous membranes are moist.   Cardiovascular:      Rate and Rhythm: Normal rate and regular rhythm.      Pulses: Normal pulses.      Heart sounds: Normal heart sounds. No murmur heard.     No friction rub. No gallop.   Pulmonary:      Effort: Pulmonary effort is normal.      Breath sounds: Normal breath sounds. No wheezing, rhonchi or rales.   Abdominal:      General: Abdomen is flat. Bowel sounds are normal.      Palpations: Abdomen is soft.      Tenderness: There is no abdominal tenderness.   Musculoskeletal:         General: No swelling.   Skin:     General: Skin is warm.   Neurological:      Mental Status: She is alert. Mental status is at baseline.   Psychiatric:         Mood and Affect: Mood normal.         Behavior: Behavior normal.           Marcia Olsen M.D.  [facility] PGY2  9/20/2024, 1:32 PM    "

## 2024-09-24 ENCOUNTER — ULTRASOUND (OUTPATIENT)
Dept: OBGYN CLINIC | Facility: CLINIC | Age: 29
End: 2024-09-24

## 2024-09-24 VITALS
SYSTOLIC BLOOD PRESSURE: 112 MMHG | BODY MASS INDEX: 22.66 KG/M2 | HEART RATE: 63 BPM | HEIGHT: 65 IN | WEIGHT: 136 LBS | DIASTOLIC BLOOD PRESSURE: 61 MMHG

## 2024-09-24 DIAGNOSIS — Z30.013 ENCOUNTER FOR INITIAL PRESCRIPTION OF INJECTABLE CONTRACEPTIVE: ICD-10-CM

## 2024-09-24 DIAGNOSIS — Z98.890 S/P D&C (STATUS POST DILATION AND CURETTAGE): Primary | ICD-10-CM

## 2024-09-24 LAB — SL AMB POCT URINE HCG: POSITIVE

## 2024-09-24 PROCEDURE — 99213 OFFICE O/P EST LOW 20 MIN: CPT | Performed by: OBSTETRICS & GYNECOLOGY

## 2024-09-24 PROCEDURE — 81025 URINE PREGNANCY TEST: CPT | Performed by: OBSTETRICS & GYNECOLOGY

## 2024-09-24 RX ORDER — MEDROXYPROGESTERONE ACETATE 150 MG/ML
150 INJECTION, SUSPENSION INTRAMUSCULAR
Qty: 1 ML | Refills: 3 | Status: SHIPPED | OUTPATIENT
Start: 2024-09-24 | End: 2024-09-24

## 2024-09-24 RX ORDER — MEDROXYPROGESTERONE ACETATE 150 MG/ML
150 INJECTION, SUSPENSION INTRAMUSCULAR
Qty: 1 ML | Refills: 3 | Status: SHIPPED | OUTPATIENT
Start: 2024-09-24

## 2024-09-24 NOTE — PROGRESS NOTES
Ambulatory Visit  Name: Mita Calzada      : 1995      MRN: [id number]  Encounter Provider: Nnamdi Vasques MD  Encounter Date: 2024   Encounter department: [facility]    Assessment & Plan  S/P D&C (status post dilation and curettage)    Orders:    hCG, quantitative; Future    hCG, quantitative; Future    POCT urine HCG    Encounter for initial prescription of injectable contraceptive    Orders:    medroxyPROGESTERone (DEPO-PROVERA) 150 mg/mL injection; Inject 1 mL (150 mg total) into a muscle every 3 (three) months    POCT urine HCG      History of Present Illness     Mita Calzada is a 28 y.o.  female who presents for follow-up visit. On prior presentation , patient desired initiation of contraception management with Depo-Provera. After receiving script at her pharmacy and returning to the office, UPT was noted to be positive. Mita admitted to recent D&C procedure on  and LMP 9/10 following procedure. Depo-Provera was not administered and she was recommended follow-up in the office. Today, Mita reports feeling fine. [employer]rooms interpretor utilized for assistance with conversation. We discussed faintly positive UPT today with recommendation for quantitative bHCG to be drawn today and repeated in 48h to determine if persistently positive result from recent procedure or newly pregnant. We also discussed proceeding with Depo-Provera injection as this would not affect a new pregnancy but prevent future pregnancy. Mita is unsure if she can complete the blood work today and does not have time for the injection. Patient plans to follow-up when able.       Review of Systems   Constitutional:  Negative for chills and fever.   HENT:  Negative for congestion, sinus pressure and sinus pain.    Eyes:  Negative for visual disturbance.   Respiratory:  Negative for cough, shortness of breath and wheezing.    Cardiovascular:  Negative for chest  "pain, palpitations and leg swelling.   Gastrointestinal:  Negative for abdominal pain, constipation, diarrhea, nausea and vomiting.   Genitourinary:  Negative for dysuria, vaginal bleeding and vaginal discharge.   Skin:  Negative for color change, pallor and rash.   Neurological:  Negative for weakness and light-headedness.   Psychiatric/Behavioral:  Negative for agitation and behavioral problems.      OB History    Para Term  AB Living   4 3 3   1 3   SAB IAB Ectopic Multiple Live Births     1   0 3      # Outcome Date GA Lbr [place]/2nd Weight Sex Type Anes PTL Lv   4 IAB 24     TAB      3 Term 24 40w0d / 00:14 3020 g (6 lb 10.5 oz) F Vag-Spont EPI N YO   2 Term 21 40w0d / 00:26 3030 g (6 lb 10.9 oz) M Vag-Spont EPI N YO   1 Term 18     Vag-Spont   YO           Objective     /61   Pulse 63   Ht 5' 5\" (1.651 m)   Wt 61.7 kg (136 lb)   LMP 09/10/2024 (Exact Date)   BMI 22.63 kg/m²     Physical Exam  Constitutional:       General: She is not in acute distress.  HENT:      Head: Normocephalic.      Right Ear: External ear normal.      Left Ear: External ear normal.   Eyes:      General: No scleral icterus.        Right eye: No discharge.         Left eye: No discharge.      Conjunctiva/sclera: Conjunctivae normal.   Cardiovascular:      Rate and Rhythm: Normal rate.      Pulses: Normal pulses.   Pulmonary:      Effort: Pulmonary effort is normal.   Musculoskeletal:      Cervical back: Normal range of motion.   Neurological:      Mental Status: She is alert and oriented to person, place, and time. Mental status is at baseline.   Psychiatric:         Mood and Affect: Mood normal.         Behavior: Behavior normal.       Administrative Statements   I have spent a total time of 15 minutes in caring for this patient on the day of the visit/encounter including Diagnostic results, Prognosis, Risks and benefits of tx options, Instructions for management, Patient and family " education, Impressions, Counseling / Coordination of care, Documenting in the medical record, Reviewing / ordering tests, medicine, procedures  , Obtaining or reviewing history  , and Communicating with other healthcare professionals .

## 2024-09-25 ENCOUNTER — APPOINTMENT (OUTPATIENT)
Dept: LAB | Age: 29
End: 2024-09-25
Payer: COMMERCIAL

## 2024-09-25 DIAGNOSIS — Z98.890 S/P D&C (STATUS POST DILATION AND CURETTAGE): ICD-10-CM

## 2024-09-25 LAB — B-HCG SERPL-ACNC: 136.6 MIU/ML (ref 0–5)

## 2024-09-25 PROCEDURE — 84702 CHORIONIC GONADOTROPIN TEST: CPT

## 2024-09-25 PROCEDURE — 36415 COLL VENOUS BLD VENIPUNCTURE: CPT

## 2024-09-26 ENCOUNTER — CLINICAL SUPPORT (OUTPATIENT)
Dept: OBGYN CLINIC | Facility: CLINIC | Age: 29
End: 2024-09-26

## 2024-09-26 DIAGNOSIS — Z98.890 S/P D&C (STATUS POST DILATION AND CURETTAGE): ICD-10-CM

## 2024-09-26 DIAGNOSIS — Z30.42 ENCOUNTER FOR MANAGEMENT AND INJECTION OF DEPO-PROVERA: Primary | ICD-10-CM

## 2024-09-26 LAB — SL AMB POCT URINE HCG: POSITIVE

## 2024-09-26 PROCEDURE — 81025 URINE PREGNANCY TEST: CPT

## 2024-09-26 PROCEDURE — 96372 THER/PROPH/DIAG INJ SC/IM: CPT

## 2024-09-26 RX ORDER — MEDROXYPROGESTERONE ACETATE 150 MG/ML
150 INJECTION, SUSPENSION INTRAMUSCULAR ONCE
Status: COMPLETED | OUTPATIENT
Start: 2024-09-26 | End: 2024-09-26

## 2024-09-26 RX ADMIN — MEDROXYPROGESTERONE ACETATE 150 MG: 150 INJECTION, SUSPENSION INTRAMUSCULAR at 13:28

## 2024-09-26 NOTE — PROGRESS NOTES
Pt came in for depo with positive UPT [facility], reviewed this with Dr. Baker and Dr. Nnamdi Vasques notes and was advised to continue with depo administration today. HCG quantitative was ordered today by Dr. Baker to be completed tomorrow morning by pt to see if numbers are trending down pt agreed with this plan and verbally consented for administration of depo today.

## 2024-09-26 NOTE — PROGRESS NOTES
Depo-Provera     [x]   Patient provided box YES   3 Refills remain  Refills submitted no  Last  Annual Date / Birth control check : 09/20/24  Last Depo date: first today  Side effects: no  HCG: yes  if applicable: positive  Given by: Laurymar Reyes  Site: left deltoid    Calcium supplement daily teaching  Condoms for 2 weeks following first injection dose.

## 2024-09-27 ENCOUNTER — APPOINTMENT (OUTPATIENT)
Dept: LAB | Age: 29
End: 2024-09-27
Payer: COMMERCIAL

## 2024-09-27 DIAGNOSIS — Z98.890 S/P D&C (STATUS POST DILATION AND CURETTAGE): ICD-10-CM

## 2024-09-27 LAB — B-HCG SERPL-ACNC: 94.3 MIU/ML (ref 0–5)

## 2024-09-27 PROCEDURE — 84702 CHORIONIC GONADOTROPIN TEST: CPT

## 2024-09-27 PROCEDURE — 36415 COLL VENOUS BLD VENIPUNCTURE: CPT

## 2024-09-30 ENCOUNTER — TELEPHONE (OUTPATIENT)
Dept: OBGYN CLINIC | Facility: CLINIC | Age: 29
End: 2024-09-30

## 2024-09-30 NOTE — TELEPHONE ENCOUNTER
Called pt and informed of results pt verbalized understanding and was grateful.    ----- Message from Cindy Baker MD sent at 9/30/2024  8:36 AM EDT -----  Please inform HCG is decreasing  no need for any intervention.  TY  ----- Message -----  From: Lab, Background User  Sent: 9/27/2024   6:36 PM EDT  To: Cindy Baker MD

## 2024-10-03 NOTE — RESULT ENCOUNTER NOTE
Downtrending bHCG levels reviewed with Dr. Baker and communicated to patient on 9/30  Continue with q3mo IM Depo-Provera injections as indicated    Nnamdi Vasques MD  OB/GYN PGY-4

## 2024-10-24 ENCOUNTER — OFFICE VISIT (OUTPATIENT)
Dept: URGENT CARE | Age: 29
End: 2024-10-24
Payer: COMMERCIAL

## 2024-10-24 VITALS
OXYGEN SATURATION: 99 % | SYSTOLIC BLOOD PRESSURE: 124 MMHG | TEMPERATURE: 98.2 F | HEART RATE: 67 BPM | DIASTOLIC BLOOD PRESSURE: 78 MMHG | RESPIRATION RATE: 18 BRPM

## 2024-10-24 DIAGNOSIS — R05.1 ACUTE COUGH: Primary | ICD-10-CM

## 2024-10-24 PROCEDURE — 99213 OFFICE O/P EST LOW 20 MIN: CPT | Performed by: EMERGENCY MEDICINE

## 2024-10-24 RX ORDER — BENZONATATE 200 MG/1
200 CAPSULE ORAL 3 TIMES DAILY PRN
Qty: 20 CAPSULE | Refills: 0 | Status: SHIPPED | OUTPATIENT
Start: 2024-10-24

## 2024-10-24 NOTE — PROGRESS NOTES
[facility]        NAME: Mita Calzada is a 28 y.o. female  : 1995    MRN: [id number]  DATE: 2024  TIME: 2:26 PM    Assessment and Plan   Acute cough [R05.1]  1. Acute cough  benzonatate (TESSALON) 200 MG capsule        Cough, PND. No fevers. No hx asthma. In NAD. BS clear    Patient Instructions       Follow up with PCP in 3-5 days.  Proceed to  ER if symptoms worsen.    If tests have been performed at [facility], our office will contact you with results if changes need to be made to the care plan discussed with you at the visit.  [facility]    Chief Complaint     Chief Complaint   Patient presents with    Cough     C/o dry cough for 1 week, denies other symptoms.          History of Present Illness       Cough, PND. No fevers. No hx asthma. In NAD. BS clear    Cough  Associated symptoms include postnasal drip. Pertinent negatives include no fever, shortness of breath or wheezing.       Review of Systems   Review of Systems   Constitutional:  Negative for fever.   HENT:  Positive for postnasal drip.    Respiratory:  Positive for cough. Negative for shortness of breath and wheezing.    All other systems reviewed and are negative.        Current Medications       Current Outpatient Medications:     acetaminophen (TYLENOL) 325 mg tablet, Take 2 tablets (650 mg total) by mouth every 4 (four) hours as needed for mild pain (Patient not taking: Reported on 2024), Disp: , Rfl:     benzonatate (TESSALON) 200 MG capsule, Take 1 capsule (200 mg total) by mouth 3 (three) times a day as needed for cough, Disp: 20 capsule, Rfl: 0    docusate sodium (COLACE) 100 mg capsule, Take 1 capsule (100 mg total) by mouth 2 (two) times a day (Patient not taking: Reported on 2024), Disp: 60 capsule, Rfl: 1    ibuprofen (MOTRIN) 600 mg tablet, Take 1 tablet (600 mg total) by mouth every 6 (six) hours (Patient not taking: Reported on 2024), Disp: , Rfl:      medroxyPROGESTERone (DEPO-PROVERA) 150 mg/mL injection, Inject 1 mL (150 mg total) into a muscle every 3 (three) months, Disp: 1 mL, Rfl: 3    norethindrone (Ortho Micronor) 0.35 MG tablet, Take 1 tablet (0.35 mg total) by mouth daily (Patient not taking: Reported on 2/5/2024), Disp: 28 tablet, Rfl: 1    Prenatal Vit-Fe Fumarate-FA (PRENATAL PO), Take by mouth (Patient not taking: Reported on 12/29/2023), Disp: , Rfl:     Current Allergies     Allergies as of 10/24/2024    (No Known Allergies)            The following portions of the patient's history were reviewed and updated as appropriate: allergies, current medications, past family history, past medical history, past social history, past surgical history and problem list.     Past Medical History:   Diagnosis Date    History of kidney stones     Irregular menstrual bleeding 8/12/2022    Had Paraguard IUD placed in July on 2021 and has noticed irregular and increased bleeding since that time.     IUD (intrauterine device) in place 7/30/2021    Paragard inserted 7/16/21       Past Surgical History:   Procedure Laterality Date    KIDNEY STONE SURGERY  2018    in [place]        Family History   Problem Relation Age of Onset    Tuberculosis Mother     Heart attack Father 56    No Known Problems Sister     No Known Problems Brother     No Known Problems Brother     No Known Problems Brother     No Known Problems Maternal Grandmother     No Known Problems Maternal Grandfather     No Known Problems Paternal Grandmother     No Known Problems Son     No Known Problems Son     Cancer Neg Hx          Medications have been verified.        Objective   /78   Pulse 67   Temp 98.2 °F (36.8 °C)   Resp 18   SpO2 99%   No LMP recorded.       Physical Exam     Physical Exam  Vitals reviewed.   Constitutional:       Appearance: Normal appearance.   HENT:      Right Ear: Tympanic membrane normal.      Left Ear: Tympanic membrane normal.      Mouth/Throat:      Pharynx:  No posterior oropharyngeal erythema.   Cardiovascular:      Rate and Rhythm: Normal rate and regular rhythm.      Pulses: Normal pulses.      Heart sounds: Normal heart sounds.   Pulmonary:      Effort: Pulmonary effort is normal.      Breath sounds: Normal breath sounds.   Lymphadenopathy:      Cervical: No cervical adenopathy.   Neurological:      Mental Status: She is alert.

## 2024-12-13 ENCOUNTER — CLINICAL SUPPORT (OUTPATIENT)
Dept: OBGYN CLINIC | Facility: CLINIC | Age: 29
End: 2024-12-13

## 2024-12-13 DIAGNOSIS — Z30.42 ENCOUNTER FOR MANAGEMENT AND INJECTION OF DEPO-PROVERA: Primary | ICD-10-CM

## 2024-12-13 PROCEDURE — 96372 THER/PROPH/DIAG INJ SC/IM: CPT

## 2024-12-13 RX ORDER — MEDROXYPROGESTERONE ACETATE 150 MG/ML
150 INJECTION, SUSPENSION INTRAMUSCULAR
Status: SHIPPED | OUTPATIENT
Start: 2024-12-13

## 2024-12-13 RX ADMIN — MEDROXYPROGESTERONE ACETATE 150 MG: 150 INJECTION, SUSPENSION INTRAMUSCULAR at 11:05

## 2025-03-14 ENCOUNTER — CLINICAL SUPPORT (OUTPATIENT)
Dept: OBGYN CLINIC | Facility: CLINIC | Age: 30
End: 2025-03-14

## 2025-03-14 DIAGNOSIS — Z30.42 ENCOUNTER FOR MANAGEMENT AND INJECTION OF DEPO-PROVERA: Primary | ICD-10-CM

## 2025-03-14 PROCEDURE — 96372 THER/PROPH/DIAG INJ SC/IM: CPT

## 2025-03-14 RX ADMIN — MEDROXYPROGESTERONE ACETATE 150 MG: 150 INJECTION, SUSPENSION INTRAMUSCULAR at 11:33

## 2025-03-14 NOTE — PROGRESS NOTES
Depo-Provera     [x]   Patient provided box yes   1 Refills remain  Refills submitted no  Last  Annual Date / Birth control check : 9/20/24  Last Depo date: 12/13/24  Side effects: no  HCG: no  Given by: Siddharth Jack  Site: Left deltoid    Calcium supplement daily teaching  Condoms for 2 weeks following first injection dose.

## 2025-05-30 ENCOUNTER — CLINICAL SUPPORT (OUTPATIENT)
Dept: OBGYN CLINIC | Facility: CLINIC | Age: 30
End: 2025-05-30

## 2025-05-30 DIAGNOSIS — Z30.42 ENCOUNTER FOR MANAGEMENT AND INJECTION OF DEPO-PROVERA: Primary | ICD-10-CM

## 2025-05-30 DIAGNOSIS — Z30.013 ENCOUNTER FOR INITIAL PRESCRIPTION OF INJECTABLE CONTRACEPTIVE: ICD-10-CM

## 2025-05-30 RX ORDER — MEDROXYPROGESTERONE ACETATE 150 MG/ML
150 INJECTION, SUSPENSION INTRAMUSCULAR
Qty: 1 ML | Refills: 0 | Status: SHIPPED | OUTPATIENT
Start: 2025-05-30

## 2025-05-30 RX ADMIN — MEDROXYPROGESTERONE ACETATE 150 MG: 150 INJECTION, SUSPENSION INTRAMUSCULAR at 11:18

## 2025-05-30 NOTE — PROGRESS NOTES
Depo-Provera     [x]   Patient provided box yes   0 Refills remain  Refills submitted yes BILLY Eckert  Last  Annual Date / Birth control check : 9/20/24 annual kacie for 8/15/25  Last Depo date: 3/14/25  Side effects: no  HCG: no  Given by: Siddharth Jack  Site: Right deltoid    Calcium supplement daily teaching  Condoms for 2 weeks following first injection dose.

## 2025-07-05 ENCOUNTER — APPOINTMENT (OUTPATIENT)
Dept: RADIOLOGY | Age: 30
End: 2025-07-05
Payer: COMMERCIAL

## 2025-07-05 ENCOUNTER — OFFICE VISIT (OUTPATIENT)
Dept: URGENT CARE | Age: 30
End: 2025-07-05
Payer: COMMERCIAL

## 2025-07-05 VITALS
HEART RATE: 78 BPM | WEIGHT: 135 LBS | DIASTOLIC BLOOD PRESSURE: 70 MMHG | OXYGEN SATURATION: 99 % | HEIGHT: 64 IN | BODY MASS INDEX: 23.05 KG/M2 | SYSTOLIC BLOOD PRESSURE: 110 MMHG | RESPIRATION RATE: 18 BRPM | TEMPERATURE: 97.6 F

## 2025-07-05 DIAGNOSIS — J20.9 ACUTE BRONCHITIS, UNSPECIFIED ORGANISM: Primary | ICD-10-CM

## 2025-07-05 DIAGNOSIS — J20.9 ACUTE BRONCHITIS, UNSPECIFIED ORGANISM: ICD-10-CM

## 2025-07-05 PROCEDURE — 71046 X-RAY EXAM CHEST 2 VIEWS: CPT

## 2025-07-05 PROCEDURE — S9083 URGENT CARE CENTER GLOBAL: HCPCS | Performed by: PHYSICIAN ASSISTANT

## 2025-07-05 PROCEDURE — G0382 LEV 3 HOSP TYPE B ED VISIT: HCPCS | Performed by: PHYSICIAN ASSISTANT

## 2025-07-05 RX ORDER — ALBUTEROL SULFATE 90 UG/1
2 INHALANT RESPIRATORY (INHALATION) EVERY 6 HOURS PRN
Qty: 8.5 G | Refills: 0 | Status: SHIPPED | OUTPATIENT
Start: 2025-07-05

## 2025-07-05 RX ORDER — METHYLPREDNISOLONE 4 MG/1
TABLET ORAL
Qty: 1 EACH | Refills: 0 | Status: SHIPPED | OUTPATIENT
Start: 2025-07-05

## 2025-07-05 NOTE — PROGRESS NOTES
[facility]  Name: Mita Calzada      : 1995      MRN: [id number]  Encounter Provider: Dania Bautista PA-C  Encounter Date: 2025   Encounter department: [facility] [place]  :  Assessment & Plan  Acute bronchitis, unspecified organism    Orders:    XR chest pa and lateral; Future    amoxicillin-clavulanate (AUGMENTIN) 875-125 mg per tablet; Take 1 tablet by mouth every 12 (twelve) hours for 7 days    albuterol (ProAir HFA) 90 mcg/act inhaler; Inhale 2 puffs every 6 (six) hours as needed for wheezing    methylPREDNISolone 4 MG tablet therapy pack; Use as directed on package    Chest X-ray no acute finding pending radiology report.     Patient Instructions    Patient was educated on acute bronchitis. Educated on antibiotics, steroids and inhaler. Eat on antibiotics. Do not take OTC anti-inflammatories while on steroids. Any chest pain or shortness of breath go to ED.    Follow up with PCP    Follow up with PCP in 3-5 days.  Proceed to  ER if symptoms worsen.    If tests are performed, our office will contact you with results only if changes need to made to the care plan discussed with you at the visit. [facility]    Chief Complaint:   Chief Complaint   Patient presents with    Cough     Reports dry cough for the past 11 days with no improvement with medication. Denies sore throat, congestion, runny nose, headache or body aches, or sob or fever.     History of Present Illness   Patient is a pleasant 29 year old female who presents with cough and no fever for 11 days. Denies any current chest pain or shortness of breath. Denies any history of asthma or diabetes. Denies any allergies to medications.    Denies any chances of pregnancy or breast feeding.      was used for this appointment. [id number]    Cough          Review of Systems   Constitutional: Negative.    HENT: Negative.     Respiratory:  Positive for cough.   "  Cardiovascular: Negative.    Psychiatric/Behavioral: Negative.       Past Medical History   Past Medical History[1]  Past Surgical History[2]  Family History[3]  she reports that she has never smoked. She has never used smokeless tobacco. She reports that she does not drink alcohol and does not use drugs.  Current Outpatient Medications   Medication Instructions    acetaminophen (TYLENOL) 650 mg, Oral, Every 4 hours PRN    albuterol (ProAir HFA) 90 mcg/act inhaler 2 puffs, Inhalation, Every 6 hours PRN    amoxicillin-clavulanate (AUGMENTIN) 875-125 mg per tablet 1 tablet, Oral, Every 12 hours scheduled    benzonatate (TESSALON) 200 mg, Oral, 3 times daily PRN    docusate sodium (COLACE) 100 mg, Oral, 2 times daily    ibuprofen (MOTRIN) 600 mg, Oral, Every 6 hours    medroxyPROGESTERone (DEPO-PROVERA) 150 mg, Intramuscular, Every 3 months    methylPREDNISolone 4 MG tablet therapy pack Use as directed on package    norethindrone (ORTHO MICRONOR) 0.35 mg, Oral, Daily    Prenatal Vit-Fe Fumarate-FA (PRENATAL PO) Take by mouth   Allergies[4]     Objective   /70   Pulse 78   Temp 97.6 °F (36.4 °C) (Tympanic)   Resp 18   Ht 5' 4\" (1.626 m)   Wt 61.2 kg (135 lb)   SpO2 99%   BMI 23.17 kg/m²      Physical Exam  Vitals and nursing note reviewed.   Constitutional:       Appearance: Normal appearance.   HENT:      Head: Normocephalic.      Right Ear: Tympanic membrane, ear canal and external ear normal.      Left Ear: Tympanic membrane, ear canal and external ear normal.      Mouth/Throat:      Mouth: Mucous membranes are moist.     Cardiovascular:      Rate and Rhythm: Normal rate and regular rhythm.      Heart sounds: Normal heart sounds.   Pulmonary:      Breath sounds: Normal breath sounds. No wheezing.     Neurological:      General: No focal deficit present.      Mental Status: She is alert and oriented to person, place, and time.     Psychiatric:         Mood and Affect: Mood normal.         Behavior: " "Behavior normal.         Portions of the record may have been created with voice recognition software.  Occasional wrong word or \"sound a like\" substitutions may have occurred due to the inherent limitations of voice recognition software.  Read the chart carefully and recognize, using context, where substitutions have occurred.       [1]   Past Medical History:  Diagnosis Date    History of kidney stones     Irregular menstrual bleeding 8/12/2022    Had Paraguard IUD placed in July on 2021 and has noticed irregular and increased bleeding since that time.     IUD (intrauterine device) in place 7/30/2021    Paragard inserted 7/16/21   [2]   Past Surgical History:  Procedure Laterality Date    KIDNEY STONE SURGERY  2018    in [place]    [3]   Family History  Problem Relation Name Age of Onset    Tuberculosis Mother      Heart attack Father  56    No Known Problems Sister      No Known Problems Brother      No Known Problems Brother      No Known Problems Brother      No Known Problems Maternal Grandmother      No Known Problems Maternal Grandfather      No Known Problems Paternal Grandmother      No Known Problems Son      No Known Problems Son      Cancer Neg Hx     [4] No Known Allergies    "

## 2025-07-05 NOTE — PATIENT INSTRUCTIONS
Patient was educated on acute bronchitis. Educated on antibiotics, steroids and inhaler. Eat on antibiotics. Do not take OTC anti-inflammatories while on steroids. Any chest pain or shortness of breath go to ED.    Follow up with PCP

## 2025-08-15 ENCOUNTER — ANNUAL EXAM (OUTPATIENT)
Dept: OBGYN CLINIC | Facility: CLINIC | Age: 30
End: 2025-08-15

## 2025-08-18 ENCOUNTER — RESULTS FOLLOW-UP (OUTPATIENT)
Dept: OBGYN CLINIC | Facility: CLINIC | Age: 30
End: 2025-08-18